# Patient Record
Sex: MALE | Race: WHITE | URBAN - METROPOLITAN AREA
[De-identification: names, ages, dates, MRNs, and addresses within clinical notes are randomized per-mention and may not be internally consistent; named-entity substitution may affect disease eponyms.]

---

## 2017-01-05 DIAGNOSIS — E78.5 HYPERLIPIDEMIA LDL GOAL <130: ICD-10-CM

## 2017-01-05 DIAGNOSIS — I10 HYPERTENSION WITH GOAL BLOOD PRESSURE LESS THAN 140/90: Primary | ICD-10-CM

## 2017-01-05 NOTE — TELEPHONE ENCOUNTER
Reason for Call:  Medication or medication refill:    Do you use a [facility]?  Name of the pharmacy and phone number for the current request:  [facility]    Name of the medication requested: simvastatin (ZOCOR) 40 MG, lisinopril (PRINIVIL,ZESTRIL) 10 MG     Other request: patient needs refill to be sent to mail order, pended.  Please call patient when done.    Can we leave a detailed message on this number? YES    Phone number patient can be reached at: Cell number on file:    Telephone Information:   Mobile [phone]       Best Time: any    Call taken on 1/5/2017 at 11:13 AM by Nina Gunn    simvastatin (ZOCOR) 40 MG     Last Written Prescription Date: 3-25-16  Last Fill Quantity: 90, # refills: 2  Last Office Visit with [facility], [facility] or [facility] prescribing provider: 3-24-16       CHOL      181   3/24/2016  HDL       46   3/24/2016  LDL      106   3/24/2016  TRIG      145   3/24/2016  CHOLHDLRATIO      3.6   3/19/2015    lisinopril (PRINIVIL,ZESTRIL) 10 MG       Last Written Prescription Date: 3-25-16  Last Fill Quantity: 90, # refills: 2  Last Office Visit with [facility], [facility] or [facility] prescribing provider: 3-24-16       POTASSIUM   Date Value Ref Range Status   03/24/2016 4.3 3.4 - 5.3 mmol/L Final     CREATININE   Date Value Ref Range Status   03/24/2016 0.94 0.66 - 1.25 mg/dL Final     BP Readings from Last 3 Encounters:   03/24/16 132/81   03/19/15 126/88   06/06/14 125/75

## 2017-01-06 PROBLEM — I10 HYPERTENSION WITH GOAL BLOOD PRESSURE LESS THAN 140/90: Status: ACTIVE | Noted: 2017-01-06

## 2017-01-06 RX ORDER — LISINOPRIL 10 MG/1
10 TABLET ORAL DAILY
Qty: 90 TABLET | Refills: 0 | Status: SHIPPED | OUTPATIENT
Start: 2017-01-06 | End: 2017-03-27

## 2017-01-06 RX ORDER — SIMVASTATIN 40 MG
40 TABLET ORAL AT BEDTIME
Qty: 90 TABLET | Refills: 0 | Status: SHIPPED | OUTPATIENT
Start: 2017-01-06 | End: 2017-03-27

## 2017-01-06 NOTE — TELEPHONE ENCOUNTER
Last seen 3/24/16, advised one year re-check.    BP Readings from Last 3 Encounters:   03/24/16 132/81   03/19/15 126/88   06/06/14 125/75     CREATININE   Date Value Ref Range Status   03/24/2016 0.94 0.66 - 1.25 mg/dL Final   ]  POTASSIUM   Date Value Ref Range Status   03/24/2016 4.3 3.4 - 5.3 mmol/L Final   ]  Recent Labs   Lab Test  03/24/16   0745  03/19/15   0736  04/03/14   0740   CHOL  181  176  163   HDL  46  49  42   LDL  106*  104  100   TRIG  145  116  111   CHOLHDLRATIO   --   3.6  3.9     Stated LDL is <130.    Prescription lisinopril can be approved by RN for 90 days per [facility] Refill Protocol.  Unsure if can be sent under alternate provider or needs to be routed (PCP is retired).  Clinic PCS checking on this.  Will hold refill for response.  Routed to covering provider for new signature.    Simvastatin: Will need to route refill request to alternate provider (see above) for review/approval because:  Drug interaction warning    Tova Edgar, ASHER  [facility]

## 2017-03-27 ENCOUNTER — OFFICE VISIT (OUTPATIENT)
Dept: FAMILY MEDICINE | Facility: CLINIC | Age: 62
End: 2017-03-27
Payer: COMMERCIAL

## 2017-03-27 VITALS
TEMPERATURE: 97.7 F | WEIGHT: 221 LBS | DIASTOLIC BLOOD PRESSURE: 82 MMHG | SYSTOLIC BLOOD PRESSURE: 138 MMHG | HEART RATE: 82 BPM | HEIGHT: 70 IN | BODY MASS INDEX: 31.64 KG/M2

## 2017-03-27 DIAGNOSIS — Z00.00 ROUTINE GENERAL MEDICAL EXAMINATION AT A HEALTH CARE FACILITY: Primary | ICD-10-CM

## 2017-03-27 DIAGNOSIS — I10 HYPERTENSION WITH GOAL BLOOD PRESSURE LESS THAN 140/90: ICD-10-CM

## 2017-03-27 DIAGNOSIS — B35.4 TINEA CORPORIS: ICD-10-CM

## 2017-03-27 DIAGNOSIS — Z80.42 FAMILY HISTORY OF PROSTATE CANCER IN FATHER: ICD-10-CM

## 2017-03-27 DIAGNOSIS — E78.5 HYPERLIPIDEMIA LDL GOAL <130: ICD-10-CM

## 2017-03-27 LAB
ALT SERPL W P-5'-P-CCNC: 38 U/L (ref 0–70)
ANION GAP SERPL CALCULATED.3IONS-SCNC: 8 MMOL/L (ref 3–14)
AST SERPL W P-5'-P-CCNC: 19 U/L (ref 0–45)
BUN SERPL-MCNC: 16 MG/DL (ref 7–30)
CALCIUM SERPL-MCNC: 9.1 MG/DL (ref 8.5–10.1)
CHLORIDE SERPL-SCNC: 105 MMOL/L (ref 94–109)
CHOLEST SERPL-MCNC: 223 MG/DL
CK SERPL-CCNC: 181 U/L (ref 30–300)
CO2 SERPL-SCNC: 31 MMOL/L (ref 20–32)
CREAT SERPL-MCNC: 1.02 MG/DL (ref 0.66–1.25)
GFR SERPL CREATININE-BSD FRML MDRD: 75 ML/MIN/1.7M2
GLUCOSE SERPL-MCNC: 105 MG/DL (ref 70–99)
HDLC SERPL-MCNC: 51 MG/DL
LDLC SERPL CALC-MCNC: 139 MG/DL
NONHDLC SERPL-MCNC: 172 MG/DL
POTASSIUM SERPL-SCNC: 4.4 MMOL/L (ref 3.4–5.3)
PSA SERPL-ACNC: 0.88 UG/L (ref 0–4)
SODIUM SERPL-SCNC: 144 MMOL/L (ref 133–144)
TRIGL SERPL-MCNC: 164 MG/DL

## 2017-03-27 PROCEDURE — 82550 ASSAY OF CK (CPK): CPT | Performed by: FAMILY MEDICINE

## 2017-03-27 PROCEDURE — G0103 PSA SCREENING: HCPCS | Performed by: FAMILY MEDICINE

## 2017-03-27 PROCEDURE — 80061 LIPID PANEL: CPT | Performed by: FAMILY MEDICINE

## 2017-03-27 PROCEDURE — 99396 PREV VISIT EST AGE 40-64: CPT | Performed by: FAMILY MEDICINE

## 2017-03-27 PROCEDURE — 84460 ALANINE AMINO (ALT) (SGPT): CPT | Performed by: FAMILY MEDICINE

## 2017-03-27 PROCEDURE — 84450 TRANSFERASE (AST) (SGOT): CPT | Performed by: FAMILY MEDICINE

## 2017-03-27 PROCEDURE — 36415 COLL VENOUS BLD VENIPUNCTURE: CPT | Performed by: FAMILY MEDICINE

## 2017-03-27 PROCEDURE — 80048 BASIC METABOLIC PNL TOTAL CA: CPT | Performed by: FAMILY MEDICINE

## 2017-03-27 RX ORDER — LISINOPRIL 10 MG/1
10 TABLET ORAL DAILY
Qty: 90 TABLET | Refills: 3 | Status: SHIPPED | OUTPATIENT
Start: 2017-03-27 | End: 2018-04-02

## 2017-03-27 RX ORDER — FLUCONAZOLE 200 MG/1
200 TABLET ORAL DAILY
Qty: 4 TABLET | Refills: 6 | Status: SHIPPED | OUTPATIENT
Start: 2017-03-27 | End: 2019-04-04

## 2017-03-27 RX ORDER — SIMVASTATIN 40 MG
40 TABLET ORAL AT BEDTIME
Qty: 90 TABLET | Refills: 3 | Status: SHIPPED | OUTPATIENT
Start: 2017-03-27 | End: 2018-04-02

## 2017-03-27 NOTE — PROGRESS NOTES
SUBJECTIVE:     CC: Mitchell Randolph is an 61 year old male who presents for preventative health visit.     Physical   Annual:     Getting at least 3 servings of Calcium per day::  Yes    Bi-annual eye exam::  Yes    Dental care twice a year::  Yes    Sleep apnea or symptoms of sleep apnea::  Excessive snoring    Diet::  Regular (no restrictions) and Low fat/cholesterol    Frequency of exercise::  1 day/week    Duration of exercise::  15-30 minutes    Taking medications regularly::  Yes    Medication side effects::  Not applicable    Additional concerns today::  No            Today's PHQ-2 Score:   PHQ-2 ( 1999 Pfizer) 3/26/2017   Little interest or pleasure in doing things Not at all   Feeling down, depressed or hopeless Not at all   PHQ-2 Score 0       Abuse: Current or Past(Physical, Sexual or Emotional)- No  Do you feel safe in your environment - Yes    Social History   Substance Use Topics     Smoking status: Never Smoker     Smokeless tobacco: Never Used     Alcohol use Yes      Comment: 2 beers per week     The patient does not drink >3 drinks per day nor >7 drinks per week.    Last PSA:   PSA   Date Value Ref Range Status   03/24/2016 0.87 0 - 4 ug/L Final       Recent Labs   Lab Test  03/24/16   0745  03/19/15   0736  04/03/14   0740   CHOL  181  176  163   HDL  46  49  42   LDL  106*  104  100   TRIG  145  116  111   CHOLHDLRATIO   --   3.6  3.9   NHDL  135*   --    --        Reviewed orders with patient. Reviewed health maintenance and updated orders accordingly - Yes    Reviewed and updated as needed this visit by clinical staff    Father recently diagnosed with aggressive prostate cancer          Reviewed and updated as needed this visit by Provider        Past Medical History:   Diagnosis Date     High cholesterol       History reviewed. No pertinent surgical history.    ROS:  C: NEGATIVE for fever, chills, change in weight  I: NEGATIVE for worrisome rashes, moles or lesions  E: NEGATIVE for vision changes  "or irritation; tinnitus   ENT: NEGATIVE for ear, mouth and throat problems  R: NEGATIVE for significant cough or SOB  CV: NEGATIVE for chest pain, palpitations or peripheral edema  GI: NEGATIVE for nausea, abdominal pain, heartburn, or change in bowel habits   male: negative for dysuria, hematuria, decreased urinary stream, erectile dysfunction, urethral discharge  M: NEGATIVE for significant arthralgias or myalgia  N: NEGATIVE for weakness, dizziness or paresthesias  P: NEGATIVE for changes in mood or affect    OBJECTIVE:     /82  Pulse 82  Temp 97.7  F (36.5  C) (Oral)  Ht 5' 9.69\" (1.77 m)  Wt 221 lb (100.2 kg)  BMI 32 kg/m2  EXAM:  GENERAL: healthy, alert and no distress; overweight   EYES: Eyes grossly normal to inspection, PERRL and conjunctivae and sclerae normal  HENT: ear canals and TM's normal, nose and mouth without ulcers or lesions  NECK: no adenopathy, no asymmetry, masses, or scars and thyroid normal to palpation  RESP: lungs clear to auscultation - no rales, rhonchi or wheezes  CV: regular rate and rhythm, normal S1 S2, no S3 or S4, no murmur, click or rub, no peripheral edema and peripheral pulses strong  ABDOMEN: soft, nontender, no hepatosplenomegaly, no masses and bowel sounds normal  MS: no gross musculoskeletal defects noted, no edema  SKIN: no suspicious lesions or rashes  NEURO: Normal strength and tone, mentation intact and speech normal  PSYCH: mentation appears normal, affect normal/bright    ASSESSMENT/PLAN:         ICD-10-CM    1. Routine general medical examination at a health care facility Z00.00    2. Hypertension with goal blood pressure less than 140/90 I10 lisinopril (PRINIVIL/ZESTRIL) 10 MG tablet     Basic metabolic panel   3. Hyperlipidemia LDL goal <130 E78.5 simvastatin (ZOCOR) 40 MG tablet     Lipid panel reflex to direct LDL     AST     ALT     CK total   4. Tinea corporis B35.4 fluconazole (DIFLUCAN) 200 MG tablet   5. Family history of prostate cancer in " "father Z80.42 PSA, screen       COUNSELING:   Reviewed preventive health counseling, as reflected in patient instructions       Regular exercise       Healthy diet/nutrition         reports that he has never smoked. He has never used smokeless tobacco.    Estimated body mass index is 31.73 kg/(m^2) as calculated from the following:    Height as of 3/24/16: 5' 9.5\" (1.765 m).    Weight as of 3/24/16: 218 lb (98.9 kg).   Weight management plan: Discussed healthy diet and exercise guidelines and patient will follow up in 12 months in clinic to re-evaluate.    Counseling Resources:  ATP IV Guidelines  Pooled Cohorts Equation Calculator  FRAX Risk Assessment  ICSI Preventive Guidelines  Dietary Guidelines for Americans, 2010  USDA's MyPlate  ASA Prophylaxis  Lung CA Screening    Todd Oh MD  [facility]  Answers for HPI/ROS submitted by the patient on 3/26/2017   Q1: Little interest or pleasure in doing things: 0=Not at all  Q2: Feeling down, depressed or hopeless: 0=Not at all  PHQ-2 Score: 0    "

## 2017-03-27 NOTE — NURSING NOTE
"Chief Complaint   Patient presents with     Physical       Initial BP (!) 144/93 (BP Location: Left arm, Cuff Size: Adult Regular)  Pulse 82  Temp 97.7  F (36.5  C) (Oral)  Ht 5' 9.69\" (1.77 m)  Wt 221 lb (100.2 kg)  BMI 32 kg/m2 Estimated body mass index is 32 kg/(m^2) as calculated from the following:    Height as of this encounter: 5' 9.69\" (1.77 m).    Weight as of this encounter: 221 lb (100.2 kg).  Medication Reconciliation: complete   Lidya Reese MA      "

## 2017-03-27 NOTE — MR AVS SNAPSHOT
After Visit Summary   3/27/2017    Mitchell Randolph    MRN: [id number]           Patient Information     Date Of Birth          1955        Visit Information        Provider Department      3/27/2017 7:40 AM Todd Oh MD [facility]        Today's Diagnoses     Family history of prostate cancer in father    -  1    Hypertension with goal blood pressure less than 140/90        Hyperlipidemia LDL goal <130        Tinea corporis          Care Instructions      Preventive Health Recommendations  Male Ages 50 - 64    Yearly exam:             See your health care provider every year in order to  o   Review health changes.   o   Discuss preventive care.    o   Review your medicines if your doctor has prescribed any.     Have a cholesterol test every 5 years, or more frequently if you are at risk for high cholesterol/heart disease.     Have a diabetes test (fasting glucose) every three years. If you are at risk for diabetes, you should have this test more often.     Have a colonoscopy at age 50, or have a yearly FIT test (stool test). These exams will check for colon cancer.      Talk with your health care provider about whether or not a prostate cancer screening test (PSA) is right for you.    You should be tested each year for STDs (sexually transmitted diseases), if you re at risk.     Shots: Get a flu shot each year. Get a tetanus shot every 10 years.     Nutrition:    Eat at least 5 servings of fruits and vegetables daily.     Eat whole-grain bread, whole-wheat pasta and brown rice instead of white grains and rice.     Talk to your provider about Calcium and Vitamin D.     Lifestyle    Exercise for at least 150 minutes a week (30 minutes a day, 5 days a week). This will help you control your weight and prevent disease.     Limit alcohol to one drink per day.     No smoking.     Wear sunscreen to prevent skin cancer.     See your dentist every six months for an exam and  "cleaning.     See your eye doctor every 1 to 2 years.          Follow-ups after your visit        Follow-up notes from your care team     Return in about 1 year (around 3/27/2018) for Physical Exam.      Who to contact     If you have questions or need follow up information about today's clinic visit or your schedule please contact [facility] directly at [phone].  Normal or non-critical lab and imaging results will be communicated to you by [employer], letter or phone within 4 business days after the clinic has received the results. If you do not hear from us within 7 days, please contact the clinic through [employer] or phone. If you have a critical or abnormal lab result, we will notify you by phone as soon as possible.  Submit refill requests through [employer] or call your pharmacy and they will forward the refill request to us. Please allow 3 business days for your refill to be completed.          Additional Information About Your Visit        [employer] Information     [employer] gives you secure access to your electronic health record. If you see a primary care provider, you can also send messages to your care team and make appointments. If you have questions, please call your primary care clinic.  If you do not have a primary care provider, please call [phone] and they will assist you.        Care EveryWhere ID     This is your Care EveryWhere ID. This could be used by other organizations to access your [place] medical records  [id number]        Your Vitals Were     Pulse Temperature Height BMI (Body Mass Index)          82 97.7  F (36.5  C) (Oral) 5' 9.69\" (1.77 m) 32 kg/m2         Blood Pressure from Last 3 Encounters:   03/27/17 138/82   03/24/16 132/81   03/19/15 126/88    Weight from Last 3 Encounters:   03/27/17 221 lb (100.2 kg)   03/24/16 218 lb (98.9 kg)   03/19/15 214 lb (97.1 kg)              We Performed the Following     ALT     AST     Basic metabolic panel     CK total     " Lipid panel reflex to direct LDL     PSA, screen          Where to get your medicines      These medications were sent to [facility] - [place], [place] - [address]  [address] [address]     Phone:  [phone]     lisinopril 10 MG tablet    simvastatin 40 MG tablet         These medications were sent to [id number] - [place], [place] - [address] [facility] [address]  [address], [facility] [address]     Phone:  [phone]     fluconazole 200 MG tablet          Primary Care Provider Office Phone # Fax #    Max Welch [phone]       [facility] [address] [facility] [address]        Thank you!     Thank you for choosing [facility]  for your care. Our goal is always to provide you with excellent care. Hearing back from our patients is one way we can continue to improve our services. Please take a few minutes to complete the written survey that you may receive in the mail after your visit with us. Thank you!             Your Updated Medication List - Protect others around you: Learn how to safely use, store and throw away your medicines at www.disposemymeds.org.          This list is accurate as of: 3/27/17  8:29 AM.  Always use your most recent med list.                   Brand Name Dispense Instructions for use    fluconazole 200 MG tablet    DIFLUCAN    4 tablet    Take 1 tablet (200 mg) by mouth daily 2 tablets as needed, repeat 2 tablets in 2 weeks       lisinopril 10 MG tablet    PRINIVIL/ZESTRIL    90 tablet    Take 1 tablet (10 mg) by mouth daily Due for recheck in March 2017       simvastatin 40 MG tablet    ZOCOR    90 tablet    Take 1 tablet (40 mg) by mouth At Bedtime Due for recheck in March 2017

## 2017-03-27 NOTE — PROGRESS NOTES
"  SUBJECTIVE:     CC: Mitchell Randolph is an 61 year old male who presents for preventative health visit.     Healthy Habits:    Do you get at least three servings of calcium containing foods daily (dairy, green leafy vegetables, etc.)? {YES/NO, DAIRY INTAKE:434614::\"yes\"}    Amount of exercise or daily activities, outside of work: {AMOUNT EXERCISE:264310}    Problems taking medications regularly {Yes /No default:130865::\"No\"}    Medication side effects: {Yes /No default.:965523::\"No\"}    Have you had an eye exam in the past two years? {YESNOBLANK:439526}    Do you see a dentist twice per year? {YESNOBLANK:835407}    Do you have sleep apnea, excessive snoring or daytime drowsiness?{YESNOBLANK:241935}    {Outside tests to abstract? :486580}    {additional problems to add:765821}    Today's PHQ-2 Score:   PHQ-2 ( 1999 Pfizer) 3/26/2017 3/24/2016   Q1: Little interest or pleasure in doing things - 0   Q2: Feeling down, depressed or hopeless - 0   PHQ-2 Score - 0   Little interest or pleasure in doing things Not at all -   Feeling down, depressed or hopeless Not at all -   PHQ-2 Score 0 -     {PHQ-2 LOOK IN ASSESSMENTS :837678}  Abuse: Current or Past(Physical, Sexual or Emotional)- {YES/NO/NA:445133}  Do you feel safe in your environment - {YES/NO/NA:355704}    Social History   Substance Use Topics     Smoking status: Never Smoker     Smokeless tobacco: Never Used     Alcohol use Yes      Comment: 2 beers per week     {ETOH AUDIT:448898}    Last PSA:   PSA   Date Value Ref Range Status   03/24/2016 0.87 0 - 4 ug/L Final       Recent Labs   Lab Test  03/24/16   0745  03/19/15   0736  04/03/14   0740   CHOL  181  176  163   HDL  46  49  42   LDL  106*  104  100   TRIG  145  116  111   CHOLHDLRATIO   --   3.6  3.9   NHDL  135*   --    --        Reviewed orders with patient. Reviewed health maintenance and updated orders accordingly - {Yes/No:883754::\"Yes\"}    Reviewed and updated as needed this visit by clinical staff     " "    Reviewed and updated as needed this visit by Provider        {HISTORY OPTIONS:048686}    ROS:  {MALE ROS-adult preventive care package:165303::\"C: NEGATIVE for fever, chills, change in weight\",\"I: NEGATIVE for worrisome rashes, moles or lesions\",\"E: NEGATIVE for vision changes or irritation\",\"ENT: NEGATIVE for ear, mouth and throat problems\",\"R: NEGATIVE for significant cough or SOB\",\"CV: NEGATIVE for chest pain, palpitations or peripheral edema\",\"GI: NEGATIVE for nausea, abdominal pain, heartburn, or change in bowel habits\",\" male: negative for dysuria, hematuria, decreased urinary stream, erectile dysfunction, urethral discharge\",\"M: NEGATIVE for significant arthralgias or myalgia\",\"N: NEGATIVE for weakness, dizziness or paresthesias\",\"P: NEGATIVE for changes in mood or affect\"}    {CHRONICPROBDATA:235197}  OBJECTIVE:     There were no vitals taken for this visit.  EXAM:  {Exam Choices:218102}    ASSESSMENT/PLAN:     {Diag Picklist:417267}    COUNSELING:  {MALE COUNSELING MESSAGES:121380::\"Reviewed preventive health counseling, as reflected in patient instructions\"}    {Blood Pressure - Adult Preventive:801526}     reports that he has never smoked. He has never used smokeless tobacco.  {Tobacco Cessation needed for ACO -- Delete if patient is a non-smoker:690828}  Estimated body mass index is 31.73 kg/(m^2) as calculated from the following:    Height as of 3/24/16: 5' 9.5\" (1.765 m).    Weight as of 3/24/16: 218 lb (98.9 kg).   {Weight Management Plan needed for ACO:229657}    Counseling Resources:  ATP IV Guidelines  Pooled Cohorts Equation Calculator  FRAX Risk Assessment  ICSI Preventive Guidelines  Dietary Guidelines for Americans, 2010  USDA's MyPlate  ASA Prophylaxis  Lung CA Screening    Todd Oh MD  [facility]  "

## 2017-03-31 NOTE — PROGRESS NOTES
Your psa is normal   Your basic metabolic panel which includes electrolytes,kidney function is normal and  -Glucose (diabetic screening test) is mildly elevated.   Your cholesterols are moderately elevated and have gone up from a year ago.   Your liver tests and muscle enzyme tests are normal     I would guess from your numbers you had stopped taking your choleserols meds   If so, restart them.  If not, please call office to discuss.           Follow up in 6 month(s) for repeat lipids

## 2017-07-13 ENCOUNTER — MYC MEDICAL ADVICE (OUTPATIENT)
Dept: FAMILY MEDICINE | Facility: CLINIC | Age: 62
End: 2017-07-13

## 2018-04-02 ENCOUNTER — OFFICE VISIT (OUTPATIENT)
Dept: FAMILY MEDICINE | Facility: CLINIC | Age: 63
End: 2018-04-02
Payer: COMMERCIAL

## 2018-04-02 VITALS
OXYGEN SATURATION: 98 % | SYSTOLIC BLOOD PRESSURE: 138 MMHG | HEIGHT: 70 IN | DIASTOLIC BLOOD PRESSURE: 80 MMHG | BODY MASS INDEX: 32.35 KG/M2 | TEMPERATURE: 98.4 F | WEIGHT: 226 LBS | HEART RATE: 64 BPM

## 2018-04-02 DIAGNOSIS — I10 HYPERTENSION WITH GOAL BLOOD PRESSURE LESS THAN 140/90: ICD-10-CM

## 2018-04-02 DIAGNOSIS — E66.09 CLASS 1 OBESITY DUE TO EXCESS CALORIES WITHOUT SERIOUS COMORBIDITY IN ADULT, UNSPECIFIED BMI: ICD-10-CM

## 2018-04-02 DIAGNOSIS — E66.811 CLASS 1 OBESITY DUE TO EXCESS CALORIES WITHOUT SERIOUS COMORBIDITY IN ADULT, UNSPECIFIED BMI: ICD-10-CM

## 2018-04-02 DIAGNOSIS — Z00.01 ENCOUNTER FOR GENERAL ADULT MEDICAL EXAMINATION WITH ABNORMAL FINDINGS: Primary | ICD-10-CM

## 2018-04-02 DIAGNOSIS — Z80.42 FAMILY HISTORY OF PROSTATE CANCER: ICD-10-CM

## 2018-04-02 DIAGNOSIS — E78.5 HYPERLIPIDEMIA LDL GOAL <130: ICD-10-CM

## 2018-04-02 DIAGNOSIS — Z12.11 SPECIAL SCREENING FOR MALIGNANT NEOPLASMS, COLON: ICD-10-CM

## 2018-04-02 LAB
ALT SERPL W P-5'-P-CCNC: 37 U/L (ref 0–70)
ANION GAP SERPL CALCULATED.3IONS-SCNC: 4 MMOL/L (ref 3–14)
AST SERPL W P-5'-P-CCNC: 20 U/L (ref 0–45)
BUN SERPL-MCNC: 20 MG/DL (ref 7–30)
CALCIUM SERPL-MCNC: 8.8 MG/DL (ref 8.5–10.1)
CHLORIDE SERPL-SCNC: 106 MMOL/L (ref 94–109)
CHOLEST SERPL-MCNC: 179 MG/DL
CK SERPL-CCNC: 231 U/L (ref 30–300)
CO2 SERPL-SCNC: 31 MMOL/L (ref 20–32)
CREAT SERPL-MCNC: 1.01 MG/DL (ref 0.66–1.25)
GFR SERPL CREATININE-BSD FRML MDRD: 75 ML/MIN/1.7M2
GLUCOSE SERPL-MCNC: 104 MG/DL (ref 70–99)
HDLC SERPL-MCNC: 49 MG/DL
LDLC SERPL CALC-MCNC: 104 MG/DL
NONHDLC SERPL-MCNC: 130 MG/DL
POTASSIUM SERPL-SCNC: 4.3 MMOL/L (ref 3.4–5.3)
PSA SERPL-ACNC: 1 UG/L (ref 0–4)
SODIUM SERPL-SCNC: 141 MMOL/L (ref 133–144)
TRIGL SERPL-MCNC: 128 MG/DL

## 2018-04-02 PROCEDURE — 84153 ASSAY OF PSA TOTAL: CPT | Performed by: FAMILY MEDICINE

## 2018-04-02 PROCEDURE — 84450 TRANSFERASE (AST) (SGOT): CPT | Performed by: FAMILY MEDICINE

## 2018-04-02 PROCEDURE — 99396 PREV VISIT EST AGE 40-64: CPT | Performed by: FAMILY MEDICINE

## 2018-04-02 PROCEDURE — 36415 COLL VENOUS BLD VENIPUNCTURE: CPT | Performed by: FAMILY MEDICINE

## 2018-04-02 PROCEDURE — 80048 BASIC METABOLIC PNL TOTAL CA: CPT | Performed by: FAMILY MEDICINE

## 2018-04-02 PROCEDURE — 80061 LIPID PANEL: CPT | Performed by: FAMILY MEDICINE

## 2018-04-02 PROCEDURE — 82550 ASSAY OF CK (CPK): CPT | Performed by: FAMILY MEDICINE

## 2018-04-02 PROCEDURE — 84460 ALANINE AMINO (ALT) (SGPT): CPT | Performed by: FAMILY MEDICINE

## 2018-04-02 RX ORDER — LISINOPRIL 20 MG/1
20 TABLET ORAL DAILY
Qty: 90 TABLET | Refills: 3 | Status: SHIPPED | OUTPATIENT
Start: 2018-04-02 | End: 2019-01-15

## 2018-04-02 RX ORDER — SIMVASTATIN 40 MG
40 TABLET ORAL AT BEDTIME
Qty: 90 TABLET | Refills: 3 | Status: SHIPPED | OUTPATIENT
Start: 2018-04-02 | End: 2019-01-15

## 2018-04-02 NOTE — PROGRESS NOTES
SUBJECTIVE:   CC: Mitchell Randolph is an 62 year old male who presents for preventative health visit.     Physical   Annual:     Getting at least 3 servings of Calcium per day::  Yes    Bi-annual eye exam::  Yes    Dental care twice a year::  Yes    Sleep apnea or symptoms of sleep apnea::  Daytime drowsiness    Diet::  Low fat/cholesterol    Frequency of exercise::  1 day/week    Duration of exercise::  15-30 minutes    Taking medications regularly::  Yes    Medication side effects::  None    Additional concerns today::  No              Today's PHQ-2 Score:   PHQ-2 ( 1999 Pfizer) 4/2/2018   Q1: Little interest or pleasure in doing things 0   Q2: Feeling down, depressed or hopeless 0   PHQ-2 Score 0   Q1: Little interest or pleasure in doing things Not at all   Q2: Feeling down, depressed or hopeless Not at all   PHQ-2 Score 0       Abuse: Current or Past(Physical, Sexual or Emotional)- No  Do you feel safe in your environment - Yes    Social History   Substance Use Topics     Smoking status: Never Smoker     Smokeless tobacco: Never Used     Alcohol use Yes      Comment: 2 beers per week     Alcohol Use 4/2/2018   If you drink alcohol do you typically have greater than 3 drinks per day OR greater than 7 drinks per week? No       Last PSA:   PSA   Date Value Ref Range Status   03/27/2017 0.88 0 - 4 ug/L Final     Comment:     Assay Method:  Chemiluminescence using Siemens Vista analyzer       Reviewed orders with patient. Reviewed health maintenance and updated orders accordingly - Yes      Reviewed and updated as needed this visit by clinical staff  Tobacco  Allergies  Meds  Med Hx  Surg Hx  Fam Hx  Soc Hx        Reviewed and updated as needed this visit by Provider        Past Medical History:   Diagnosis Date     High cholesterol       History reviewed. No pertinent surgical history.    Review of Systems  C: NEGATIVE for fever, chills, change in weight  I: NEGATIVE for worrisome rashes, moles or lesions  E:  "NEGATIVE for vision changes or irritation  ENT: NEGATIVE for ear, mouth and throat problems  R: NEGATIVE for significant cough or SOB  CV: NEGATIVE for chest pain, palpitations or peripheral edema; snores   No apnea   GI: NEGATIVE for nausea, abdominal pain, heartburn, or change in bowel habits   male: negative for dysuria, hematuria, decreased urinary stream, erectile dysfunction, urethral discharge  M: NEGATIVE for significant arthralgias or myalgia  N: NEGATIVE for weakness, dizziness or paresthesias  P: NEGATIVE for changes in mood or affect    OBJECTIVE:   /83 (BP Location: Left arm, Patient Position: Chair, Cuff Size: Adult Large)  Pulse 64  Temp 98.4  F (36.9  C) (Oral)  Ht 5' 9.69\" (1.77 m)  Wt 226 lb (102.5 kg)  SpO2 98%  BMI 32.72 kg/m2     Wt Readings from Last 4 Encounters:   04/02/18 226 lb (102.5 kg)   03/27/17 221 lb (100.2 kg)   03/24/16 218 lb (98.9 kg)   03/19/15 214 lb (97.1 kg)          Physical Exam  GENERAL: healthy, alert and no distress  EYES: Eyes grossly normal to inspection, PERRL and conjunctivae and sclerae normal  HENT: ear canals and TM's normal, nose and mouth without ulcers or lesions  NECK: no adenopathy, no asymmetry, masses, or scars and thyroid normal to palpation  RESP: lungs clear to auscultation - no rales, rhonchi or wheezes  CV: regular rate and rhythm, normal S1 S2, no S3 or S4, no murmur, click or rub, no peripheral edema and peripheral pulses strong  ABDOMEN: soft, nontender, no hepatosplenomegaly, no masses and bowel sounds normal  MS: no gross musculoskeletal defects noted, no edema  SKIN: no suspicious lesions or rashes  NEURO: Normal strength and tone, mentation intact and speech normal  PSYCH: mentation appears normal, affect normal/bright    ASSESSMENT/PLAN:   No diagnosis found.    COUNSELING:   Reviewed preventive health counseling, as reflected in patient instructions       Regular exercise       Healthy diet/nutrition         reports that he has " "never smoked. He has never used smokeless tobacco.    Estimated body mass index is 32.72 kg/(m^2) as calculated from the following:    Height as of this encounter: 5' 9.69\" (1.77 m).    Weight as of this encounter: 226 lb (102.5 kg).   Weight management plan: Discussed healthy diet and exercise guidelines and patient will follow up in 12 months in clinic to re-evaluate.    Counseling Resources:  ATP IV Guidelines  Pooled Cohorts Equation Calculator  FRAX Risk Assessment  ICSI Preventive Guidelines  Dietary Guidelines for Americans, 2010  USDA's MyPlate  ASA Prophylaxis  Lung CA Screening    Todd Oh MD  [facility]  Answers for HPI/ROS submitted by the patient on 4/2/2018   PHQ-2 Score: 0    "

## 2018-04-02 NOTE — MR AVS SNAPSHOT
After Visit Summary   4/2/2018    Mitchell Randolph    MRN: [id number]           Patient Information     Date Of Birth          1955        Visit Information        Provider Department      4/2/2018 7:40 AM Todd Oh MD [facility]        Today's Diagnoses     Encounter for general adult medical examination with abnormal findings    -  1    Class 1 obesity due to excess calories without serious comorbidity in adult, unspecified BMI        Hypertension with goal blood pressure less than 140/90        Hyperlipidemia LDL goal <130        Family history of prostate cancer        Special screening for malignant neoplasms, colon          Care Instructions      Preventive Health Recommendations  Male Ages 50 - 64    Yearly exam:             See your health care provider every year in order to  o   Review health changes.   o   Discuss preventive care.    o   Review your medicines if your doctor has prescribed any.     Have a cholesterol test every 5 years, or more frequently if you are at risk for high cholesterol/heart disease.     Have a diabetes test (fasting glucose) every three years. If you are at risk for diabetes, you should have this test more often.     Have a colonoscopy at age 50, or have a yearly FIT test (stool test). These exams will check for colon cancer.      Talk with your health care provider about whether or not a prostate cancer screening test (PSA) is right for you.    You should be tested each year for STDs (sexually transmitted diseases), if you re at risk.     Shots: Get a flu shot each year. Get a tetanus shot every 10 years.     Nutrition:    Eat at least 5 servings of fruits and vegetables daily.     Eat whole-grain bread, whole-wheat pasta and brown rice instead of white grains and rice.     Talk to your provider about Calcium and Vitamin D.     Lifestyle    Exercise for at least 150 minutes a week (30 minutes a day, 5 days a week). This will help  you control your weight and prevent disease.     Limit alcohol to one drink per day.     No smoking.     Wear sunscreen to prevent skin cancer.     See your dentist every six months for an exam and cleaning.     See your eye doctor every 1 to 2 years.            Follow-ups after your visit        Additional Services     GASTROENTEROLOGY ADULT REF CONSULT ONLY       Preferred Location: [facility] [phone]      Please be aware that coverage of these services is subject to the terms and limitations of your health insurance plan.  Call member services at your health plan with any benefit or coverage questions.  Any procedures must be performed at a [facility] facility OR coordinated by your clinic's referral office.    Please bring the following with you to your appointment:    (1) Any X-Rays, CTs or MRIs which have been performed.  Contact the facility where they were done to arrange for  prior to your scheduled appointment.    (2) List of current medications   (3) This referral request   (4) Any documents/labs given to you for this referral                  Who to contact     If you have questions or need follow up information about today's clinic visit or your schedule please contact [facility] directly at [phone].  Normal or non-critical lab and imaging results will be communicated to you by MyChart, letter or phone within 4 business days after the clinic has received the results. If you do not hear from us within 7 days, please contact the clinic through [email or web address] or phone. If you have a critical or abnormal lab result, we will notify you by phone as soon as possible.  Submit refill requests through [employer] or call your pharmacy and they will forward the refill request to us. Please allow 3 business days for your refill to be completed.          Additional Information About Your Visit        [email or web address] Information     [employer] gives you secure access to your electronic health record. If  "you see a primary care provider, you can also send messages to your care team and make appointments. If you have questions, please call your primary care clinic.  If you do not have a primary care provider, please call [phone] and they will assist you.        Care EveryWhere ID     This is your Care EveryWhere ID. This could be used by other organizations to access your [facility] medical records  [id number]        Your Vitals Were     Pulse Temperature Height Pulse Oximetry BMI (Body Mass Index)       64 98.4  F (36.9  C) (Oral) 5' 9.69\" (1.77 m) 98% 32.72 kg/m2        Blood Pressure from Last 3 Encounters:   04/02/18 138/80   03/27/17 138/82   03/24/16 132/81    Weight from Last 3 Encounters:   04/02/18 226 lb (102.5 kg)   03/27/17 221 lb (100.2 kg)   03/24/16 218 lb (98.9 kg)              We Performed the Following     ALT     AST     Basic metabolic panel     CK total     GASTROENTEROLOGY ADULT REF CONSULT ONLY     Lipid panel reflex to direct LDL Fasting     PSA, screen          Today's Medication Changes          These changes are accurate as of 4/2/18  8:29 AM.  If you have any questions, ask your nurse or doctor.               These medicines have changed or have updated prescriptions.        Dose/Directions    lisinopril 20 MG tablet   Commonly known as:  PRINIVIL/ZESTRIL   This may have changed:    - medication strength  - how much to take  - additional instructions   Used for:  Hypertension with goal blood pressure less than 140/90   Changed by:  Todd Oh MD        Dose:  20 mg   Take 1 tablet (20 mg) by mouth daily   Quantity:  90 tablet   Refills:  3            Where to get your medicines      These medications were sent to [facility] - [facility], [place] - [facility]  [address] [facility] [address]     Phone:  [phone]     lisinopril 20 MG tablet    simvastatin 40 MG tablet                Primary Care Provider Office Phone # Fax #    Max ASTORGA " MD Rebekah [phone]       [address] [facility] [address]        Equal Access to Services     MICAELA LOVE : Brett Brothers, danie medley, margotabel lopezjujujarred mckeonshonnajarred, mayank shanellin hayaanoelle mckeonlarisa mcghee laSuzanneaj osuna. So [facility] [phone].    ATENCIÓN: Si habla español, tiene a orosco disposición servicios gratuitos de asistencia lingüística. Llame al [phone].    We comply with applicable federal civil rights laws and [place] laws. We do not discriminate on the basis of race, color, national origin, age, disability, sex, sexual orientation, or gender identity.            Thank you!     Thank you for choosing [facility]  for your care. Our goal is always to provide you with excellent care. Hearing back from our patients is one way we can continue to improve our services. Please take a few minutes to complete the written survey that you may receive in the mail after your visit with us. Thank you!             Your Updated Medication List - Protect others around you: Learn how to safely use, store and throw away your medicines at www.disposemymeds.org.          This list is accurate as of 4/2/18  8:29 AM.  Always use your most recent med list.                   Brand Name Dispense Instructions for use Diagnosis    fluconazole 200 MG tablet    DIFLUCAN    4 tablet    Take 1 tablet (200 mg) by mouth daily 2 tablets as needed, repeat 2 tablets in 2 weeks    Tinea corporis       lisinopril 20 MG tablet    PRINIVIL/ZESTRIL    90 tablet    Take 1 tablet (20 mg) by mouth daily    Hypertension with goal blood pressure less than 140/90       simvastatin 40 MG tablet    ZOCOR    90 tablet    Take 1 tablet (40 mg) by mouth At Bedtime Due for recheck in March 2017    Hyperlipidemia LDL goal <130

## 2018-04-06 NOTE — PROGRESS NOTES
Your cholesterols are much improved from last year.  Muscle enzyme and liver function tests are normal  PSA which looks of prostate cancer is normal  Basic metabolic profile for electrolytes kidneys are normal mild elevation of your glucose.    Recheck 1 year.

## 2018-04-08 ENCOUNTER — OFFICE VISIT (OUTPATIENT)
Dept: URGENT CARE | Facility: URGENT CARE | Age: 63
End: 2018-04-08
Payer: COMMERCIAL

## 2018-04-08 VITALS
DIASTOLIC BLOOD PRESSURE: 88 MMHG | SYSTOLIC BLOOD PRESSURE: 140 MMHG | BODY MASS INDEX: 32.52 KG/M2 | OXYGEN SATURATION: 95 % | HEART RATE: 87 BPM | WEIGHT: 224.6 LBS | TEMPERATURE: 98.3 F

## 2018-04-08 DIAGNOSIS — J30.2 ACUTE SEASONAL ALLERGIC RHINITIS DUE TO OTHER ALLERGEN: Primary | ICD-10-CM

## 2018-04-08 PROCEDURE — 99213 OFFICE O/P EST LOW 20 MIN: CPT | Performed by: FAMILY MEDICINE

## 2018-04-08 RX ORDER — CETIRIZINE HYDROCHLORIDE 10 MG/1
10 TABLET ORAL EVERY EVENING
Qty: 30 TABLET | Refills: 1 | Status: SHIPPED | OUTPATIENT
Start: 2018-04-08 | End: 2019-03-11

## 2018-04-08 RX ORDER — FLUTICASONE PROPIONATE 50 MCG
1-2 SPRAY, SUSPENSION (ML) NASAL DAILY
Qty: 16 G | Refills: 0 | Status: SHIPPED | OUTPATIENT
Start: 2018-04-08 | End: 2019-03-11

## 2018-04-08 NOTE — PATIENT INSTRUCTIONS
Causes of Nasal Allergies  Nasal allergies are most commonly caused by one or more of 4 kinds of allergens: pollen (which causes seasonal allergies), house-dust mites, mold, and animals. Other substances, called irritants, can bother the nose and make allergy symptoms worse.    Pollen  Plants reproduce by moving tiny grains of pollen from plant to plant. Some pollen is carried by bees, and some is blown by the wind. It s the wind-blown pollen that causes nasal allergies. The amount of pollen in the air varies from season to season.  House-dust mites  House-dust mites are tiny bugs too small to see. They can live in mattresses, blankets, stuffed toys, carpets, and curtains. The droppings of these mites are a common indoor cause of nasal allergies.  Mold  Mold loves dark, damp areas. It tends to grow in bathrooms, basements, refrigerators, and in the soil of houseplants. Mold reproduces by sending tiny grains called spores into the air. If these spores are breathed in, they can cause a nasal allergic reaction.  Animals  Pets, such as cats, dogs, birds, horses, and rabbits, are common causes of nasal allergies. Flakes of skin (dander), saliva left on fur when an animal cleans itself, urine in litter boxes and cages, and feathers can all cause nasal allergies.  Irritants make allergies worse  Although irritants don t cause nasal allergies, they can make allergy symptoms worse. Cigarette smoke, perfume, aerosol sprays, smoke from wood stoves or fireplaces, car exhaust, and strong odors are examples of irritants.   Date Last Reviewed: 9/1/2016 2000-2017 [employer]. [address] [place] [id number]. All rights reserved. This information is not intended as a substitute for professional medical care. Always follow your healthcare professional's instructions.        Understanding Nasal Allergies  Nasal allergies (also called allergic rhinitis) are a common health problem. They may be seasonal.  This means they cause symptoms only at certain times of the year. Or they may be perennial. This means they cause symptoms all year long. Other health problems, such as asthma, often occur along with allergies as well.    What is an allergic reaction?  An allergy is a reaction to a substance called an allergen. Common allergens include:    Wind-borne pollen    Mold    Dust mites    Furry and feathered animals    Cockroaches  Normally, allergens are harmless. But when a person has allergies, the body thinks they are harmful. The body then attacks allergens with antibodies. Antibodies are attached to special cells called mast cells. Allergens stick to the antibodies. This makes the mast cells release histamine and other chemicals. This is an allergic reaction. The chemicals irritate nearby nasal tissue. This causes nasal allergy symptoms.  Common nasal allergy symptoms  Allergies can cause nasal tissue to swell. This makes the air passages smaller. The nose may feel stuffed up. The nose may also make extra mucus, which can plug the nasal passages or drip out of the nose. Mucus can drip down the back of the throat (postnasal drip) as well. Sinus tissue can swell. This may cause pain and headache. Common allergy symptoms include:    Runny nose with clear, watery discharge    Stuffy nose (nasal congestion)    Drainage down your throat (postnasal drip)    Sneezing    Red, watery eyes    Itchy nose, eyes, ears, and throat    Plugged-up ears (ear congestion)    Sore throat    Coughing    Sinus pain and swelling    Headache  It may not be allergies  Other health problems can cause symptoms like those of nasal allergies. These include:    Nonallergic rhinitis and viruses such as colds    Irritants and pollutants, such as strong odors or smoke    Certain medicines    Changes in the weather   Treatment  Your healthcare provider will evaluate you to find the cause of your symptoms then recommend treatment. If your symptoms are  due to nasal allergies, your healthcare provider may prescribe nasal steroid sprays or oral antihistamines to help reduce symptoms. Avoidance of the allergen will also be suggested. You may also be referred to an allergist.   Date Last Reviewed: 10/1/2016    5923-3099 [employer]. [address] [place] [address]. All rights reserved. This information is not intended as a substitute for professional medical care. Always follow your healthcare professional's instructions.

## 2018-04-08 NOTE — PROGRESS NOTES
SUBJECTIVE:  Chief Complaint   Patient presents with     URI     Patient complains of cold symptoms     Mitchell Randolph is a 62 year old male here with concerns about nasal congestion and pressure, stuffy sensation in the head.  He states onset of symptoms were 5 day(s) ago.  He has  Had ear plugging, has felt warm,  No sore throat, no sinus pain.  Course of illness is same. Severity moderate  Current and Associated symptoms: nasal congestion and headache, some chest tightness  Predisposing factors include seasonal allergies.   Recent treatment has included: None       Past Medical History:   Diagnosis Date     High cholesterol      Patient Active Problem List   Diagnosis     Slow urinary stream     HYPERLIPIDEMIA LDL GOAL <130     Tinea corporis     Advanced directives, counseling/discussion     Hypertension with goal blood pressure less than 140/90     Family history of prostate cancer in father         Current Outpatient Prescriptions on File Prior to Visit:  lisinopril (PRINIVIL/ZESTRIL) 20 MG tablet Take 1 tablet (20 mg) by mouth daily   simvastatin (ZOCOR) 40 MG tablet Take 1 tablet (40 mg) by mouth At Bedtime Due for recheck in March 2017   fluconazole (DIFLUCAN) 200 MG tablet Take 1 tablet (200 mg) by mouth daily 2 tablets as needed, repeat 2 tablets in 2 weeks (Patient not taking: Reported on 4/8/2018)     No current facility-administered medications on file prior to visit.     ALLERGIES:  Review of patient's allergies indicates no known allergies.    Social History   Substance Use Topics     Smoking status: Never Smoker     Smokeless tobacco: Never Used     Alcohol use Yes      Comment: 2 beers per week       Family History   Problem Relation Age of Onset     CANCER Maternal Grandfather      bone         ROS:  INTEGUMENTARY/SKIN: NEGATIVE for worrisome rashes,   EYES: NEGATIVE for vision changes or irritation  ENT/MOUTH: NEGATIVE for  mouth and throat problems  RESP:NEGATIVE for significant cough or SOB  GI:  NEGATIVE for nausea, abdominal pain,   or change in bowel habits    OBJECTIVE:  /88  Pulse 87  Temp 98.3  F (36.8  C) (Oral)  Wt 224 lb 9.6 oz (101.9 kg)  SpO2 95%  BMI 32.52 kg/m2  Exam:GENERAL APPEARANCE: healthy, alert and no distress  EYES: EOMI,  PERRL, conjunctiva clear  HENT: ear canals and TM's normal.  Nose and mouth without ulcers, erythema or lesions  NECK: supple, nontender, no lymphadenopathy  RESP: lungs clear to auscultation - no rales, rhonchi or wheezes  CV: regular rates and rhythm, normal S1 S2, no murmur noted  NEURO: Normal strength and tone, sensory exam grossly normal,  normal speech and mentation  SKIN: no suspicious lesions or rashes    ASSESSMENT:  Acute seasonal allergic rhinitis due to other allergen     - fluticasone (FLONASE) 50 MCG/ACT spray; Spray 1-2 sprays into both nostrils daily  - cetirizine (ZYRTEC) 10 MG tablet; Take 1 tablet (10 mg) by mouth every evening    Allergy medications and steroid nasal spray help reduce swelling within the nasal tissue and may help open drainage/   ventilation passages to the sinuses.         Follow up with primary clinic if not improving

## 2018-04-08 NOTE — MR AVS SNAPSHOT
After Visit Summary   4/8/2018    Mitchell Randolph    MRN: [id number]           Patient Information     Date Of Birth          1955        Visit Information        Provider Department      4/8/2018 9:20 AM Nazia Youngblood MD [facility]        Today's Diagnoses     Acute seasonal allergic rhinitis due to other allergen    -  1      Care Instructions      Causes of Nasal Allergies  Nasal allergies are most commonly caused by one or more of 4 kinds of allergens: pollen (which causes seasonal allergies), house-dust mites, mold, and animals. Other substances, called irritants, can bother the nose and make allergy symptoms worse.    Pollen  Plants reproduce by moving tiny grains of pollen from plant to plant. Some pollen is carried by bees, and some is blown by the wind. It s the wind-blown pollen that causes nasal allergies. The amount of pollen in the air varies from season to season.  House-dust mites  House-dust mites are tiny bugs too small to see. They can live in mattresses, blankets, stuffed toys, carpets, and curtains. The droppings of these mites are a common indoor cause of nasal allergies.  Mold  Mold loves dark, damp areas. It tends to grow in bathrooms, basements, refrigerators, and in the soil of houseplants. Mold reproduces by sending tiny grains called spores into the air. If these spores are breathed in, they can cause a nasal allergic reaction.  Animals  Pets, such as cats, dogs, birds, horses, and rabbits, are common causes of nasal allergies. Flakes of skin (dander), saliva left on fur when an animal cleans itself, urine in litter boxes and cages, and feathers can all cause nasal allergies.  Irritants make allergies worse  Although irritants don t cause nasal allergies, they can make allergy symptoms worse. Cigarette smoke, perfume, aerosol sprays, smoke from wood stoves or fireplaces, car exhaust, and strong odors are examples of irritants.   Date Last Reviewed:  9/1/2016 2000-2017 [employer]. [address] [place] [address], [place] [address]. All rights reserved. This information is not intended as a substitute for professional medical care. Always follow your healthcare professional's instructions.        Understanding Nasal Allergies  Nasal allergies (also called allergic rhinitis) are a common health problem. They may be seasonal. This means they cause symptoms only at certain times of the year. Or they may be perennial. This means they cause symptoms all year long. Other health problems, such as asthma, often occur along with allergies as well.    What is an allergic reaction?  An allergy is a reaction to a substance called an allergen. Common allergens include:    Wind-borne pollen    Mold    Dust mites    Furry and feathered animals    Cockroaches  Normally, allergens are harmless. But when a person has allergies, the body thinks they are harmful. The body then attacks allergens with antibodies. Antibodies are attached to special cells called mast cells. Allergens stick to the antibodies. This makes the mast cells release histamine and other chemicals. This is an allergic reaction. The chemicals irritate nearby nasal tissue. This causes nasal allergy symptoms.  Common nasal allergy symptoms  Allergies can cause nasal tissue to swell. This makes the air passages smaller. The nose may feel stuffed up. The nose may also make extra mucus, which can plug the nasal passages or drip out of the nose. Mucus can drip down the back of the throat (postnasal drip) as well. Sinus tissue can swell. This may cause pain and headache. Common allergy symptoms include:    Runny nose with clear, watery discharge    Stuffy nose (nasal congestion)    Drainage down your throat (postnasal drip)    Sneezing    Red, watery eyes    Itchy nose, eyes, ears, and throat    Plugged-up ears (ear congestion)    Sore throat    Coughing    Sinus pain and swelling    Headache  It may not be  allergies  Other health problems can cause symptoms like those of nasal allergies. These include:    Nonallergic rhinitis and viruses such as colds    Irritants and pollutants, such as strong odors or smoke    Certain medicines    Changes in the weather   Treatment  Your healthcare provider will evaluate you to find the cause of your symptoms then recommend treatment. If your symptoms are due to nasal allergies, your healthcare provider may prescribe nasal steroid sprays or oral antihistamines to help reduce symptoms. Avoidance of the allergen will also be suggested. You may also be referred to an allergist.   Date Last Reviewed: 10/1/2016    9402-3761 [employer]. [address] [place] [address]. All rights reserved. This information is not intended as a substitute for professional medical care. Always follow your healthcare professional's instructions.                Follow-ups after your visit        Who to contact     If you have questions or need follow up information about today's clinic visit or your schedule please contact [facility] directly at [phone].  Normal or non-critical lab and imaging results will be communicated to you by [employer], letter or phone within 4 business days after the clinic has received the results. If you do not hear from us within 7 days, please contact the clinic through [employer] or phone. If you have a critical or abnormal lab result, we will notify you by phone as soon as possible.  Submit refill requests through [facility] or call your pharmacy and they will forward the refill request to us. Please allow 3 business days for your refill to be completed.          Additional Information About Your Visit        [facility] Information     [facility] gives you secure access to your electronic health record. If you see a primary care provider, you can also send messages to your care team and make appointments. If you have questions, please call your  primary care clinic.  If you do not have a primary care provider, please call [phone] and they will assist you.        Care EveryWhere ID     This is your Care EveryWhere ID. This could be used by other organizations to access your [place] medical records  [id number]        Your Vitals Were     Pulse Temperature Pulse Oximetry BMI (Body Mass Index)          87 98.3  F (36.8  C) (Oral) 95% 32.52 kg/m2         Blood Pressure from Last 3 Encounters:   04/08/18 140/88   04/02/18 138/80   03/27/17 138/82    Weight from Last 3 Encounters:   04/08/18 224 lb 9.6 oz (101.9 kg)   04/02/18 226 lb (102.5 kg)   03/27/17 221 lb (100.2 kg)              Today, you had the following     No orders found for display         Today's Medication Changes          These changes are accurate as of 4/8/18 10:45 AM.  If you have any questions, ask your nurse or doctor.               Start taking these medicines.        Dose/Directions    cetirizine 10 MG tablet   Commonly known as:  zyrTEC   Used for:  Acute seasonal allergic rhinitis due to other allergen   Started by:  Nazia Youngblood MD        Dose:  10 mg   Take 1 tablet (10 mg) by mouth every evening   Quantity:  30 tablet   Refills:  1       fluticasone 50 MCG/ACT spray   Commonly known as:  FLONASE   Used for:  Acute seasonal allergic rhinitis due to other allergen   Started by:  Nazia Youngblood MD        Dose:  1-2 spray   Spray 1-2 sprays into both nostrils daily   Quantity:  16 g   Refills:  0            Where to get your medicines      These medications were sent to [id number] - [facility] [address] AT [place] [address]  [address] [facility] [address]     Phone:  [phone]     cetirizine 10 MG tablet    fluticasone 50 MCG/ACT spray                Primary Care Provider Office Phone # Fax #    Max Welch [phone]       [address]  [facility] [place] [address]        Equal Access to Services      [facility]: Hadii aad ku danna Sozhangali, waaxda luqadaha, qaybta kaalmada ademary, mayank abran josenoelle bennett ashelymouna hudson . So [facility] [phone].    ATENCIÓN: Si habla espclifford, tiene a orosco disposición servicios gratuitos de asistencia lingüística. Llame al [phone].    We comply with applicable federal civil rights laws and [place] laws. We do not discriminate on the basis of race, color, national origin, age, disability, sex, sexual orientation, or gender identity.            Thank you!     Thank you for choosing [facility]  for your care. Our goal is always to provide you with excellent care. Hearing back from our patients is one way we can continue to improve our services. Please take a few minutes to complete the written survey that you may receive in the mail after your visit with us. Thank you!             Your Updated Medication List - Protect others around you: Learn how to safely use, store and throw away your medicines at www.disposemymeds.org.          This list is accurate as of 4/8/18 10:45 AM.  Always use your most recent med list.                   Brand Name Dispense Instructions for use Diagnosis    cetirizine 10 MG tablet    zyrTEC    30 tablet    Take 1 tablet (10 mg) by mouth every evening    Acute seasonal allergic rhinitis due to other allergen       fluconazole 200 MG tablet    DIFLUCAN    4 tablet    Take 1 tablet (200 mg) by mouth daily 2 tablets as needed, repeat 2 tablets in 2 weeks    Tinea corporis       fluticasone 50 MCG/ACT spray    FLONASE    16 g    Spray 1-2 sprays into both nostrils daily    Acute seasonal allergic rhinitis due to other allergen       lisinopril 20 MG tablet    PRINIVIL/ZESTRIL    90 tablet    Take 1 tablet (20 mg) by mouth daily    Hypertension with goal blood pressure less than 140/90       simvastatin 40 MG tablet    ZOCOR    90 tablet    Take 1 tablet (40 mg) by mouth At Bedtime Due for recheck in March 2017     Hyperlipidemia LDL goal <130

## 2018-04-08 NOTE — NURSING NOTE
"Chief Complaint   Patient presents with     URI     Patient complains of cold symptoms       Initial BP (!) 163/99 (BP Location: Left arm, Patient Position: Chair, Cuff Size: Adult Regular)  Pulse 87  Temp 98.3  F (36.8  C) (Oral)  Wt 224 lb 9.6 oz (101.9 kg)  SpO2 95%  BMI 32.52 kg/m2 Estimated body mass index is 32.52 kg/(m^2) as calculated from the following:    Height as of 4/2/18: 5' 9.69\" (1.77 m).    Weight as of this encounter: 224 lb 9.6 oz (101.9 kg).  Medication Reconciliation: complete       Rosio Lance    "

## 2018-05-04 ENCOUNTER — TRANSFERRED RECORDS (OUTPATIENT)
Dept: HEALTH INFORMATION MANAGEMENT | Facility: CLINIC | Age: 63
End: 2018-05-04

## 2018-05-07 ENCOUNTER — OFFICE VISIT (OUTPATIENT)
Dept: FAMILY MEDICINE | Facility: CLINIC | Age: 63
End: 2018-05-07
Payer: COMMERCIAL

## 2018-05-07 VITALS
BODY MASS INDEX: 32.43 KG/M2 | HEART RATE: 62 BPM | TEMPERATURE: 97.2 F | WEIGHT: 224 LBS | SYSTOLIC BLOOD PRESSURE: 127 MMHG | OXYGEN SATURATION: 95 % | DIASTOLIC BLOOD PRESSURE: 86 MMHG

## 2018-05-07 DIAGNOSIS — I44.7 LBBB (LEFT BUNDLE BRANCH BLOCK): ICD-10-CM

## 2018-05-07 DIAGNOSIS — R94.31 ABNORMAL ELECTROCARDIOGRAM: Primary | ICD-10-CM

## 2018-05-07 PROCEDURE — 99214 OFFICE O/P EST MOD 30 MIN: CPT | Performed by: FAMILY MEDICINE

## 2018-05-07 PROCEDURE — 93000 ELECTROCARDIOGRAM COMPLETE: CPT | Performed by: FAMILY MEDICINE

## 2018-05-07 NOTE — PATIENT INSTRUCTIONS
Understanding Left Bundle Branch Block  Left bundle branch block is a problem in the heart s electrical system. One of the 2 main wires in the heart is not functioning correctly. This results in the heart's main pumping chambers to squeeze out of sync. It doesn t usually occur in young or healthy people. It s more common in older adults and people with serious heart disease. It can worsen the heart s ability to pump in people with heart failure.  What is left bundle branch block?  The heart uses electrical signals to keep pumping normally. With left bundle branch block, some signals are slowed when they pass through the heart. There is a problem with the left branch of the system that sends the electrical signal to the left ventricle. The signal can t travel down this path the way it should. The signal still gets to the left ventricle, but it is slow compared with the right. Because of this, the left ventricle contracts a little later than it should. This can cause the heart to pump less efficiently. This condition may be intermittent but most of the time it is permanent.  What causes left bundle branch block?  Left bundle branch block occurs in some people with no know risk factors or heart problems. But, most of the time it is associated with some type of heart abnormality. They include:    Any disease of the heart s electrical system that may be seen with advancing age    Severe disease of the arteries that send blood to the heart muscle (coronary artery disease)    Long-term high blood pressure (hypertension) with thickened heart muscle    Heart valve disease, such as aortic stenosis    Enlarged, weakened heart muscle (cardiomyopathy)    Infection of the heart muscle (myocarditis) from any cause    Heart attack (myocardial infarction or MI)  Symptoms of left bundle branch block  Left bundle branch block doesn t often cause symptoms on its own. But in some cases you may have symptoms such  as:    Dizziness    Fainting    Trouble breathing with physical activity  If you have heart failure, left bundle branch block can make symptoms worse.  Diagnosing left bundle branch block  Your healthcare provider will ask about your health history and symptoms. He or she will give you a physical exam. You may have tests such as:    Electrocardiography (ECG). Sticky pads are put on your chest. Wires are attached and connected to a machine. The machine checks your heart rhythm. The results of the ECG may show abnormal changes that mean left bundle branch block. You may have left bundle branch block diagnosed while having electrocardiography (ECG) for other reasons. Left bundle branch block may be the first sign of an undiagnosed heart problem.    Echocardiography (Echo). Sound waves are used to check the blood flow and movement of the heart.    Exercise stress testing. An ECG is done while you exercise. This test checks your heart under stress.    Cardiac CT or MRI. These tests help visualize the muscle layers and structures of the heart.    Blood tests. These are done to check fat (lipid) and sugar (glucose) levels in the blood.  You will likely be referred to a heart doctor (cardiologist).  Date Last Reviewed: 5/1/2016 2000-2017 [employer]. [address] [place] [address]. All rights reserved. This information is not intended as a substitute for professional medical care. Always follow your healthcare professional's instructions.        Treatment for Left Bundle Branch Block  Left bundle branch block is a problem in the heart s electrical system. It causes the left lower chamber (ventricle) of the heart to pump more slowly than normal. It doesn t usually occur in young or healthy people. It s more common in older adults and people with serious heart disease. It can worsen the heart s ability to pump in people with heart failure.  Types of treatment  If you have no symptoms or other heart  problems, you may not need treatment. If you have symptoms or other heart problems, you may need treatment such as:    Pacemaker. A device is put into your chest to help your heart pump normally.    Cardiac resynchronization therapy (CRT). A special pacemaker device is put into your chest. It helps the lower chambers of the heart (ventricles) pump normally. CRT may be used if you have heart failure and left bundle branch block.  Possible complications of left bundle branch block  People with no symptoms and no other heart problems are unlikely to have complications. In other cases, complications may include:    Heart failure    Heart that stops beating (cardiac arrest)    Death  Living with left bundle branch block  You can help manage your condition. Make sure to:    Tell all of your healthcare providers you have this heart condition.    Keep track of your symptoms carefully.    See your healthcare provider regularly, even if you don t have symptoms.    Control your blood fat (lipid), blood sugar, and blood pressure levels.    Eat heart-healthy foods.    Limit the amount of salt in your diet.    Be physically active.    Quit smoking, if you smoke.  When to call 911  Call 911 if you have any of these:    Chest pain    Fainting    Severe trouble breathing  When to call your healthcare provider  Call your healthcare provider if you have either of these:    Symptoms that gets worse    New symptoms   Date Last Reviewed: 8/10/2015    3841-5125 [employer]. [address] [place] [address], [place] [address]. All rights reserved. This information is not intended as a substitute for professional medical care. Always follow your healthcare professional's instructions.

## 2018-05-07 NOTE — MR AVS SNAPSHOT
After Visit Summary   5/7/2018    Mitchell Randolph    MRN: [id number]           Patient Information     Date Of Birth          1955        Visit Information        Provider Department      5/7/2018 5:00 PM Todd Oh MD [facility]        Today's Diagnoses     Abnormal electrocardiogram    -  1    LBBB (left bundle branch block)          Care Instructions      Understanding Left Bundle Branch Block  Left bundle branch block is a problem in the heart s electrical system. One of the 2 main wires in the heart is not functioning correctly. This results in the heart's main pumping chambers to squeeze out of sync. It doesn t usually occur in young or healthy people. It s more common in older adults and people with serious heart disease. It can worsen the heart s ability to pump in people with heart failure.  What is left bundle branch block?  The heart uses electrical signals to keep pumping normally. With left bundle branch block, some signals are slowed when they pass through the heart. There is a problem with the left branch of the system that sends the electrical signal to the left ventricle. The signal can t travel down this path the way it should. The signal still gets to the left ventricle, but it is slow compared with the right. Because of this, the left ventricle contracts a little later than it should. This can cause the heart to pump less efficiently. This condition may be intermittent but most of the time it is permanent.  What causes left bundle branch block?  Left bundle branch block occurs in some people with no know risk factors or heart problems. But, most of the time it is associated with some type of heart abnormality. They include:    Any disease of the heart s electrical system that may be seen with advancing age    Severe disease of the arteries that send blood to the heart muscle (coronary artery disease)    Long-term high blood pressure (hypertension)  with thickened heart muscle    Heart valve disease, such as aortic stenosis    Enlarged, weakened heart muscle (cardiomyopathy)    Infection of the heart muscle (myocarditis) from any cause    Heart attack (myocardial infarction or MI)  Symptoms of left bundle branch block  Left bundle branch block doesn t often cause symptoms on its own. But in some cases you may have symptoms such as:    Dizziness    Fainting    Trouble breathing with physical activity  If you have heart failure, left bundle branch block can make symptoms worse.  Diagnosing left bundle branch block  Your healthcare provider will ask about your health history and symptoms. He or she will give you a physical exam. You may have tests such as:    Electrocardiography (ECG). Sticky pads are put on your chest. Wires are attached and connected to a machine. The machine checks your heart rhythm. The results of the ECG may show abnormal changes that mean left bundle branch block. You may have left bundle branch block diagnosed while having electrocardiography (ECG) for other reasons. Left bundle branch block may be the first sign of an undiagnosed heart problem.    Echocardiography (Echo). Sound waves are used to check the blood flow and movement of the heart.    Exercise stress testing. An ECG is done while you exercise. This test checks your heart under stress.    Cardiac CT or MRI. These tests help visualize the muscle layers and structures of the heart.    Blood tests. These are done to check fat (lipid) and sugar (glucose) levels in the blood.  You will likely be referred to a heart doctor (cardiologist).  Date Last Reviewed: 5/1/2016 2000-2017 [employer]. [address] [place] [address]. All rights reserved. This information is not intended as a substitute for professional medical care. Always follow your healthcare professional's instructions.        Treatment for Left Bundle Branch Block  Left bundle branch block is a  problem in the heart s electrical system. It causes the left lower chamber (ventricle) of the heart to pump more slowly than normal. It doesn t usually occur in young or healthy people. It s more common in older adults and people with serious heart disease. It can worsen the heart s ability to pump in people with heart failure.  Types of treatment  If you have no symptoms or other heart problems, you may not need treatment. If you have symptoms or other heart problems, you may need treatment such as:    Pacemaker. A device is put into your chest to help your heart pump normally.    Cardiac resynchronization therapy (CRT). A special pacemaker device is put into your chest. It helps the lower chambers of the heart (ventricles) pump normally. CRT may be used if you have heart failure and left bundle branch block.  Possible complications of left bundle branch block  People with no symptoms and no other heart problems are unlikely to have complications. In other cases, complications may include:    Heart failure    Heart that stops beating (cardiac arrest)    Death  Living with left bundle branch block  You can help manage your condition. Make sure to:    Tell all of your healthcare providers you have this heart condition.    Keep track of your symptoms carefully.    See your healthcare provider regularly, even if you don t have symptoms.    Control your blood fat (lipid), blood sugar, and blood pressure levels.    Eat heart-healthy foods.    Limit the amount of salt in your diet.    Be physically active.    Quit smoking, if you smoke.  When to call 911  Call 911 if you have any of these:    Chest pain    Fainting    Severe trouble breathing  When to call your healthcare provider  Call your healthcare provider if you have either of these:    Symptoms that gets worse    New symptoms   Date Last Reviewed: 8/10/2015    [id number] [employer]. [address] [place] [address], [place] [address]. All rights reserved. This  information is not intended as a substitute for professional medical care. Always follow your healthcare professional's instructions.                Follow-ups after your visit        Additional Services     CARDIOLOGY EVAL ADULT REFERRAL       Preferred location:  [facility] [phone]   [email or web address]    Please be aware that coverage of these services is subject to the terms and limitations of your health insurance plan.  Call member services at your health plan with any benefit or coverage questions.      Please bring the following to your appointment:  Any x-rays, CTs or MRIs which have been performed. Contact the facility where they were done to arrange for  prior to your scheduled appointment.    List of current medications  This referral request   Any documents/labs given to you for this referral                  Who to contact     If you have questions or need follow up information about today's clinic visit or your schedule please contact [facility] directly at [phone].  Normal or non-critical lab and imaging results will be communicated to you by [employer], letter or phone within 4 business days after the clinic has received the results. If you do not hear from us within 7 days, please contact the clinic through [employer] or phone. If you have a critical or abnormal lab result, we will notify you by phone as soon as possible.  Submit refill requests through [employer] or call your pharmacy and they will forward the refill request to us. Please allow 3 business days for your refill to be completed.          Additional Information About Your Visit        [employer] [facility] Information     [employer] gives you secure access to your electronic health record. If you see a primary care provider, you can also send messages to your care team and make appointments. If you have questions, please call your primary care clinic.  If  you do not have a primary care provider, please call [phone] and they will assist you.        Care EveryWhere ID     This is your Care EveryWhere ID. This could be used by other organizations to access your [place] medical records  [id number]        Your Vitals Were     Pulse Temperature Pulse Oximetry BMI (Body Mass Index)          62 97.2  F (36.2  C) (Oral) 95% 32.43 kg/m2         Blood Pressure from Last 3 Encounters:   05/07/18 127/86   04/08/18 140/88   04/02/18 138/80    Weight from Last 3 Encounters:   05/07/18 224 lb (101.6 kg)   04/08/18 224 lb 9.6 oz (101.9 kg)   04/02/18 226 lb (102.5 kg)              We Performed the Following     CARDIOLOGY EVAL ADULT REFERRAL     EKG 12-lead complete w/read - Clinics        Primary Care Provider Office Phone # Fax #    Max Welch [phone]       [address] [facility] [address]        Equal Access to Services     [facility]: Hadii aad ku hadasho Soomaali, waaxda luqadaha, qaybta kaalmada adeegyada, waxay idiin hayajitn donald [facility] . So [facility] [phone].    ATENCIÓN: Si habla español, tiene a orosco disposición servicios gratuitos de asistencia lingüística. Llame al [phone].    We comply with applicable federal civil rights laws and [place] laws. We do not discriminate on the basis of race, color, national origin, age, disability, sex, sexual orientation, or gender identity.            Thank you!     Thank you for choosing [facility]  for your care. Our goal is always to provide you with excellent care. Hearing back from our patients is one way we can continue to improve our services. Please take a few minutes to complete the written survey that you may receive in the mail after your visit with us. Thank you!             Your Updated Medication List - Protect others around you: Learn how to safely use, store and throw away your medicines at www.disposemymeds.org.          This list is  accurate as of 5/7/18  5:23 PM.  Always use your most recent med list.                   Brand Name Dispense Instructions for use Diagnosis    cetirizine 10 MG tablet    zyrTEC    30 tablet    Take 1 tablet (10 mg) by mouth every evening    Acute seasonal allergic rhinitis due to other allergen       fluconazole 200 MG tablet    DIFLUCAN    4 tablet    Take 1 tablet (200 mg) by mouth daily 2 tablets as needed, repeat 2 tablets in 2 weeks    Tinea corporis       fluticasone 50 MCG/ACT spray    FLONASE    16 g    Spray 1-2 sprays into both nostrils daily    Acute seasonal allergic rhinitis due to other allergen       lisinopril 20 MG tablet    PRINIVIL/ZESTRIL    90 tablet    Take 1 tablet (20 mg) by mouth daily    Hypertension with goal blood pressure less than 140/90       simvastatin 40 MG tablet    ZOCOR    90 tablet    Take 1 tablet (40 mg) by mouth At Bedtime Due for recheck in March 2017    Hyperlipidemia LDL goal <130

## 2018-05-07 NOTE — PROGRESS NOTES
SUBJECTIVE:   Mitchell Randolph is a 62 year old male who presents to clinic today for the following health issues:    Go over his EKG from his Colonoscopy    Come in today because rhythm strip showed  Bundle branch block when getting a colonoscopy.   He is not having any symptoms to correlate with problems     Denies chest pain   Denies sob   No family history of heart disease     Cholesterols look fairly normal ; controlled with simvastatin 40   Patient has never been a smoker   HTN controlled by lisinopril 20 mg tabs     Patient states we can speak to his wife and we will get a release from him today for this     Past Medical History:   Diagnosis Date     High cholesterol        History reviewed. No pertinent surgical history.    Family History   Problem Relation Age of Onset     CANCER Maternal Grandfather      bone       Social History   Substance Use Topics     Smoking status: Never Smoker     Smokeless tobacco: Never Used     Alcohol use Yes      Comment: 2 beers per week     .Ros: no chest pain, chest tightness   No sob   No leg edema   No abdominal pain     Denies fever or chills   Weight stable     Current Outpatient Prescriptions   Medication Sig Dispense Refill     cetirizine (ZYRTEC) 10 MG tablet Take 1 tablet (10 mg) by mouth every evening (Patient not taking: Reported on 5/7/2018) 30 tablet 1     fluconazole (DIFLUCAN) 200 MG tablet Take 1 tablet (200 mg) by mouth daily 2 tablets as needed, repeat 2 tablets in 2 weeks (Patient not taking: Reported on 4/8/2018) 4 tablet 6     fluticasone (FLONASE) 50 MCG/ACT spray Spray 1-2 sprays into both nostrils daily (Patient not taking: Reported on 5/7/2018) 16 g 0     lisinopril (PRINIVIL/ZESTRIL) 20 MG tablet Take 1 tablet (20 mg) by mouth daily 90 tablet 3     simvastatin (ZOCOR) 40 MG tablet Take 1 tablet (40 mg) by mouth At Bedtime Due for recheck in March 2017 90 tablet 3      No Known Allergies           O; /86 (BP Location: Left arm, Patient Position:  Sitting, Cuff Size: Adult Large)  Pulse 62  Temp 97.2  F (36.2  C) (Oral)  Wt 224 lb (101.6 kg)  SpO2 95%  BMI 32.43 kg/m2      BP Readings from Last 3 Encounters:   05/07/18 127/86   04/08/18 140/88   04/02/18 138/80       Wt Readings from Last 4 Encounters:   05/07/18 224 lb (101.6 kg)   04/08/18 224 lb 9.6 oz (101.9 kg)   04/02/18 226 lb (102.5 kg)   03/27/17 221 lb (100.2 kg)     Chest wall normal to inspection and palpation. Good excursion bilaterally. Lungs clear to auscultation. Good air movement bilaterally without rales, wheezes, or rhonchi.   Regular rate and  rhythm. S1 and S2 normal, no murmurs, clicks, gallops or rubs. No edema or JVD.      ICD-10-CM    1. Abnormal electrocardiogram R94.31 EKG 12-lead complete w/read - Clinics     CARDIOLOGY EVAL ADULT REFERRAL   2. LBBB (left bundle branch block) I44.7 CARDIOLOGY EVAL ADULT REFERRAL       [facility]    Since patient is asymptomatic they suggested we do an echocardiogram.  I have elected to have hm followed up with cardiology and they can do the echo and assess him.

## 2018-05-09 ENCOUNTER — MYC MEDICAL ADVICE (OUTPATIENT)
Dept: FAMILY MEDICINE | Facility: CLINIC | Age: 63
End: 2018-05-09

## 2018-05-09 NOTE — TELEPHONE ENCOUNTER
Sent patient my chart message confirming appointment 06/11/2018 2:40 pm with Dr William. JONATHON Lew

## 2018-06-25 ENCOUNTER — OFFICE VISIT (OUTPATIENT)
Dept: CARDIOLOGY | Facility: CLINIC | Age: 63
End: 2018-06-25
Payer: COMMERCIAL

## 2018-06-25 VITALS
SYSTOLIC BLOOD PRESSURE: 144 MMHG | OXYGEN SATURATION: 95 % | WEIGHT: 222 LBS | DIASTOLIC BLOOD PRESSURE: 84 MMHG | BODY MASS INDEX: 32.14 KG/M2 | HEART RATE: 75 BPM

## 2018-06-25 DIAGNOSIS — I44.7 LBBB (LEFT BUNDLE BRANCH BLOCK): Primary | ICD-10-CM

## 2018-06-25 PROCEDURE — 99203 OFFICE O/P NEW LOW 30 MIN: CPT | Mod: GC | Performed by: INTERNAL MEDICINE

## 2018-06-25 NOTE — NURSING NOTE
Cardiac Testing: Patient given instructions regarding  echocardiogram . Discussed purpose, preparation, procedure and when to expect results reported back to the patient. Patient demonstrated understanding of this information and agreed to call with further questions or concerns.  Results will determine need for follow up  Med Reconcile: Reviewed and verified all current medications with the patient. The updated medication list was printed and given to the patient.  Patient stated he understood all health information given and agreed to call with further questions or concerns.  Nafisa Cortes RN

## 2018-06-25 NOTE — MR AVS SNAPSHOT
After Visit Summary   6/25/2018    Mitchell Randolph    MRN: [id number]           Patient Information     Date Of Birth          1955        Visit Information        Provider Department      6/25/2018 8:00 AM Lucero William MD [facility] AT [facility]        Today's Diagnoses     LBBB (left bundle branch block)    -  1      Care Instructions    Thank you for coming to the [facility]; please note the following instructions:    1. Dr. Lucero William has ordered an echocardiogram to be performed in [place].  You are scheduled for Friday, June 29th at 3:00 pm  Echocardiogram Instructions:  -Wear comfortable clothing  -Refrain from wearing perfumes or scented lotions    2.  Results will be sent via [employer] or phone.  Results will determine the next step.      If you have any questions regarding your visit please contact your care team:     Cardiology  Telephone Number   Nafisa WISEMAN, ASHER BECKFORD, RN   Thalia BENJAMIN, FÉLIX MORALES MA   [phone]    *After hours: [phone]   For scheduling appts:     [phone] or    [phone] (select option 1)    *After hours: [phone]     For the Device Clinic (Pacemakers and ICD's)  RN's :  Danuta López   During business hours: [phone]    *After business hours:  [phone] (select option 4)      Normal test result notifications will be released via [employer] or mailed within 7 business days.  All other test results, will be communicated via telephone once reviewed by your cardiologist.    If you need a medication refill please contact your pharmacy.  Please allow 3 business days for your refill to be completed.    As always, thank you for trusting us with your health care needs!            Follow-ups after your visit        Your next 10 appointments already scheduled     Jun 29, 2018  3:00 PM CDT   Ech Complete with [id number]   [facility])    [address] [place] [address] [address] [facility] [id number] [phone]           1. Please bring or wear a comfortable two-piece outfit. 2. You may eat, drink and take your normal medicines. 3. For any questions that cannot be answered, please contact the ordering physician              Future tests that were ordered for you today     Open Future Orders        Priority Expected Expires Ordered    Echocardiogram Complete Routine  6/25/2019 6/25/2018            Who to contact     If you have questions or need follow up information about today's clinic visit or your schedule please contact [facility] PHYSIC[facility] HEART AT [facility] directly at [phone].  Normal or non-critical lab and imaging results will be communicated to you by Intercomhart, letter or phone within 4 business days after the clinic has received the results. If you do not hear from us within 7 days, please contact the clinic through [employer] or phone. If you have a critical or abnormal lab result, we will notify you by phone as soon as possible.  Submit refill requests through [email or web address] or call your pharmacy and they will forward the refill request to us. Please allow 3 business days for your refill to be completed.          Additional Information About Your Visit        [employer] Information     [email or web address] gives you secure access to your electronic health record. If you see a primary care provider, you can also send messages to your care team and make appointments. If you have questions, please call your primary care clinic.  If you do not have a primary care provider, please call [phone] and they will assist you.        Care EveryWhere ID     This is your Care EveryWhere ID. This could be used by other organizations to access your [place] medical records  [id number]        Your Vitals Were     Pulse Pulse Oximetry BMI (Body Mass Index)             75 95% 32.14 kg/m2          Blood Pressure from Last 3 Encounters:    06/25/18 144/84   05/07/18 127/86   04/08/18 140/88    Weight from Last 3 Encounters:   06/25/18 100.7 kg (222 lb)   05/07/18 101.6 kg (224 lb)   04/08/18 101.9 kg (224 lb 9.6 oz)               Primary Care Provider Office Phone # Fax #    Max Welch [phone]       [address] [facility] [address]        Equal Access to Services     [facility]: Hadii aad ku hadasho Soomaali, waaxda luqadaha, qaybta kaalmada adeegyada, waxay idiin hayaan adeeg khumair [place] . So [facility] [phone].    ATENCIÓN: Si habla español, tiene a orosco disposición servicios gratuitos de asistencia lingüística. [facility] [phone].    We comply with applicable federal civil rights laws and [place] laws. We do not discriminate on the basis of race, color, national origin, age, disability, sex, sexual orientation, or gender identity.            Thank you!     Thank you for choosing [facility] PHYSICIANS HEART AT [facility]  for your care. Our goal is always to provide you with excellent care. Hearing back from our patients is one way we can continue to improve our services. Please take a few minutes to complete the written survey that you may receive in the mail after your visit with us. Thank you!             Your Updated Medication List - Protect others around you: Learn how to safely use, store and throw away your medicines at www.disposemymeds.org.          This list is accurate as of 6/25/18  8:32 AM.  Always use your most recent med list.                   Brand Name Dispense Instructions for use Diagnosis    cetirizine 10 MG tablet    zyrTEC    30 tablet    Take 1 tablet (10 mg) by mouth every evening    Acute seasonal allergic rhinitis due to other allergen       fluconazole 200 MG tablet    DIFLUCAN    4 tablet    Take 1 tablet (200 mg) by mouth daily 2 tablets as needed, repeat 2 tablets in 2 weeks    Tinea corporis       fluticasone 50 MCG/ACT spray    FLONASE    16 g     Spray 1-2 sprays into both nostrils daily    Acute seasonal allergic rhinitis due to other allergen       lisinopril 20 MG tablet    PRINIVIL/ZESTRIL    90 tablet    Take 1 tablet (20 mg) by mouth daily    Hypertension with goal blood pressure less than 140/90       simvastatin 40 MG tablet    ZOCOR    90 tablet    Take 1 tablet (40 mg) by mouth At Bedtime Due for recheck in March 2017    Hyperlipidemia LDL goal <130

## 2018-06-25 NOTE — LETTER
6/25/2018      RE: Mitchell Randolph  [address] [facility] [address]       Dear Colleague,    Thank you for the opportunity to participate in the care of your patient, Mitchell Randolph, at the [facility] HEART AT [facility] at [facility]. Please see a copy of my visit note below.    Reason for Consult: New LBBB    HPI:   Mr. Randolph is a 61yo M with HTN and HLD who is presenting to cardiology clinic after being found to have a LBBB obtained during pre colonoscopy routine ecg. Mitchell reports otherwise feeling well. He denies any chest pain, palpitations, CROWDER, leg swelling, dizzy spells, orthopnea or PND. He reports not exercising as much as he should but was able to walk around [facility] recently (3miles) without difficulty. 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 He denies any prior heart attacks or cardiac hx. Minimal etoh intake, and no smoking.    PAST MEDICAL HISTORY:  Past Medical History:   Diagnosis Date     High cholesterol        CURRENT MEDICATIONS:  Current Outpatient Prescriptions   Medication Sig Dispense Refill     lisinopril (PRINIVIL/ZESTRIL) 20 MG tablet Take 1 tablet (20 mg) by mouth daily 90 tablet 3     simvastatin (ZOCOR) 40 MG tablet Take 1 tablet (40 mg) by mouth At Bedtime Due for recheck in March 2017 90 tablet 3     cetirizine (ZYRTEC) 10 MG tablet Take 1 tablet (10 mg) by mouth every evening (Patient not taking: Reported on 5/7/2018) 30 tablet 1     fluconazole (DIFLUCAN) 200 MG tablet Take 1 tablet (200 mg) by mouth daily 2 tablets as needed, repeat 2 tablets in 2 weeks (Patient not taking: Reported on 4/8/2018) 4 tablet 6     fluticasone (FLONASE) 50 MCG/ACT spray Spray 1-2 sprays into both nostrils daily (Patient not taking: Reported on 5/7/2018) 16 g 0       PAST SURGICAL HISTORY:  History reviewed. No pertinent surgical  history.    ALLERGIES:   No Known Allergies    FAMILY HISTORY:  - Premature coronary artery disease  - Atrial fibrillation  - Sudden cardiac death     SOCIAL HISTORY:  Social History   Substance Use Topics     Smoking status: Never Smoker     Smokeless tobacco: Never Used     Alcohol use Yes      Comment: 2 beers per week       ROS:   Constitutional: No fever, chills, or sweats. Weight stable.   ENT: No visual disturbance, ear ache, epistaxis, sore throat.   Cardiovascular: As per HPI.   Respiratory: No cough, hemoptysis.    GI: No nausea, vomiting, hematemesis, melena, or hematochezia.   : No hematuria.   Integument: Negative.   Psychiatric: Negative.   Hematologic:  Easy bruising, no easy bleeding.  Neuro: Negative.   Endocrinology: No significant heat or cold intolerance   Musculoskeletal: No myalgia.    Exam:  /84 (BP Location: Right arm, Patient Position: Chair, Cuff Size: Adult Large)  Pulse 75  Wt 100.7 kg (222 lb)  SpO2 95%  BMI 32.14 kg/m2  GENERAL APPEARANCE: healthy, alert and no distress  HEENT: no icterus, no xanthelasmas, normal pupil size and reaction, normal palate, mucosa moist, no central cyanosis  NECK: no bruits, JVP not elevated  RESPIRATORY: lungs clear to auscultation - no rales, rhonchi or wheezes, no use of accessory muscles, no retractions, respirations are unlabored, normal respiratory rate  CARDIOVASCULAR: regular rhythm, normal S1 with physiologic split S2, no S3 or S4 and no murmur, click or rub, precordium quiet with normal PMI.  ABDOMEN: soft, non tender, without hepatosplenomegaly,   EXTREMITIES: peripheral pulses normal, no edema, no bruits  NEURO: alert and oriented  normal speech, gait and affect  VASC: Radial are normal in volumes and symmetric bilaterally.   SKIN: no ecchymoses, no rashes    ECG: Sinus rhythm with LBBB    Labs:  CBC RESULTS:   Lab Results   Component Value Date    WBC 6.6 03/24/2016    RBC 5.50 03/24/2016    HGB 16.4 03/24/2016    HCT 48.9 03/24/2016     MCV 89 03/24/2016    MCH 29.8 03/24/2016    MCHC 33.5 03/24/2016    RDW 13.4 03/24/2016     03/24/2016       BMP RESULTS:  Lab Results   Component Value Date     04/02/2018    POTASSIUM 4.3 04/02/2018    CHLORIDE 106 04/02/2018    CO2 31 04/02/2018    ANIONGAP 4 04/02/2018     (H) 04/02/2018    BUN 20 04/02/2018    CR 1.01 04/02/2018    GFRESTIMATED 75 04/02/2018    GFRESTBLACK >90 04/02/2018    JULIO 8.8 04/02/2018        INR RESULTS:  No results found for: INR      Assessment and Plan:   Mr. Randolph is a 63yo M with HTN and HLD who was found to have LBBB on routine ECG prior to colonoscopy. Mr. Randolph is currently asymptomatic without physical limitations and normal cardiac exam. At this time we recommend proceeding with TTE to ensure no structural heart disease.     -- TTE, if normal no further follow up needed  -- Continue risk factor modification as per primary care     Patient seen and discussed with Dr. William.     Farhad Raphael MD   Cardiology Fellow    I have seen, interviewed, and examined patient. I have reviewed the laboratory tests, imaging, and other investigations. I have reviewed the management plan with the patient. I discussed with the team and agree with the findings and plan in this resident/fellow/nurse practitioner's note. In addition, changes in the physical examination, assessment and plan have been incorporated into the note by myself, as to make it a single cohesive document.       Lucero William MD, MS  Cardiology/Cardiac EP Attending Staff        CC  Patient Care Team:  Max Welch MD as PCP - General

## 2018-06-25 NOTE — LETTER
6/25/2018    RE: Mitchell Randolph  [address] [facility] [address]     Reason for Consult: New LBBB    HPI:   Mr. Randolph is a 61yo M with HTN and HLD who is presenting to cardiology clinic after being found to have a LBBB obtained during pre colonoscopy routine ecg. Mitchell reports otherwise feeling well. He denies any chest pain, palpitations, CROWDER, leg swelling, dizzy spells, orthopnea or PND. He reports not exercising as much as he should but was able to walk around [facility] recently (3miles) without difficulty. He currently works at [employer] and acknowledges he needs to be more active, but denies any physical limitations to activity. He denies any prior heart attacks or cardiac hx. Minimal etoh intake, and no smoking.    PAST MEDICAL HISTORY:  Past Medical History:   Diagnosis Date     High cholesterol        CURRENT MEDICATIONS:  Current Outpatient Prescriptions   Medication Sig Dispense Refill     lisinopril (PRINIVIL/ZESTRIL) 20 MG tablet Take 1 tablet (20 mg) by mouth daily 90 tablet 3     simvastatin (ZOCOR) 40 MG tablet Take 1 tablet (40 mg) by mouth At Bedtime Due for recheck in March 2017 90 tablet 3     cetirizine (ZYRTEC) 10 MG tablet Take 1 tablet (10 mg) by mouth every evening (Patient not taking: Reported on 5/7/2018) 30 tablet 1     fluconazole (DIFLUCAN) 200 MG tablet Take 1 tablet (200 mg) by mouth daily 2 tablets as needed, repeat 2 tablets in 2 weeks (Patient not taking: Reported on 4/8/2018) 4 tablet 6     fluticasone (FLONASE) 50 MCG/ACT spray Spray 1-2 sprays into both nostrils daily (Patient not taking: Reported on 5/7/2018) 16 g 0       PAST SURGICAL HISTORY:  History reviewed. No pertinent surgical history.    ALLERGIES:   No Known Allergies    FAMILY HISTORY:  - Premature coronary artery disease  - Atrial fibrillation  - Sudden cardiac death     SOCIAL HISTORY:  Social History   Substance Use Topics     Smoking status: Never Smoker     Smokeless tobacco: Never Used     Alcohol  use Yes      Comment: 2 beers per week       ROS:   Constitutional: No fever, chills, or sweats. Weight stable.   ENT: No visual disturbance, ear ache, epistaxis, sore throat.   Cardiovascular: As per HPI.   Respiratory: No cough, hemoptysis.    GI: No nausea, vomiting, hematemesis, melena, or hematochezia.   : No hematuria.   Integument: Negative.   Psychiatric: Negative.   Hematologic:  Easy bruising, no easy bleeding.  Neuro: Negative.   Endocrinology: No significant heat or cold intolerance   Musculoskeletal: No myalgia.    Exam:  /84 (BP Location: Right arm, Patient Position: Chair, Cuff Size: Adult Large)  Pulse 75  Wt 100.7 kg (222 lb)  SpO2 95%  BMI 32.14 kg/m2  GENERAL APPEARANCE: healthy, alert and no distress  HEENT: no icterus, no xanthelasmas, normal pupil size and reaction, normal palate, mucosa moist, no central cyanosis  NECK: no bruits, JVP not elevated  RESPIRATORY: lungs clear to auscultation - no rales, rhonchi or wheezes, no use of accessory muscles, no retractions, respirations are unlabored, normal respiratory rate  CARDIOVASCULAR: regular rhythm, normal S1 with physiologic split S2, no S3 or S4 and no murmur, click or rub, precordium quiet with normal PMI.  ABDOMEN: soft, non tender, without hepatosplenomegaly,   EXTREMITIES: peripheral pulses normal, no edema, no bruits  NEURO: alert and oriented  normal speech, gait and affect  VASC: Radial are normal in volumes and symmetric bilaterally.   SKIN: no ecchymoses, no rashes    ECG: Sinus rhythm with LBBB    Labs:  CBC RESULTS:   Lab Results   Component Value Date    WBC 6.6 03/24/2016    RBC 5.50 03/24/2016    HGB 16.4 03/24/2016    HCT 48.9 03/24/2016    MCV 89 03/24/2016    MCH 29.8 03/24/2016    MCHC 33.5 03/24/2016    RDW 13.4 03/24/2016     03/24/2016       BMP RESULTS:  Lab Results   Component Value Date     04/02/2018    POTASSIUM 4.3 04/02/2018    CHLORIDE 106 04/02/2018    CO2 31 04/02/2018    ANIONGAP 4  04/02/2018     (H) 04/02/2018    BUN 20 04/02/2018    CR 1.01 04/02/2018    GFRESTIMATED 75 04/02/2018    GFRESTBLACK >90 04/02/2018    JULIO 8.8 04/02/2018        INR RESULTS:  No results found for: INR      Assessment and Plan:   Mr. Randolph is a 61yo M with HTN and HLD who was found to have LBBB on routine ECG prior to colonoscopy. Mr. Randolph is currently asymptomatic without physical limitations and normal cardiac exam. At this time we recommend proceeding with TTE to ensure no structural heart disease.     -- TTE, if normal no further follow up needed  -- Continue risk factor modification as per primary care     Patient seen and discussed with Dr. William.     Farhad Raphael MD   Cardiology Fellow    I have seen, interviewed, and examined patient. I have reviewed the laboratory tests, imaging, and other investigations. I have reviewed the management plan with the patient. I discussed with the team and agree with the findings and plan in this resident/fellow/nurse practitioner's note. In addition, changes in the physical examination, assessment and plan have been incorporated into the note by myself, as to make it a single cohesive document.       Lucero William MD, MS  Cardiology/Cardiac EP Attending Staff    CC  Patient Care Team:  Max Welch MD as PCP - General

## 2018-06-25 NOTE — PROGRESS NOTES
Reason for Consult: New LBBB    HPI:   Mr. Randolph is a 63yo M with HTN and HLD who is presenting to cardiology clinic after being found to have a LBBB obtained during pre colonoscopy routine ecg. Mitchell reports otherwise feeling well. He denies any chest pain, palpitations, CROWDER, leg swelling, dizzy spells, orthopnea or PND. He reports not exercising as much as he should but was able to walk around [facility] recently (3miles) without difficulty. 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 He denies any prior heart attacks or cardiac hx. Minimal etoh intake, and no smoking.    PAST MEDICAL HISTORY:  Past Medical History:   Diagnosis Date     High cholesterol        CURRENT MEDICATIONS:  Current Outpatient Prescriptions   Medication Sig Dispense Refill     lisinopril (PRINIVIL/ZESTRIL) 20 MG tablet Take 1 tablet (20 mg) by mouth daily 90 tablet 3     simvastatin (ZOCOR) 40 MG tablet Take 1 tablet (40 mg) by mouth At Bedtime Due for recheck in March 2017 90 tablet 3     cetirizine (ZYRTEC) 10 MG tablet Take 1 tablet (10 mg) by mouth every evening (Patient not taking: Reported on 5/7/2018) 30 tablet 1     fluconazole (DIFLUCAN) 200 MG tablet Take 1 tablet (200 mg) by mouth daily 2 tablets as needed, repeat 2 tablets in 2 weeks (Patient not taking: Reported on 4/8/2018) 4 tablet 6     fluticasone (FLONASE) 50 MCG/ACT spray Spray 1-2 sprays into both nostrils daily (Patient not taking: Reported on 5/7/2018) 16 g 0       PAST SURGICAL HISTORY:  History reviewed. No pertinent surgical history.    ALLERGIES:   No Known Allergies    FAMILY HISTORY:  - Premature coronary artery disease  - Atrial fibrillation  - Sudden cardiac death     SOCIAL HISTORY:  Social History   Substance Use Topics     Smoking status: Never Smoker     Smokeless tobacco: Never Used     Alcohol use Yes      Comment: 2 beers per week       ROS:   Constitutional: No fever,  chills, or sweats. Weight stable.   ENT: No visual disturbance, ear ache, epistaxis, sore throat.   Cardiovascular: As per HPI.   Respiratory: No cough, hemoptysis.    GI: No nausea, vomiting, hematemesis, melena, or hematochezia.   : No hematuria.   Integument: Negative.   Psychiatric: Negative.   Hematologic:  Easy bruising, no easy bleeding.  Neuro: Negative.   Endocrinology: No significant heat or cold intolerance   Musculoskeletal: No myalgia.    Exam:  /84 (BP Location: Right arm, Patient Position: Chair, Cuff Size: Adult Large)  Pulse 75  Wt 100.7 kg (222 lb)  SpO2 95%  BMI 32.14 kg/m2  GENERAL APPEARANCE: healthy, alert and no distress  HEENT: no icterus, no xanthelasmas, normal pupil size and reaction, normal palate, mucosa moist, no central cyanosis  NECK: no bruits, JVP not elevated  RESPIRATORY: lungs clear to auscultation - no rales, rhonchi or wheezes, no use of accessory muscles, no retractions, respirations are unlabored, normal respiratory rate  CARDIOVASCULAR: regular rhythm, normal S1 with physiologic split S2, no S3 or S4 and no murmur, click or rub, precordium quiet with normal PMI.  ABDOMEN: soft, non tender, without hepatosplenomegaly,   EXTREMITIES: peripheral pulses normal, no edema, no bruits  NEURO: alert and oriented  normal speech, gait and affect  VASC: Radial are normal in volumes and symmetric bilaterally.   SKIN: no ecchymoses, no rashes    ECG: Sinus rhythm with LBBB    Labs:  CBC RESULTS:   Lab Results   Component Value Date    WBC 6.6 03/24/2016    RBC 5.50 03/24/2016    HGB 16.4 03/24/2016    HCT 48.9 03/24/2016    MCV 89 03/24/2016    MCH 29.8 03/24/2016    MCHC 33.5 03/24/2016    RDW 13.4 03/24/2016     03/24/2016       BMP RESULTS:  Lab Results   Component Value Date     04/02/2018    POTASSIUM 4.3 04/02/2018    CHLORIDE 106 04/02/2018    CO2 31 04/02/2018    ANIONGAP 4 04/02/2018     (H) 04/02/2018    BUN 20 04/02/2018    CR 1.01 04/02/2018     GFRESTIMATED 75 04/02/2018    GFRESTBLACK >90 04/02/2018    JULIO 8.8 04/02/2018        INR RESULTS:  No results found for: INR      Assessment and Plan:   Mr. Randolph is a 61yo M with HTN and HLD who was found to have LBBB on routine ECG prior to colonoscopy. Mr. Randolph is currently asymptomatic without physical limitations and normal cardiac exam. At this time we recommend proceeding with TTE to ensure no structural heart disease.     -- TTE, if normal no further follow up needed  -- Continue risk factor modification as per primary care     Patient seen and discussed with Dr. William.     Farhad Raphael MD   Cardiology Fellow    I have seen, interviewed, and examined patient. I have reviewed the laboratory tests, imaging, and other investigations. I have reviewed the management plan with the patient. I discussed with the team and agree with the findings and plan in this resident/fellow/nurse practitioner's note. In addition, changes in the physical examination, assessment and plan have been incorporated into the note by myself, as to make it a single cohesive document.       Lucero William MD, MS  Cardiology/Cardiac EP Attending Staff        CC  Patient Care Team:  Max Welch MD as PCP - General

## 2018-06-25 NOTE — PATIENT INSTRUCTIONS
Thank you for coming to the [facility] @ [facility]; please note the following instructions:    1. Dr. Lucero William has ordered an echocardiogram to be performed in [place].  You are scheduled for Friday, June 29th at 3:00 pm  Echocardiogram Instructions:  -Wear comfortable clothing  -Refrain from wearing perfumes or scented lotions    2.  Results will be sent via [employer] or phone.  Results will determine the next step.      If you have any questions regarding your visit please contact your care team:     Cardiology  Telephone Number   Nafisa WISEMAN, RN  Ne BECKFORD, RN   Thalia BENJAMIN, FÉLIX MORALES MA   [phone]    *After hours: [phone]   For scheduling appts:     [phone] or    [phone] (select option 1)    *After hours: [phone]     For the Device Clinic (Pacemakers and ICD's)  RN's :  Danuta López   During business hours: [phone]    *After business hours:  [phone] (select option 4)      Normal test result notifications will be released via [employer] or mailed within 7 business days.  All other test results, will be communicated via telephone once reviewed by your cardiologist.    If you need a medication refill please contact your pharmacy.  Please allow 3 business days for your refill to be completed.    As always, thank you for trusting us with your health care needs!

## 2018-06-29 ENCOUNTER — RADIANT APPOINTMENT (OUTPATIENT)
Dept: CARDIOLOGY | Facility: CLINIC | Age: 63
End: 2018-06-29
Attending: INTERNAL MEDICINE
Payer: COMMERCIAL

## 2018-06-29 DIAGNOSIS — I44.7 LBBB (LEFT BUNDLE BRANCH BLOCK): ICD-10-CM

## 2018-06-29 PROCEDURE — 40000264 ZZHC STATISTIC IV PUSH SINGLE INITIAL SUBSTANCE: Performed by: INTERNAL MEDICINE

## 2018-06-29 PROCEDURE — 93306 TTE W/DOPPLER COMPLETE: CPT | Performed by: INTERNAL MEDICINE

## 2018-06-29 RX ADMIN — Medication 3 ML: at 15:15

## 2018-06-29 NOTE — NURSING NOTE
Patient presents today for resting echo ordered by MD.     Echo Tech provided patient education about resting echo. IV started in RAC.   IV start documented in  [facility].  Echo technician completed resting echo. Patient monitored according to Optison protocol. Data transferred to [facility] for final interpretation through [employer].     Optison medication:  Amount used 3ml Optison mixed with 6ml Saline - [id number].  6ml Wasted.   After completion of resting echo, IV removed.    Patient education provided about cardiology interpretation and primary provider will be notified of results.    Syl Brunson RDCS

## 2018-07-02 ENCOUNTER — TELEPHONE (OUTPATIENT)
Dept: CARDIOLOGY | Facility: CLINIC | Age: 63
End: 2018-07-02

## 2018-07-02 DIAGNOSIS — I44.7 LEFT BUNDLE BRANCH BLOCK: Primary | ICD-10-CM

## 2018-07-02 NOTE — TELEPHONE ENCOUNTER
Spoke with patient regarding ECHO result. Per Dr William: Follow-up in 1 year with repeat ECHO to evaluate EF. Patient verbalized understanding and asked to call with any questions or concerns.          Ami Mccullough RN

## 2018-08-23 ENCOUNTER — TRANSFERRED RECORDS (OUTPATIENT)
Dept: HEALTH INFORMATION MANAGEMENT | Facility: CLINIC | Age: 63
End: 2018-08-23

## 2018-12-19 ENCOUNTER — MYC MEDICAL ADVICE (OUTPATIENT)
Dept: FAMILY MEDICINE | Facility: CLINIC | Age: 63
End: 2018-12-19

## 2018-12-19 NOTE — TELEPHONE ENCOUNTER
Please see my chart message.  Patient is also wondering about what kind of cold medicine he can take with elevated BP.  Loren Murry RN CPC Triage.

## 2018-12-19 NOTE — TELEPHONE ENCOUNTER
Shaquille Alvarado MD   Cp Team 2 8 minutes ago (12:54 PM)      Continue monitor blood pressure, if remain elevated, should follow up with PCP   thanks (Routing comment)

## 2018-12-19 NOTE — TELEPHONE ENCOUNTER
Shaquille Alvarado MD   Cp Team 2 3 minutes ago (2:03 PM)      May take any cough medicine over the counter with out pseudoephedrine   thanks (Routing comment)

## 2019-01-15 ENCOUNTER — TELEPHONE (OUTPATIENT)
Dept: FAMILY MEDICINE | Facility: CLINIC | Age: 64
End: 2019-01-15

## 2019-01-15 DIAGNOSIS — I10 HYPERTENSION WITH GOAL BLOOD PRESSURE LESS THAN 140/90: ICD-10-CM

## 2019-01-15 DIAGNOSIS — E78.5 HYPERLIPIDEMIA LDL GOAL <130: ICD-10-CM

## 2019-01-15 RX ORDER — SIMVASTATIN 40 MG
40 TABLET ORAL AT BEDTIME
Qty: 30 TABLET | Refills: 0 | Status: SHIPPED | OUTPATIENT
Start: 2019-01-15 | End: 2019-04-04

## 2019-01-15 RX ORDER — LISINOPRIL 20 MG/1
20 TABLET ORAL DAILY
Qty: 30 TABLET | Refills: 0 | Status: SHIPPED | OUTPATIENT
Start: 2019-01-15 | End: 2019-03-11

## 2019-01-15 RX ORDER — SIMVASTATIN 40 MG
40 TABLET ORAL AT BEDTIME
Qty: 90 TABLET | Refills: 3 | Status: SHIPPED | OUTPATIENT
Start: 2019-01-15 | End: 2019-03-11

## 2019-01-15 RX ORDER — LISINOPRIL 20 MG/1
20 TABLET ORAL DAILY
Qty: 90 TABLET | Refills: 3 | Status: SHIPPED | OUTPATIENT
Start: 2019-01-15 | End: 2019-03-11

## 2019-01-15 NOTE — TELEPHONE ENCOUNTER
Reason for Call:  Medication or medication refill:    Do you use a [facility]?  Name of the pharmacy and phone number for the current request:  [facility]    Name of the medication requested: lisinopril    Other request: Simvastatin - Patient's pharmacy changed to [facility], [address], [place] [address]; phone [phone].  Patient states that he only has 4 days left of these meds and may needs quick scripts to tie him over until he can get the delivery from [facility].  Patient didn't realize that the pharmacy changed with his new insurance.  Patient states that Dr. NAPOLES normally prescribes him a year's worth with the mail order.  Please call the patient back to discuss this and to advise.  If he can get a quick script, he would use [facility] on [address].    Can we leave a detailed message on this number? YES    Phone number patient can be reached at: Cell number on file:    Telephone Information:   Mobile [phone]       Best Time: anytime    Call taken on 1/15/2019 at 5:40 PM by Jaqui Mathis

## 2019-01-16 NOTE — TELEPHONE ENCOUNTER
Prescription approved per [facility] Refill Protocol.  Jessie Mendenhall RN    Patient also informed.  Short term refill sent to local pharmacy and new refills sent to Mail order pharmacy [facility].  Jessie Mendenhall RN

## 2019-03-09 ENCOUNTER — TRANSFERRED RECORDS (OUTPATIENT)
Dept: HEALTH INFORMATION MANAGEMENT | Facility: CLINIC | Age: 64
End: 2019-03-09

## 2019-03-09 ENCOUNTER — NURSE TRIAGE (OUTPATIENT)
Dept: NURSING | Facility: CLINIC | Age: 64
End: 2019-03-09

## 2019-03-09 NOTE — TELEPHONE ENCOUNTER
They will go to [facility].  Diamond Grady RN-[facility] Nurse Advisors      Reason for Disposition    [1] Numbness (new loss of sensation) of toe(s) AND [2] present now    Additional Information    Followed an ankle injury    Negative: Serious injury with multiple fractures    Negative: [1] Major bleeding (e.g., actively dripping or spurting) AND [2] can't be stopped    Negative: Amputation    Negative: Looks like a dislocated joint (very crooked or deformed)    Negative: Sounds like a life-threatening emergency to the triager    Negative: Wound looks infected    Negative: Caused by an animal bite    Negative: Caused by a human bite    Negative: Puncture wound of foot    Negative: Toe injury is main concern    Negative: Bullet wound, stabbed by knife, or other serious penetrating wound    Negative: Skin is split open or gaping  (or length > 1/2 inch or 12 mm)    Negative: [1] Bleeding AND [2] won't stop after 10 minutes of direct pressure (using correct technique)    Negative: [1] Dirt in the wound AND [2] not removed with 15 minutes of scrubbing    Negative: Can't stand (bear weight) or walk    Negative: Sounds like a serious injury to the triager    Protocols used: FOOT AND ANKLE INJURY-ADULT-AH, ANKLE PAIN-ADULT-AH

## 2019-03-11 ENCOUNTER — OFFICE VISIT (OUTPATIENT)
Dept: FAMILY MEDICINE | Facility: CLINIC | Age: 64
End: 2019-03-11
Payer: COMMERCIAL

## 2019-03-11 ENCOUNTER — TRANSFERRED RECORDS (OUTPATIENT)
Dept: HEALTH INFORMATION MANAGEMENT | Facility: CLINIC | Age: 64
End: 2019-03-11

## 2019-03-11 VITALS
HEART RATE: 78 BPM | OXYGEN SATURATION: 97 % | HEIGHT: 70 IN | WEIGHT: 224 LBS | TEMPERATURE: 97.8 F | BODY MASS INDEX: 32.07 KG/M2 | SYSTOLIC BLOOD PRESSURE: 142 MMHG | DIASTOLIC BLOOD PRESSURE: 86 MMHG

## 2019-03-11 DIAGNOSIS — I10 HYPERTENSION WITH GOAL BLOOD PRESSURE LESS THAN 140/90: ICD-10-CM

## 2019-03-11 DIAGNOSIS — Z01.818 PREOP GENERAL PHYSICAL EXAM: Primary | ICD-10-CM

## 2019-03-11 PROCEDURE — 93000 ELECTROCARDIOGRAM COMPLETE: CPT | Performed by: FAMILY MEDICINE

## 2019-03-11 PROCEDURE — 99214 OFFICE O/P EST MOD 30 MIN: CPT | Performed by: FAMILY MEDICINE

## 2019-03-11 RX ORDER — LISINOPRIL 20 MG/1
40 TABLET ORAL DAILY
Qty: 30 TABLET | Refills: 0 | COMMUNITY
Start: 2019-03-11 | End: 2019-04-04

## 2019-03-11 ASSESSMENT — MIFFLIN-ST. JEOR: SCORE: 1827.31

## 2019-03-11 NOTE — PROGRESS NOTES
[address] [address] [phone]  Dept: [phone]    PRE-OP EVALUATION:  Today's date: 3/11/2019    Mitchell Randolph (: 1955) presents for pre-operative evaluation assessment as requested by Dr Avelar.  He requires evaluation and anesthesia risk assessment prior to undergoing surgery/procedure for treatment of ORIF Right Ankle .    Fax number for surgical facility: [phone]  Primary Physician: Todd Oh  Type of Anesthesia Anticipated: to be determined    Patient has a Health Care Directive or Living Will:  NO    Preop Questions 3/11/2019   Who is doing your surgery? Cholo Avelar   What are you having done? ORIF Right Ankle   Date of Surgery/Procedure: 3/12/19   Facility or Hospital where procedure/surgery [facility] be performed[facility]   1.  Do you have a history of Heart attack, stroke, stent, coronary bypass surgery, or other heart surgery? No   2.  Do you ever have any pain or discomfort in your chest? No   3.  Do you have a history of  Heart Failure? No   4.   Are you troubled by shortness of breath when:  walking on a level surface, or up a slight hill, or at night? No   5.  Do you currently have a cold, bronchitis or other respiratory infection? No   6.  Do you have a cough, shortness of breath, or wheezing? No   7.  Do you sometimes get pains in the calves of your legs when you walk? No   8. Do you or anyone in your family have previous history of blood clots? No   9.  Do you or does anyone in your family have a serious bleeding problem such as prolonged bleeding following surgeries or cuts? No   10. Have you ever had problems with anemia or been told to take iron pills? No   11. Have you had any abnormal blood loss such as black, tarry or bloody stools? No   12. Have you ever had a blood transfusion? No   13. Have you or any of your relatives ever had problems with anesthesia? No   14.  Do you have sleep apnea, excessive snoring or daytime drowsiness? UNKNOWN - wife says he snores    15. Do you have any prosthetic heart valves? No   16. Do you have prosthetic joints? No         HPI:     HPI related to upcoming procedure: fell 2 days ago.   He has a new fracture of distal 1/3 of tibia     Patient has documented LBBB with normal echo other than the LBBB. Ejection fraction is 50-55 %    MEDICAL HISTORY:     Patient Active Problem List    Diagnosis Date Noted     Family history of prostate cancer in father 03/27/2017     Priority: Medium     Hypertension with goal blood pressure less than 140/90 01/06/2017     Priority: Medium     Advanced directives, counseling/discussion 04/02/2013     Priority: Medium     Advance Care Planning:   ACP Review and Resources Provided:  Reviewed chart for advance care plan.  Mitchell BAKER Wiayse has no plan or code status on file. Discussed available resources and provided with information. Confirmed code status reflects current choices pending further ACP discussions.  Confirmed/documented designated decision maker(s). See permanent comments section of demographics in clinical tab.   Added by Chelsey Zamudio on 4/2/2013             Tinea corporis 03/01/2011     Priority: Medium     HYPERLIPIDEMIA LDL GOAL <130 10/31/2010     Priority: Medium     Slow urinary stream 02/24/2010     Priority: Medium      Past Medical History:   Diagnosis Date     High cholesterol      No past surgical history on file.  Current Outpatient Medications   Medication Sig Dispense Refill     cetirizine (ZYRTEC) 10 MG tablet Take 1 tablet (10 mg) by mouth every evening (Patient not taking: Reported on 5/7/2018) 30 tablet 1     fluconazole (DIFLUCAN) 200 MG tablet Take 1 tablet (200 mg) by mouth daily 2 tablets as needed, repeat 2 tablets in 2 weeks (Patient not taking: Reported on 4/8/2018) 4 tablet 6     fluticasone (FLONASE) 50 MCG/ACT spray Spray 1-2 sprays into both nostrils daily (Patient not taking:  "Reported on 5/7/2018) 16 g 0     lisinopril (PRINIVIL/ZESTRIL) 20 MG tablet Take 1 tablet (20 mg) by mouth daily 90 tablet 3     lisinopril (PRINIVIL/ZESTRIL) 20 MG tablet Take 1 tablet (20 mg) by mouth daily 30 tablet 0     simvastatin (ZOCOR) 40 MG tablet Take 1 tablet (40 mg) by mouth At Bedtime Due for recheck in March 2017 30 tablet 0     simvastatin (ZOCOR) 40 MG tablet Take 1 tablet (40 mg) by mouth At Bedtime 90 tablet 3     OTC products: None, except as noted above    No Known Allergies   Latex Allergy: NO    Social History     Tobacco Use     Smoking status: Never Smoker     Smokeless tobacco: Never Used   Substance Use Topics     Alcohol use: Yes     Comment: 2 beers per week     History   Drug Use No       REVIEW OF SYSTEMS:   CONSTITUTIONAL: NEGATIVE for fever, chills, change in weight  INTEGUMENTARY/SKIN: NEGATIVE for worrisome rashes, moles or lesions  EYES: NEGATIVE for vision changes or irritation  ENT/MOUTH: NEGATIVE for ear, mouth and throat problems  RESP: NEGATIVE for significant cough or SOB  BREAST: NEGATIVE for masses, tenderness or discharge  CV: NEGATIVE for chest pain, palpitations or peripheral edema  GI: NEGATIVE for nausea, abdominal pain, heartburn, or change in bowel habits  : NEGATIVE for frequency, dysuria, or hematuria  MUSCULOSKELETAL: NEGATIVE for significant arthralgias or myalgia  NEURO: NEGATIVE for weakness, dizziness or paresthesias  ENDOCRINE: NEGATIVE for temperature intolerance, skin/hair changes  HEME: NEGATIVE for bleeding problems  PSYCHIATRIC: NEGATIVE for changes in mood or affect    EXAM:   /86 (BP Location: Left arm, Patient Position: Sitting, Cuff Size: Adult Large)   Pulse 78   Temp 97.8  F (36.6  C) (Oral)   Ht 1.778 m (5' 10\")   Wt 101.6 kg (224 lb)   SpO2 97%   BMI 32.14 kg/m      GENERAL APPEARANCE: healthy, alert and no distress     EYES: EOMI,  PERRL     HENT: ear canals and TM's normal and nose and mouth without ulcers or lesions     NECK: no " adenopathy, no asymmetry, masses, or scars and thyroid normal to palpation     RESP: lungs clear to auscultation - no rales, rhonchi or wheezes     CV: regular rates and rhythm, normal S1 S2, no S3 or S4 and no murmur, click or rub     ABDOMEN:  soft, nontender, no HSM or masses and bowel sounds normal     MS: extremities normal- no gross deformities noted, no evidence of inflammation in joints, FROM in all extremities.     SKIN: no suspicious lesions or rashes     NEURO: Normal strength and tone, sensory exam grossly normal, mentation intact and speech normal     PSYCH: mentation appears normal. and affect normal/bright     LYMPHATICS: No cervical adenopathy    DIAGNOSTICS:   EKG: appears normal, NSR, normal axis, normal intervals, no acute ST/T changes c/w ischemia, no LVH by voltage criteria, Left Bundle Branch Block, unchanged from previous tracings    Recent Labs   Lab Test 04/02/18  0833 03/27/17  0830 03/24/16  0745 03/19/15  0736 04/03/14  0740  04/02/13  0902   HGB  --   --  16.4 16.4 16.1   < >  --    PLT  --   --  203 184 182   < >  --     144 143 139 142   < >  --    POTASSIUM 4.3 4.4 4.3 4.3 4.5   < >  --    CR 1.01 1.02 0.94 0.98 1.07   < >  --    A1C  --   --   --   --  5.6  --  5.7    < > = values in this interval not displayed.        IMPRESSION:   Reason for surgery/procedure: fracture fibula right   Diagnosis/reason for consult: fracture fibula     The proposed surgical procedure is considered INTERMEDIATE risk.    REVISED CARDIAC RISK INDEX  The patient has the following serious cardiovascular risks for perioperative complications such as (MI, PE, VFib and 3  AV Block):  No serious cardiac risks  INTERPRETATION: 0 risks: Class I (very low risk - 0.4% complication rate)    The patient has the following additional risks for perioperative complications:  No identified additional risks      ICD-10-CM    1. Preop general physical exam Z01.818    2. Hypertension with goal blood pressure less than  140/90 I10 lisinopril (PRINIVIL/ZESTRIL) 20 MG tablet       RECOMMENDATIONS:       I spoke with Dr Stone a cardiologist at the [facility].  Patient has normal cardiac function, normal ejection fraction and other than LBBB ho cardiac abnormality on either echo or EKG.  He feels he is ok for surgery.     --Patient is to take all scheduled medications on the day of surgery EXCEPT for modifications listed below.    APPROVAL GIVEN to proceed with proposed procedure, without further diagnostic evaluation       Signed Electronically by: Todd Oh MD    Copy of this evaluation report is provided to requesting physician.    [facility] Preop Guidelines    Revised Cardiac Risk Index

## 2019-03-14 ENCOUNTER — MYC MEDICAL ADVICE (OUTPATIENT)
Dept: FAMILY MEDICINE | Facility: CLINIC | Age: 64
End: 2019-03-14

## 2019-03-14 NOTE — TELEPHONE ENCOUNTER
My chart message sent to patient with the information below per provider.  Loren Murry RN [facility] Triage.

## 2019-03-21 ENCOUNTER — TRANSFERRED RECORDS (OUTPATIENT)
Dept: HEALTH INFORMATION MANAGEMENT | Facility: CLINIC | Age: 64
End: 2019-03-21

## 2019-04-01 ASSESSMENT — ENCOUNTER SYMPTOMS
DYSURIA: 0
HEMATOCHEZIA: 0
CONSTIPATION: 0
SHORTNESS OF BREATH: 0
HEADACHES: 0
COUGH: 0
FEVER: 0
FREQUENCY: 0
NAUSEA: 0
ABDOMINAL PAIN: 0
EYE PAIN: 0
MYALGIAS: 0
DIARRHEA: 0
HEMATURIA: 0
HEARTBURN: 0
SORE THROAT: 0
DIZZINESS: 0
ARTHRALGIAS: 0
CHILLS: 0
JOINT SWELLING: 0
PALPITATIONS: 0
WEAKNESS: 0
PARESTHESIAS: 0
NERVOUS/ANXIOUS: 0

## 2019-04-04 ENCOUNTER — OFFICE VISIT (OUTPATIENT)
Dept: FAMILY MEDICINE | Facility: CLINIC | Age: 64
End: 2019-04-04
Payer: COMMERCIAL

## 2019-04-04 ENCOUNTER — TELEPHONE (OUTPATIENT)
Dept: FAMILY MEDICINE | Facility: CLINIC | Age: 64
End: 2019-04-04

## 2019-04-04 VITALS
HEART RATE: 80 BPM | DIASTOLIC BLOOD PRESSURE: 88 MMHG | SYSTOLIC BLOOD PRESSURE: 129 MMHG | HEIGHT: 70 IN | TEMPERATURE: 97.8 F | BODY MASS INDEX: 32.07 KG/M2 | WEIGHT: 224 LBS | OXYGEN SATURATION: 96 %

## 2019-04-04 DIAGNOSIS — I10 HYPERTENSION WITH GOAL BLOOD PRESSURE LESS THAN 140/90: ICD-10-CM

## 2019-04-04 DIAGNOSIS — E78.5 HYPERLIPIDEMIA LDL GOAL <100: ICD-10-CM

## 2019-04-04 DIAGNOSIS — B35.4 TINEA CORPORIS: ICD-10-CM

## 2019-04-04 DIAGNOSIS — E78.5 HYPERLIPIDEMIA LDL GOAL <130: ICD-10-CM

## 2019-04-04 DIAGNOSIS — I10 HYPERTENSION GOAL BP (BLOOD PRESSURE) < 140/90: ICD-10-CM

## 2019-04-04 DIAGNOSIS — Z80.42 FAMILY HISTORY OF PROSTATE CANCER IN FATHER: ICD-10-CM

## 2019-04-04 DIAGNOSIS — Z00.00 PREVENTATIVE HEALTH CARE: Primary | ICD-10-CM

## 2019-04-04 DIAGNOSIS — R39.198 SLOW URINARY STREAM: ICD-10-CM

## 2019-04-04 DIAGNOSIS — E66.811 CLASS 1 OBESITY DUE TO EXCESS CALORIES WITHOUT SERIOUS COMORBIDITY WITH BODY MASS INDEX (BMI) OF 33.0 TO 33.9 IN ADULT: ICD-10-CM

## 2019-04-04 DIAGNOSIS — E66.09 CLASS 1 OBESITY DUE TO EXCESS CALORIES WITHOUT SERIOUS COMORBIDITY WITH BODY MASS INDEX (BMI) OF 33.0 TO 33.9 IN ADULT: ICD-10-CM

## 2019-04-04 LAB
ANION GAP SERPL CALCULATED.3IONS-SCNC: 5 MMOL/L (ref 3–14)
AST SERPL W P-5'-P-CCNC: 15 U/L (ref 0–45)
BUN SERPL-MCNC: 22 MG/DL (ref 7–30)
CALCIUM SERPL-MCNC: 9.1 MG/DL (ref 8.5–10.1)
CHLORIDE SERPL-SCNC: 108 MMOL/L (ref 94–109)
CHOLEST SERPL-MCNC: 160 MG/DL
CK SERPL-CCNC: 147 U/L (ref 30–300)
CO2 SERPL-SCNC: 29 MMOL/L (ref 20–32)
CREAT SERPL-MCNC: 0.95 MG/DL (ref 0.66–1.25)
GFR SERPL CREATININE-BSD FRML MDRD: 86 ML/MIN/{1.73_M2}
GLUCOSE SERPL-MCNC: 99 MG/DL (ref 70–99)
HDLC SERPL-MCNC: 43 MG/DL
LDLC SERPL CALC-MCNC: 92 MG/DL
NONHDLC SERPL-MCNC: 117 MG/DL
POTASSIUM SERPL-SCNC: 4.1 MMOL/L (ref 3.4–5.3)
PSA SERPL-ACNC: 1.04 UG/L (ref 0–4)
SODIUM SERPL-SCNC: 142 MMOL/L (ref 133–144)
TRIGL SERPL-MCNC: 124 MG/DL

## 2019-04-04 PROCEDURE — 99396 PREV VISIT EST AGE 40-64: CPT | Performed by: FAMILY MEDICINE

## 2019-04-04 PROCEDURE — 80061 LIPID PANEL: CPT | Performed by: FAMILY MEDICINE

## 2019-04-04 PROCEDURE — 84153 ASSAY OF PSA TOTAL: CPT | Performed by: FAMILY MEDICINE

## 2019-04-04 PROCEDURE — 84450 TRANSFERASE (AST) (SGOT): CPT | Performed by: FAMILY MEDICINE

## 2019-04-04 PROCEDURE — 80048 BASIC METABOLIC PNL TOTAL CA: CPT | Performed by: FAMILY MEDICINE

## 2019-04-04 PROCEDURE — 36415 COLL VENOUS BLD VENIPUNCTURE: CPT | Performed by: FAMILY MEDICINE

## 2019-04-04 PROCEDURE — 82550 ASSAY OF CK (CPK): CPT | Performed by: FAMILY MEDICINE

## 2019-04-04 RX ORDER — FLUCONAZOLE 200 MG/1
TABLET ORAL
Qty: 4 TABLET | Refills: 6 | Status: SHIPPED | OUTPATIENT
Start: 2019-04-04 | End: 2019-09-12

## 2019-04-04 RX ORDER — FLUCONAZOLE 200 MG/1
200 TABLET ORAL DAILY
Qty: 4 TABLET | Refills: 6 | Status: SHIPPED | OUTPATIENT
Start: 2019-04-04 | End: 2019-04-04

## 2019-04-04 RX ORDER — SIMVASTATIN 40 MG
40 TABLET ORAL AT BEDTIME
Qty: 90 TABLET | Refills: 3 | Status: SHIPPED | OUTPATIENT
Start: 2019-04-04 | End: 2020-03-18

## 2019-04-04 RX ORDER — LISINOPRIL 20 MG/1
40 TABLET ORAL DAILY
Qty: 90 TABLET | Refills: 3 | Status: SHIPPED | OUTPATIENT
Start: 2019-04-04 | End: 2019-09-12

## 2019-04-04 ASSESSMENT — ENCOUNTER SYMPTOMS
MYALGIAS: 0
NERVOUS/ANXIOUS: 0
PARESTHESIAS: 0
CHILLS: 0
PALPITATIONS: 0
ABDOMINAL PAIN: 0
COUGH: 0
SORE THROAT: 0
FREQUENCY: 0
HEMATOCHEZIA: 0
CONSTIPATION: 0
DIZZINESS: 0
DYSURIA: 0
HEARTBURN: 0
FEVER: 0
ARTHRALGIAS: 0
JOINT SWELLING: 0
HEMATURIA: 0
HEADACHES: 0
DIARRHEA: 0
SHORTNESS OF BREATH: 0
WEAKNESS: 0
EYE PAIN: 0
NAUSEA: 0

## 2019-04-04 ASSESSMENT — MIFFLIN-ST. JEOR: SCORE: 1827.31

## 2019-04-04 NOTE — TELEPHONE ENCOUNTER
Reason for Call:  Other: prescription clarification    Detailed comments: Please clarify directions for   fluconazole (DIFLUCAN) 200 MG tablet 4 tablet 6 4/4/2019  No   Sig - Route: Take 1 tablet (200 mg) by mouth daily 2 tablets as needed, repeat 2 tablets in 2 weeks - Oral   Sent to pharmacy as: fluconazole (DIFLUCAN) 200 MG tablet   Class: E-Prescribe   Order: [id number]   E-Prescribing Status: Receipt confirmed by pharmacy (4/4/2019  9:22 AM CDT)         Phone Number Patient can be reached at: Other phone number:  [phone]    Best Time: any    Can we leave a detailed message on this number? YES    Call taken on 4/4/2019 at 9:43 AM by Ghazala Gupta

## 2019-04-04 NOTE — PROGRESS NOTES
SUBJECTIVE:   CC: Mitchell Randolph is an 63 year old male who presents for preventative health visit.     Physical   Annual:     Getting at least 3 servings of Calcium per day:  Yes    Bi-annual eye exam:  Yes    Dental care twice a year:  Yes    Sleep apnea or symptoms of sleep apnea:  None    Diet:  Regular (no restrictions) and Low fat/cholesterol    Frequency of exercise:  1 day/week    Duration of exercise:  Less than 15 minutes    Taking medications regularly:  Yes    Medication side effects:  None    Additional concerns today:  No    PHQ-2 Total Score: 0      Patient has a fractured fibula and has just been plated     Patient has tinea corporis   He uses diflucan for this prn     Today's PHQ-2 Score:   PHQ-2 ( 1999 [employer]) 4/1/2019   Q1: Little interest or pleasure in doing things 0   Q2: Feeling down, depressed or hopeless 0   PHQ-2 Score 0   Q1: Little interest or pleasure in doing things Not at all   Q2: Feeling down, depressed or hopeless Not at all   PHQ-2 Score 0       Abuse: Current or Past(Physical, Sexual or Emotional)- No  Do you feel safe in your environment? Yes    Social History     Tobacco Use     Smoking status: Never Smoker     Smokeless tobacco: Never Used   Substance Use Topics     Alcohol use: Yes     Comment: 2 beers per week     Alcohol Use 4/1/2019   If you drink alcohol do you typically have greater than 3 drinks per day OR greater than 7 drinks per week? No       Last PSA:   PSA   Date Value Ref Range Status   04/02/2018 1.00 0 - 4 ug/L Final     Comment:     Assay Method:  Chemiluminescence using Siemens Vista analyzer       Reviewed orders with patient. Reviewed health maintenance and updated orders accordingly - Yes  BP Readings from Last 3 Encounters:   04/04/19 129/88   03/11/19 142/86   06/25/18 144/84    Wt Readings from Last 3 Encounters:   04/04/19 101.6 kg (224 lb)   03/11/19 101.6 kg (224 lb)   06/25/18 100.7 kg (222 lb)                  Patient Active Problem List   Diagnosis      Slow urinary stream     HYPERLIPIDEMIA LDL GOAL <130     Tinea corporis     Advanced directives, counseling/discussion     Hypertension with goal blood pressure less than 140/90     Family history of prostate cancer in father     Past Surgical History:   Procedure Laterality Date     ORTHOPEDIC SURGERY  Mar 2019       Social History     Tobacco Use     Smoking status: Never Smoker     Smokeless tobacco: Never Used   Substance Use Topics     Alcohol use: Yes     Comment: 2 beers per week     Family History   Problem Relation Age of Onset     Cancer Maternal Grandfather         bone         Current Outpatient Medications   Medication Sig Dispense Refill     fluconazole (DIFLUCAN) 200 MG tablet Take 1 tablet (200 mg) by mouth daily 2 tablets as needed, repeat 2 tablets in 2 weeks 4 tablet 6     lisinopril (PRINIVIL/ZESTRIL) 20 MG tablet Take 2 tablets (40 mg) by mouth daily 30 tablet 0     simvastatin (ZOCOR) 40 MG tablet Take 1 tablet (40 mg) by mouth At Bedtime Due for recheck in March 2017 30 tablet 0     No Known Allergies    Reviewed and updated as needed this visit by clinical staff  Tobacco  Allergies  Meds  Med Hx  Surg Hx  Fam Hx  Soc Hx        Reviewed and updated as needed this visit by Provider        Past Medical History:   Diagnosis Date     High cholesterol      Hypertension       Past Surgical History:   Procedure Laterality Date     ORTHOPEDIC SURGERY  Mar 2019       Review of Systems   Constitutional: Negative for chills and fever.   HENT: Negative for congestion, ear pain, hearing loss and sore throat.    Eyes: Negative for pain and visual disturbance.   Respiratory: Negative for cough and shortness of breath.    Cardiovascular: Negative for chest pain, palpitations and peripheral edema.   Gastrointestinal: Negative for abdominal pain, constipation, diarrhea, heartburn, hematochezia and nausea.   Genitourinary: Negative for discharge, dysuria, frequency, genital sores, hematuria, impotence  "and urgency.   Musculoskeletal: Negative for arthralgias, joint swelling and myalgias.   Skin: Negative for rash.   Neurological: Negative for dizziness, weakness, headaches and paresthesias.   Psychiatric/Behavioral: Negative for mood changes. The patient is not nervous/anxious.      OBJECTIVE:   /88 (BP Location: Left arm, Patient Position: Chair, Cuff Size: Adult Regular)   Pulse 80   Temp 97.8  F (36.6  C) (Oral)   Ht 1.778 m (5' 10\")   Wt 101.6 kg (224 lb)   SpO2 96%   BMI 32.14 kg/m      Physical Exam  GENERAL: healthy, alert and no distress; Overweight   EYES: Eyes grossly normal to inspection, PERRL and conjunctivae and sclerae normal  HENT: ear canals and TM's normal, nose and mouth without ulcers or lesions  NECK: no adenopathy, no asymmetry, masses, or scars and thyroid normal to palpation  RESP: lungs clear to auscultation - no rales, rhonchi or wheezes  CV: regular rate and rhythm, normal S1 S2, no S3 or S4, no murmur, click or rub, no peripheral edema and peripheral pulses strong  ABDOMEN: soft, nontender, no hepatosplenomegaly, no masses and bowel sounds normal  MS: no gross musculoskeletal defects noted, no edema  SKIN: no suspicious lesions or rashes  NEURO: Normal strength and tone, mentation intact and speech normal  PSYCH: mentation appears normal, affect normal/bright      ASSESSMENT/PLAN:       ICD-10-CM    1. Preventative health care Z00.00    2. Hypertension with goal blood pressure less than 140/90 I10    3. Family history of prostate cancer in father Z80.42 PSA, screen   4. Slow urinary stream R39.198 PSA, screen   5. Hyperlipidemia LDL goal <100 E78.5 Lipid panel reflex to direct LDL Fasting     AST     CK total   6. Hypertension goal BP (blood pressure) < 140/90 I10 Basic metabolic panel   7. Class 1 obesity due to excess calories without serious comorbidity with body mass index (BMI) of 33.0 to 33.9 in adult E66.09     Z68.33      discussed weight and methods to lose " "  Discussed myplate     Patient declines screening exam for hepatitis C and HIV.  He does not think he is at high risk.  We did talk about hepatitis C being age distribution screening exam but patient still not want to have this done  Patient declined shingles vaccine saying he is currently on crutches and does not want to have any pain related to getting the shingles vaccine.  He states he had a history of shingles himself in the past.      COUNSELING:   Reviewed preventive health counseling, as reflected in patient instructions       Regular exercise       Healthy diet/nutrition       Prostate cancer screening    BP Readings from Last 1 Encounters:   04/04/19 129/88     Estimated body mass index is 32.14 kg/m  as calculated from the following:    Height as of this encounter: 1.778 m (5' 10\").    Weight as of this encounter: 101.6 kg (224 lb).       Patient has a bowl of cereal   He has beer   He thinks he has gained weight due to lack of exercise        reports that  has never smoked. he has never used smokeless tobacco.      Counseling Resources:  ATP IV Guidelines  Pooled Cohorts Equation Calculator  FRAX Risk Assessment  ICSI Preventive Guidelines  Dietary Guidelines for Americans, 2010  [employer]'s MyPlate  ASA Prophylaxis  Lung CA Screening    Todd Oh MD  [facility]  "

## 2019-04-04 NOTE — TELEPHONE ENCOUNTER
Routed to provider. Please see message below.   One tablet daily for two days? One tablet BID?   Thank you.     Lidya Castillo RN

## 2019-04-17 ENCOUNTER — TRANSFERRED RECORDS (OUTPATIENT)
Dept: HEALTH INFORMATION MANAGEMENT | Facility: CLINIC | Age: 64
End: 2019-04-17

## 2019-05-15 ENCOUNTER — TRANSFERRED RECORDS (OUTPATIENT)
Dept: HEALTH INFORMATION MANAGEMENT | Facility: CLINIC | Age: 64
End: 2019-05-15

## 2019-06-12 ENCOUNTER — TRANSFERRED RECORDS (OUTPATIENT)
Dept: HEALTH INFORMATION MANAGEMENT | Facility: CLINIC | Age: 64
End: 2019-06-12

## 2019-06-20 ENCOUNTER — OFFICE VISIT (OUTPATIENT)
Dept: FAMILY MEDICINE | Facility: CLINIC | Age: 64
End: 2019-06-20
Payer: COMMERCIAL

## 2019-06-20 VITALS
WEIGHT: 221.38 LBS | BODY MASS INDEX: 31.76 KG/M2 | TEMPERATURE: 99.1 F | SYSTOLIC BLOOD PRESSURE: 146 MMHG | DIASTOLIC BLOOD PRESSURE: 85 MMHG | HEART RATE: 75 BPM

## 2019-06-20 DIAGNOSIS — Z01.818 PREOP GENERAL PHYSICAL EXAM: Primary | ICD-10-CM

## 2019-06-20 DIAGNOSIS — M25.571 RIGHT ANKLE PAIN, UNSPECIFIED CHRONICITY: ICD-10-CM

## 2019-06-20 LAB
BASOPHILS # BLD AUTO: 0 10E9/L (ref 0–0.2)
BASOPHILS NFR BLD AUTO: 0.3 %
CREAT SERPL-MCNC: 1.02 MG/DL (ref 0.66–1.25)
DIFFERENTIAL METHOD BLD: NORMAL
EOSINOPHIL # BLD AUTO: 0.1 10E9/L (ref 0–0.7)
EOSINOPHIL NFR BLD AUTO: 1.5 %
ERYTHROCYTE [DISTWIDTH] IN BLOOD BY AUTOMATED COUNT: 13.8 % (ref 10–15)
GFR SERPL CREATININE-BSD FRML MDRD: 79 ML/MIN/{1.73_M2}
HCT VFR BLD AUTO: 47.4 % (ref 40–53)
HGB BLD-MCNC: 15.7 G/DL (ref 13.3–17.7)
LYMPHOCYTES # BLD AUTO: 1.2 10E9/L (ref 0.8–5.3)
LYMPHOCYTES NFR BLD AUTO: 18.8 %
MCH RBC QN AUTO: 29.5 PG (ref 26.5–33)
MCHC RBC AUTO-ENTMCNC: 33.1 G/DL (ref 31.5–36.5)
MCV RBC AUTO: 89 FL (ref 78–100)
MONOCYTES # BLD AUTO: 0.5 10E9/L (ref 0–1.3)
MONOCYTES NFR BLD AUTO: 8.7 %
NEUTROPHILS # BLD AUTO: 4.4 10E9/L (ref 1.6–8.3)
NEUTROPHILS NFR BLD AUTO: 70.7 %
PLATELET # BLD AUTO: 210 10E9/L (ref 150–450)
POTASSIUM SERPL-SCNC: 4.3 MMOL/L (ref 3.4–5.3)
RBC # BLD AUTO: 5.32 10E12/L (ref 4.4–5.9)
WBC # BLD AUTO: 6.2 10E9/L (ref 4–11)

## 2019-06-20 PROCEDURE — 82565 ASSAY OF CREATININE: CPT | Performed by: FAMILY MEDICINE

## 2019-06-20 PROCEDURE — 36415 COLL VENOUS BLD VENIPUNCTURE: CPT | Performed by: FAMILY MEDICINE

## 2019-06-20 PROCEDURE — 99214 OFFICE O/P EST MOD 30 MIN: CPT | Performed by: FAMILY MEDICINE

## 2019-06-20 PROCEDURE — 85025 COMPLETE CBC W/AUTO DIFF WBC: CPT | Performed by: FAMILY MEDICINE

## 2019-06-20 PROCEDURE — 84132 ASSAY OF SERUM POTASSIUM: CPT | Performed by: FAMILY MEDICINE

## 2019-06-20 ASSESSMENT — PAIN SCALES - GENERAL: PAINLEVEL: NO PAIN (0)

## 2019-06-20 NOTE — PROGRESS NOTES
[address] [address] [phone]  Dept: [phone]    PRE-OP EVALUATION:  Today's date: 2019    Mitchell Randolph (: 1955) presents for pre-operative evaluation assessment as requested by Dr. Avelar.  He requires evaluation and anesthesia risk assessment prior to undergoing surgery/procedure for treatment of screw removal from ankle .    Fax number for surgical facility: [phone]  Primary Physician: Todd Oh  Type of Anesthesia Anticipated: General    Patient has a Health Care Directive or Living Will:  YES     Preop Questions 2019   Who is doing your surgery? Dr. EMILY Avelar   What are you having done? remove screw from ankle   Date of Surgery/Procedure:    Facility or Hospital where procedure/surgery will be performed: [facility]   1.  Do you have a history of Heart attack, stroke, stent, coronary bypass surgery, or other heart surgery? No   2.  Do you ever have any pain or discomfort in your chest? No   3.  Do you have a history of  Heart Failure? No   4.   Are you troubled by shortness of breath when:  walking on a level surface, or up a slight hill, or at night? No   5.  Do you currently have a cold, bronchitis or other respiratory infection? No   6.  Do you have a cough, shortness of breath, or wheezing? No   7.  Do you sometimes get pains in the calves of your legs when you walk? No   8. Do you or anyone in your family have previous history of blood clots? No   9.  Do you or does anyone in your family have a serious bleeding problem such as prolonged bleeding following surgeries or cuts? No   10. Have you ever had problems with anemia or been told to take iron pills? No   11. Have you had any abnormal blood loss such as black, tarry or bloody stools? No   12. Have you ever had a blood transfusion? No   13. Have you or any of your relatives ever had problems with anesthesia? No   14. Do you have  sleep apnea, excessive snoring or daytime drowsiness? No   15. Do you have any prosthetic heart valves? No   16. Do you have prosthetic joints? No         HPI:     HPI related to upcoming procedure: 63 year old male to have hardware removal right ankle. Had orif after fracture March this year.            MEDICAL HISTORY:     Patient Active Problem List    Diagnosis Date Noted     Family history of prostate cancer in father 03/27/2017     Priority: Medium     Hypertension with goal blood pressure less than 140/90 01/06/2017     Priority: Medium     Advanced directives, counseling/discussion 04/02/2013     Priority: Medium     Advance Care Planning:   ACP Review and Resources Provided:  Reviewed chart for advance care plan.  Mitchell Randolph has no plan or code status on file. Discussed available resources and provided with information. Confirmed code status reflects current choices pending further ACP discussions.  Confirmed/documented designated decision maker(s). See permanent comments section of demographics in clinical tab.   Added by Chelsey Zamudio on 4/2/2013             Tinea corporis 03/01/2011     Priority: Medium     HYPERLIPIDEMIA LDL GOAL <130 10/31/2010     Priority: Medium     Slow urinary stream 02/24/2010     Priority: Medium      Past Medical History:   Diagnosis Date     High cholesterol      Hypertension      Past Surgical History:   Procedure Laterality Date     ORTHOPEDIC SURGERY  Mar 2019   tonsillectomy also      Current Outpatient Medications   Medication Sig Dispense Refill     fluconazole (DIFLUCAN) 200 MG tablet 2 tablets as needed, repeat 2 tablets in 2 weeks if this is started 4 tablet 6     lisinopril (PRINIVIL/ZESTRIL) 20 MG tablet Take 2 tablets (40 mg) by mouth daily 90 tablet 3     simvastatin (ZOCOR) 40 MG tablet Take 1 tablet (40 mg) by mouth At Bedtime Due for recheck in March 2017 90 tablet 3   patient not currently on the fluconazole      OTC products: None, except as noted  above    No Known Allergies   Latex Allergy: NO    Social History     Tobacco Use     Smoking status: Never Smoker     Smokeless tobacco: Never Used   Substance Use Topics     Alcohol use: Yes     Comment: 2 beers per week     History   Drug Use No       REVIEW OF SYSTEMS:   Constitutional, neuro, ENT, endocrine, pulmonary, cardiac, gastrointestinal, genitourinary, musculoskeletal, integument and psychiatric systems are negative, except as otherwise noted.    No recent illnesses        EXAM:   /82 (BP Location: Right arm, Patient Position: Chair, Cuff Size: Adult Regular)   Pulse 75   Temp 99.1  F (37.3  C) (Oral)   Wt 100.4 kg (221 lb 6 oz)   BMI 31.76 kg/m      GENERAL APPEARANCE: healthy, alert and no distress     EYES: EOMI,  PERRL     HENT: ear canals and TM's normal and nose and mouth without ulcers or lesions     NECK: no adenopathy, no asymmetry, masses, or scars and thyroid normal to palpation     RESP: lungs clear to auscultation - no rales, rhonchi or wheezes     CV: regular rates and rhythm, normal S1 S2, no S3 or S4 and no murmur, click or rub     ABDOMEN:  soft, nontender, no HSM or masses and bowel sounds normal     MS: extremities normal- no gross deformities noted, no evidence of inflammation in joints, FROM in all extremities.     SKIN: no suspicious lesions or rashes     NEURO: Normal strength and tone, sensory exam grossly normal, mentation intact and speech normal     PSYCH: mentation appears normal. and affect normal/bright     LYMPHATICS: No cervical adenopathy    DIAGNOSTICS:   See ekg from earlier this year ( known history of LBBB ) and also echo report from a year ago    Recent Labs   Lab Test 04/04/19  0929 04/02/18  0833  03/24/16  0745 03/19/15  0736 04/03/14  0740  04/02/13  0902   HGB  --   --   --  16.4 16.4 16.1   < >  --    PLT  --   --   --  203 184 182   < >  --     141   < > 143 139 142   < >  --    POTASSIUM 4.1 4.3   < > 4.3 4.3 4.5   < >  --    CR 0.95 1.01   <  > 0.94 0.98 1.07   < >  --    A1C  --   --   --   --   --  5.6  --  5.7    < > = values in this interval not displayed.      Potassium, creatinine, and cbc drawn today       IMPRESSION:   Reason for surgery/procedure: patient to have some hardware removed right ankle; had orif in March of this year  Diagnosis/reason for consult: preop clearance    The proposed surgical procedure is considered LOW risk.    REVISED CARDIAC RISK INDEX  The patient has the following serious cardiovascular risks for perioperative complications such as (MI, PE, VFib and 3  AV Block):  No serious cardiac risks  INTERPRETATION: 0 risks: Class I (very low risk - 0.4% complication rate)    The patient has the following additional risks for perioperative complications:  No identified additional risks      ICD-10-CM    1. Preop general physical exam Z01.818        RECOMMENDATIONS:          --Patient is to take all scheduled medications on the day of surgery EXCEPT for modifications listed below.    Take blood pressure med before procedure    Not on any blood thinners    Known history of the left bundle branch block, but no cardiac type symptoms.     No other heart history    No bleeding or clotting problems    No history of  Anesthesia problems    Blood pressure mildly high here    APPROVAL GIVEN to proceed with proposed procedure, without further diagnostic evaluation, providing labs are okay       Signed Electronically by: Kevon Gaona MD    Copy of this evaluation report is provided to requesting physician.    [facility] Preop Guidelines    Revised Cardiac Risk Index

## 2019-06-20 NOTE — PATIENT INSTRUCTIONS
Before Your Surgery      Call your surgeon if there is any change in your health. This includes signs of a cold or flu (such as a sore throat, runny nose, cough, rash or fever).    Do not smoke, drink alcohol or take over the counter medicine (unless your surgeon or primary care doctor tells you to) for the 24 hours before and after surgery.    If you take prescribed drugs: Follow your doctor s orders about which medicines to take and which to stop until after surgery.    Eating and drinking prior to surgery: follow the instructions from your surgeon    Take a shower or bath the night before surgery. Use the soap your surgeon gave you to gently clean your skin. If you do not have soap from your surgeon, use your regular soap. Do not shave or scrub the surgery site.  Wear clean pajamas and have clean sheets on your bed.           We will send you lab results    Take blood pressure med before surgery     No ibuprofen/ naproxen / aspirin from now until after procedure

## 2019-06-23 NOTE — RESULT ENCOUNTER NOTE
Your pre-op labs are fine.    Kevon Gaona MD    [facility] - please fax to [facility] preop  Thanks  Kevon Gaona MD

## 2019-07-01 ENCOUNTER — ANCILLARY PROCEDURE (OUTPATIENT)
Dept: CARDIOLOGY | Facility: CLINIC | Age: 64
End: 2019-07-01
Attending: INTERNAL MEDICINE
Payer: COMMERCIAL

## 2019-07-01 DIAGNOSIS — I44.7 LEFT BUNDLE BRANCH BLOCK: ICD-10-CM

## 2019-07-01 PROCEDURE — 93306 TTE W/DOPPLER COMPLETE: CPT | Performed by: INTERNAL MEDICINE

## 2019-07-01 PROCEDURE — 40000264 ZZHC STATISTIC IV PUSH SINGLE INITIAL SUBSTANCE: Performed by: INTERNAL MEDICINE

## 2019-07-01 RX ADMIN — Medication 7 ML: at 12:00

## 2019-07-02 ENCOUNTER — TELEPHONE (OUTPATIENT)
Dept: CARDIOLOGY | Facility: CLINIC | Age: 64
End: 2019-07-02

## 2019-07-02 NOTE — TELEPHONE ENCOUNTER
[facility] Call Center    Phone Message    May a detailed message be left on voicemail: yes    Reason for Call: Other: patient was called to schedule a Follow up with Dr. Lucero William but first available isn't till 9/9 the last time he was seen was 06/2018. Is it okay for him to wait he just had an EKG done on 7/1/2019     Action Taken: Message routed to:  [facility] ([facility]

## 2019-07-03 ENCOUNTER — TELEPHONE (OUTPATIENT)
Dept: CARDIOLOGY | Facility: CLINIC | Age: 64
End: 2019-07-03

## 2019-07-03 NOTE — TELEPHONE ENCOUNTER
Left a message for patient to call back. Informed him we will work him in 7/22 with Dr. William. Carla Peña GAYLE

## 2019-07-03 NOTE — TELEPHONE ENCOUNTER
Health Call Center    Phone Message    May a detailed message be left on voicemail: yes    Reason for Call: Other: Pt has a surgery appointment to remove a screw in his leg under anesthesia on 07/09, and was wanting to speak with Dr. William about if he is ok to go through with that appointment. Please give him a call back to discuss.     Action Taken: Message routed to:  [facility] ([facility] Cardiology

## 2019-07-05 NOTE — TELEPHONE ENCOUNTER
Spoke with pt. Stated he is having conscious sedation for pin in his leg. After his last echocardiogram, he wanted to know if it was okay to proceed with this procedure.    Review of echo, report states when compared to prev study, no new changes.  Pt stated LBBB is not new.  Informed pt that based on this report, no obvious reason to postpone procedure.   Pt is schd to see Dr William on 7/22.    msg routed to Dr William for review

## 2019-07-22 ENCOUNTER — OFFICE VISIT (OUTPATIENT)
Dept: CARDIOLOGY | Facility: CLINIC | Age: 64
End: 2019-07-22
Payer: COMMERCIAL

## 2019-07-22 VITALS
HEART RATE: 63 BPM | SYSTOLIC BLOOD PRESSURE: 124 MMHG | WEIGHT: 218 LBS | OXYGEN SATURATION: 96 % | BODY MASS INDEX: 31.28 KG/M2 | DIASTOLIC BLOOD PRESSURE: 80 MMHG

## 2019-07-22 DIAGNOSIS — I44.7 LEFT BUNDLE BRANCH BLOCK: Primary | ICD-10-CM

## 2019-07-22 PROCEDURE — 99214 OFFICE O/P EST MOD 30 MIN: CPT | Mod: GC | Performed by: INTERNAL MEDICINE

## 2019-07-22 NOTE — LETTER
7/22/2019      RE: Mitchell Randolph  [address] [address]       Dear Colleague,    Thank you for the opportunity to participate in the care of your patient, Mitchell Randolph, at the [facility]. Please see a copy of my visit note below.    HPI:   Mr. Randolph is a 62yo M PMH HTN and HLD who is presenting for follow up to cardiology clinic after being found to have a LBBB obtained on pre-op ecg in 2018.     Since he saw us a year ago, Mitchell reports otherwise feeling well. He denies any chest pain, palpitations, CROWDER, leg swelling, dizzy spells, orthopnea or PND. He reports exercising less as he recently had ankle surgery, but he is able to walk a flight of stairs without symptoms. He denies any prior heart attacks or cardiac hx. Minimal etoh intake, and no smoking. His blood pressure is well controlled on lisinopril 40mg daily which was increased in the past year from 20 mg daily.    TTE on 7/1/2019 showed borderline (EF 50-55%) reduced left ventricular function is present. LVEF 52% based on biplane 2D tracing. Abnormal septal motion. Right ventricular function, chamber size, wall motion, and thickness are normal. Estimated PA systolic pressure is 26mmHg above the right atrial pressure. No significant valve dysfunction. The inferior vena cava was normal in size with preserved respiratory variability. IVC diameter <2.1 cm collapsing >50% with sniff suggests a normal RA pressure of 3 mmHg. No pericardial effusion is present.    PAST MEDICAL HISTORY:  Past Medical History:   Diagnosis Date     High cholesterol      Hypertension        CURRENT MEDICATIONS:  Current Outpatient Medications   Medication Sig Dispense Refill     lisinopril (PRINIVIL/ZESTRIL) 20 MG tablet Take 2 tablets (40 mg) by mouth daily 90 tablet 3     simvastatin (ZOCOR) 40 MG tablet Take 1 tablet (40 mg) by mouth At Bedtime Due for recheck in  March 2017 90 tablet 3     fluconazole (DIFLUCAN) 200 MG tablet 2 tablets as needed, repeat 2 tablets in 2 weeks if this is started (Patient not taking: Reported on 7/22/2019) 4 tablet 6       PAST SURGICAL HISTORY:  Past Surgical History:   Procedure Laterality Date     ORTHOPEDIC SURGERY  Mar 2019     TONSILLECTOMY  age 6 or 7       ALLERGIES:   No Known Allergies    FAMILY HISTORY:  - Premature coronary artery disease  - Atrial fibrillation  - Sudden cardiac death     SOCIAL HISTORY:  Social History     Tobacco Use     Smoking status: Never Smoker     Smokeless tobacco: Never Used   Substance Use Topics     Alcohol use: Yes     Comment: 2 beers per week     Drug use: No       ROS:   Constitutional: No fever, chills, or sweats. Weight stable.   ENT: No visual disturbance, ear ache, epistaxis, sore throat.   Cardiovascular: As per HPI.   Respiratory: No cough, hemoptysis.    GI: No nausea, vomiting, hematemesis, melena, or hematochezia.   : No hematuria.   Integument: Negative.   Psychiatric: Negative.   Hematologic:  Easy bruising, no easy bleeding.  Neuro: Negative.   Endocrinology: No significant heat or cold intolerance   Musculoskeletal: No myalgia.    Exam:  /80 (BP Location: Left arm, Patient Position: Sitting, Cuff Size: Adult Large)   Pulse 63   Wt 98.9 kg (218 lb)   SpO2 96%   BMI 31.28 kg/m     GENERAL APPEARANCE: healthy, alert and no distress  HEENT: no icterus, no xanthelasmas, normal pupil size and reaction, normal palate, mucosa moist, no central cyanosis  NECK: no adenopathy, no asymmetry, masses, or scars, thyroid normal to palpation and no bruits, JVP not elevated  RESPIRATORY: lungs clear to auscultation - no rales, rhonchi or wheezes, no use of accessory muscles, no retractions, respirations are unlabored, normal respiratory rate  CARDIOVASCULAR: regular rhythm, normal S1 with physiologic split S2, no S3 or S4 and no murmur, click or rub, precordium quiet with normal PMI.  ABDOMEN:  soft, non tender, without hepatosplenomegaly, no masses palpable, bowel sounds normal, aorta not enlarged by palpation, no abdominal bruits  EXTREMITIES: peripheral pulses normal, no edema, no bruits  NEURO: alert and oriented to person/place/time, normal speech, gait and affect  VASC: Radial, femoral, dorsalis pedis and posterior tibialis pulses are normal in volumes and symmetric bilaterally. No bruits are heard.  SKIN: no ecchymoses, no rashes    Labs:  CBC RESULTS:   Lab Results   Component Value Date    WBC 6.2 06/20/2019    RBC 5.32 06/20/2019    HGB 15.7 06/20/2019    HCT 47.4 06/20/2019    MCV 89 06/20/2019    MCH 29.5 06/20/2019    MCHC 33.1 06/20/2019    RDW 13.8 06/20/2019     06/20/2019       BMP RESULTS:  Lab Results   Component Value Date     04/04/2019    POTASSIUM 4.3 06/20/2019    CHLORIDE 108 04/04/2019    CO2 29 04/04/2019    ANIONGAP 5 04/04/2019    GLC 99 04/04/2019    BUN 22 04/04/2019    CR 1.02 06/20/2019    GFRESTIMATED 79 06/20/2019    GFRESTBLACK >90 06/20/2019    JULIO 9.1 04/04/2019        INR RESULTS:  No results found for: INR    Assessment and Plan:   Mr. Randolph is a 61yo M with HTN and HLD who was found to have LBBB on routine ECG prior to colonoscopy in 2018. Mr. Randolph is currently asymptomatic without physical limitations and normal cardiac exam. TTE on 7/1/19 showed EF 50% with abnormal septal motion. Given that he is asymptomatic he can be discharged from the [facility] and follow up as needed if new symptoms arise.      I have seen, interviewed, and examined patient. I have reviewed the laboratory tests, imaging, and other investigations. I have reviewed the management plan with the patient. I discussed with the team and agree with the findings and plan in this resident/fellow/nurse practitioner's note. In addition, changes in the physical examination, assessment and plan have been incorporated into the note by myself, as to make it a single cohesive document.        Lucero William MD, MS  Cardiology/Cardiac EP Attending Staff        CC  Patient Care Team:  Todd Oh MD as PCP - General (Family Practice)  Todd Oh MD as Assigned PCP  SELF, REFERRED

## 2019-07-22 NOTE — PATIENT INSTRUCTIONS
Thank you for coming to the [facility] [place]; please note the following instructions:    1. Follow up as needed per DR William        If you have any questions regarding your visit please contact your care team:     Cardiology  Telephone Number   Nafisa WISEMAN, RN  Jeanna MONTERO,RN  Thalia BENJAMIN, FÉLIX MORALES, MA  Keyanna MEDEL LPN   [phone]    *After hours: [phone]   For scheduling appts:     [phone] or    [phone] (select option 1)    *After hours: [phone]     For the Device Clinic (Pacemakers and ICD's)  RN's :  Danuta López   During business hours: [phone]    *After business hours:  [phone] (select option 4)      Normal test result notifications will be released via [employer] or mailed within 7 business days.  All other test results, will be communicated via telephone once reviewed by your cardiologist.    If you need a medication refill please contact your pharmacy.  Please allow 3 business days for your refill to be completed.    As always, thank you for trusting us with your health care needs!

## 2019-07-22 NOTE — NURSING NOTE
"Chief Complaint   Patient presents with     Follow Up     1 yr. follow up for LBBB (left bundle branch block) (Primary Dx). - dr Per patient fatigue with new job at times.       Initial /80 (BP Location: Left arm, Patient Position: Sitting, Cuff Size: Adult Large)   Pulse 63   Wt 98.9 kg (218 lb)   SpO2 96%   BMI 31.28 kg/m   Estimated body mass index is 31.28 kg/m  as calculated from the following:    Height as of 4/4/19: 1.778 m (5' 10\").    Weight as of this encounter: 98.9 kg (218 lb)..  BP completed using cuff size: large    Keyanna Rooney L.P.N.    "

## 2019-07-22 NOTE — PROGRESS NOTES
HPI:   Mr. Randolph is a 64yo M PMH HTN and HLD who is presenting for follow up to cardiology clinic after being found to have a LBBB obtained on pre-op ecg in 2018.     Since he saw us a year ago, Mitchell reports otherwise feeling well. He denies any chest pain, palpitations, CROWDER, leg swelling, dizzy spells, orthopnea or PND. He reports exercising less as he recently had ankle surgery, but he is able to walk a flight of stairs without symptoms. He denies any prior heart attacks or cardiac hx. Minimal etoh intake, and no smoking. His blood pressure is well controlled on lisinopril 40mg daily which was increased in the past year from 20 mg daily.    TTE on 7/1/2019 showed borderline (EF 50-55%) reduced left ventricular function is present. LVEF 52% based on biplane 2D tracing. Abnormal septal motion. Right ventricular function, chamber size, wall motion, and thickness are normal. Estimated PA systolic pressure is 26mmHg above the right atrial pressure. No significant valve dysfunction. The inferior vena cava was normal in size with preserved respiratory variability. IVC diameter <2.1 cm collapsing >50% with sniff suggests a normal RA pressure of 3 mmHg. No pericardial effusion is present.    PAST MEDICAL HISTORY:  Past Medical History:   Diagnosis Date     High cholesterol      Hypertension        CURRENT MEDICATIONS:  Current Outpatient Medications   Medication Sig Dispense Refill     lisinopril (PRINIVIL/ZESTRIL) 20 MG tablet Take 2 tablets (40 mg) by mouth daily 90 tablet 3     simvastatin (ZOCOR) 40 MG tablet Take 1 tablet (40 mg) by mouth At Bedtime Due for recheck in March 2017 90 tablet 3     fluconazole (DIFLUCAN) 200 MG tablet 2 tablets as needed, repeat 2 tablets in 2 weeks if this is started (Patient not taking: Reported on 7/22/2019) 4 tablet 6       PAST SURGICAL HISTORY:  Past Surgical History:   Procedure Laterality Date     ORTHOPEDIC SURGERY  Mar 2019     TONSILLECTOMY  age 6 or 7       ALLERGIES:   No  Known Allergies    FAMILY HISTORY:  - Premature coronary artery disease  - Atrial fibrillation  - Sudden cardiac death     SOCIAL HISTORY:  Social History     Tobacco Use     Smoking status: Never Smoker     Smokeless tobacco: Never Used   Substance Use Topics     Alcohol use: Yes     Comment: 2 beers per week     Drug use: No       ROS:   Constitutional: No fever, chills, or sweats. Weight stable.   ENT: No visual disturbance, ear ache, epistaxis, sore throat.   Cardiovascular: As per HPI.   Respiratory: No cough, hemoptysis.    GI: No nausea, vomiting, hematemesis, melena, or hematochezia.   : No hematuria.   Integument: Negative.   Psychiatric: Negative.   Hematologic:  Easy bruising, no easy bleeding.  Neuro: Negative.   Endocrinology: No significant heat or cold intolerance   Musculoskeletal: No myalgia.    Exam:  /80 (BP Location: Left arm, Patient Position: Sitting, Cuff Size: Adult Large)   Pulse 63   Wt 98.9 kg (218 lb)   SpO2 96%   BMI 31.28 kg/m    GENERAL APPEARANCE: healthy, alert and no distress  HEENT: no icterus, no xanthelasmas, normal pupil size and reaction, normal palate, mucosa moist, no central cyanosis  NECK: no adenopathy, no asymmetry, masses, or scars, thyroid normal to palpation and no bruits, JVP not elevated  RESPIRATORY: lungs clear to auscultation - no rales, rhonchi or wheezes, no use of accessory muscles, no retractions, respirations are unlabored, normal respiratory rate  CARDIOVASCULAR: regular rhythm, normal S1 with physiologic split S2, no S3 or S4 and no murmur, click or rub, precordium quiet with normal PMI.  ABDOMEN: soft, non tender, without hepatosplenomegaly, no masses palpable, bowel sounds normal, aorta not enlarged by palpation, no abdominal bruits  EXTREMITIES: peripheral pulses normal, no edema, no bruits  NEURO: alert and oriented to person/place/time, normal speech, gait and affect  VASC: Radial, femoral, dorsalis pedis and posterior tibialis pulses are  normal in volumes and symmetric bilaterally. No bruits are heard.  SKIN: no ecchymoses, no rashes    Labs:  CBC RESULTS:   Lab Results   Component Value Date    WBC 6.2 06/20/2019    RBC 5.32 06/20/2019    HGB 15.7 06/20/2019    HCT 47.4 06/20/2019    MCV 89 06/20/2019    MCH 29.5 06/20/2019    MCHC 33.1 06/20/2019    RDW 13.8 06/20/2019     06/20/2019       BMP RESULTS:  Lab Results   Component Value Date     04/04/2019    POTASSIUM 4.3 06/20/2019    CHLORIDE 108 04/04/2019    CO2 29 04/04/2019    ANIONGAP 5 04/04/2019    GLC 99 04/04/2019    BUN 22 04/04/2019    CR 1.02 06/20/2019    GFRESTIMATED 79 06/20/2019    GFRESTBLACK >90 06/20/2019    JULIO 9.1 04/04/2019        INR RESULTS:  No results found for: INR    Assessment and Plan:   Mr. Randolph is a 63yo M with HTN and HLD who was found to have LBBB on routine ECG prior to colonoscopy in 2018. Mr. Randolph is currently asymptomatic without physical limitations and normal cardiac exam. TTE on 7/1/19 showed EF 50% with abnormal septal motion. Given that he is asymptomatic he can be discharged from the [facility] and follow up as needed if new symptoms arise.      I have seen, interviewed, and examined patient. I have reviewed the laboratory tests, imaging, and other investigations. I have reviewed the management plan with the patient. I discussed with the team and agree with the findings and plan in this resident/fellow/nurse practitioner's note. In addition, changes in the physical examination, assessment and plan have been incorporated into the note by myself, as to make it a single cohesive document.       Lucero William MD, MS  Cardiology/Cardiac EP Attending Staff        CC  Patient Care Team:  Todd Oh MD as PCP - General (Family Practice)  Todd Oh MD as Assigned PCP  SELF, REFERRED

## 2019-07-24 ENCOUNTER — TRANSFERRED RECORDS (OUTPATIENT)
Dept: HEALTH INFORMATION MANAGEMENT | Facility: CLINIC | Age: 64
End: 2019-07-24

## 2019-09-12 ENCOUNTER — OFFICE VISIT (OUTPATIENT)
Dept: URGENT CARE | Facility: URGENT CARE | Age: 64
End: 2019-09-12
Payer: COMMERCIAL

## 2019-09-12 VITALS
DIASTOLIC BLOOD PRESSURE: 80 MMHG | HEART RATE: 66 BPM | WEIGHT: 218.4 LBS | BODY MASS INDEX: 31.34 KG/M2 | OXYGEN SATURATION: 98 % | SYSTOLIC BLOOD PRESSURE: 152 MMHG | TEMPERATURE: 98.2 F

## 2019-09-12 DIAGNOSIS — I10 HYPERTENSION WITH GOAL BLOOD PRESSURE LESS THAN 140/90: ICD-10-CM

## 2019-09-12 DIAGNOSIS — H69.93 DYSFUNCTION OF BOTH EUSTACHIAN TUBES: ICD-10-CM

## 2019-09-12 DIAGNOSIS — H65.03 BILATERAL ACUTE SEROUS OTITIS MEDIA, RECURRENCE NOT SPECIFIED: Primary | ICD-10-CM

## 2019-09-12 PROCEDURE — 99213 OFFICE O/P EST LOW 20 MIN: CPT | Performed by: PHYSICIAN ASSISTANT

## 2019-09-12 RX ORDER — AMOXICILLIN 875 MG
875 TABLET ORAL 2 TIMES DAILY
Qty: 20 TABLET | Refills: 0 | Status: SHIPPED | OUTPATIENT
Start: 2019-09-12 | End: 2019-09-22

## 2019-09-12 RX ORDER — LISINOPRIL 20 MG/1
TABLET ORAL
Qty: 180 TABLET | Refills: 3 | Status: SHIPPED | OUTPATIENT
Start: 2019-09-12 | End: 2020-09-04

## 2019-09-12 RX ORDER — CETIRIZINE HYDROCHLORIDE 10 MG/1
10 TABLET ORAL DAILY
Qty: 30 TABLET | Refills: 0 | Status: SHIPPED | OUTPATIENT
Start: 2019-09-12 | End: 2021-02-26

## 2019-09-12 ASSESSMENT — ENCOUNTER SYMPTOMS
FATIGUE: 0
RESPIRATORY NEGATIVE: 1
SORE THROAT: 0
WHEEZING: 0
GASTROINTESTINAL NEGATIVE: 1
CHILLS: 0
SHORTNESS OF BREATH: 0
FEVER: 0
CHEST TIGHTNESS: 0
COUGH: 0
PALPITATIONS: 0
SINUS PAIN: 0
RHINORRHEA: 0
SINUS PRESSURE: 0
CARDIOVASCULAR NEGATIVE: 1

## 2019-09-12 NOTE — PROGRESS NOTES
Subjective   Mitchell Randolph is a 64 year old male who presents to clinic today for the following health issues:  HPI   RESPIRATORY SYMPTOMS    Duration: 2weeks    Description  B ear pain with plugged sensation and decrease hearing.  No drainage, dizziness, HA or n/v.    Severity: moderate    Accompanying signs and symptoms: No cough, shortness of breath or wheezing.  Mild sinus congestion but no pain/pressure.  No abdominal pain, n/v, constipation, diarrhea, bloody or black tarry stools.  No fever, chills or sweats.  No recent swimming or airplane ride.      History (predisposing factors):  No ill contacts.  No pmh of asthma.  Non-smoker.    Precipitating or alleviating factors: None    Therapies tried and outcome:  rest and fluids without any relief      Patient Active Problem List   Diagnosis     Slow urinary stream     HYPERLIPIDEMIA LDL GOAL <130     Tinea corporis     Advanced directives, counseling/discussion     Hypertension with goal blood pressure less than 140/90     Family history of prostate cancer in father     Past Surgical History:   Procedure Laterality Date     ORTHOPEDIC SURGERY  Mar 2019     TONSILLECTOMY  age 6 or 7       Social History     Tobacco Use     Smoking status: Never Smoker     Smokeless tobacco: Never Used   Substance Use Topics     Alcohol use: Yes     Comment: 2 beers per week     Family History   Problem Relation Age of Onset     Cancer Maternal Grandfather         bone         Current Outpatient Medications   Medication Sig Dispense Refill     lisinopril (PRINIVIL/ZESTRIL) 20 MG tablet TAKE 2 TABLETS BY MOUTH (40MG) DAILY 180 tablet 3     simvastatin (ZOCOR) 40 MG tablet Take 1 tablet (40 mg) by mouth At Bedtime Due for recheck in March 2017 90 tablet 3     No Known Allergies  Reviewed and updated as needed this visit by Provider       Review of Systems   Constitutional: Negative for chills, fatigue and fever.   HENT: Positive for congestion, ear pain and hearing loss. Negative for  ear discharge, rhinorrhea, sinus pressure, sinus pain and sore throat.    Respiratory: Negative.  Negative for cough, chest tightness, shortness of breath and wheezing.    Cardiovascular: Negative.  Negative for chest pain, palpitations and peripheral edema.   Gastrointestinal: Negative.    All other systems reviewed and are negative.           Objective    BP (!) 152/80 (BP Location: Left arm, Patient Position: Chair, Cuff Size: Adult Regular)   Pulse 66   Temp 98.2  F (36.8  C) (Oral)   Wt 99.1 kg (218 lb 6.4 oz)   SpO2 98%   BMI 31.34 kg/m    Body mass index is 31.34 kg/m .  Physical Exam   Constitutional: He is oriented to person, place, and time. He appears well-developed and well-nourished. No distress.   HENT:   Head: Normocephalic and atraumatic.   Right Ear: Hearing, external ear and ear canal normal. Tympanic membrane is erythematous and retracted. Tympanic membrane is not perforated and not bulging.   Left Ear: Hearing, external ear and ear canal normal. Tympanic membrane is erythematous and retracted. Tympanic membrane is not perforated and not bulging.   Nose: Nose normal.   Mouth/Throat: Uvula is midline, oropharynx is clear and moist and mucous membranes are normal. No oropharyngeal exudate or posterior oropharyngeal erythema.   Airway is patent.   Eyes: Pupils are equal, round, and reactive to light. Conjunctivae and EOM are normal.   Neck: Normal range of motion. Neck supple.   Cardiovascular: Normal rate, regular rhythm, normal heart sounds and intact distal pulses. Exam reveals no gallop.   No murmur heard.  Pulmonary/Chest: Effort normal and breath sounds normal. No accessory muscle usage. No respiratory distress. He has no decreased breath sounds. He has no wheezes. He has no rhonchi. He has no rales. He exhibits no tenderness.   Lymphadenopathy:     He has no cervical adenopathy.   Neurological: He is alert and oriented to person, place, and time.   Skin: Skin is warm and dry.    Psychiatric: He has a normal mood and affect. His behavior is normal. Judgment and thought content normal.   Nursing note and vitals reviewed.           Assessment & Plan   Bilateral acute serous otitis media, recurrence not specified:  Will treat with [id number] for OM.  Recommend tylenol/ibuprofen prn pain/fever and zyrtec for sinus congestion.   Rest, fluids, chicken soup.  Recheck in clinic if symptoms worsen or if symptoms do not improve.    -     amoxicillin (AMOXIL) 875 MG tablet; Take 1 tablet (875 mg) by mouth 2 times daily for 10 days    Dysfunction of both eustachian tubes  -     cetirizine (ZYRTEC) 10 MG tablet; Take 1 tablet (10 mg) by mouth daily Ear congestion        Marina Crawley PA-C  [facility]

## 2019-11-01 ENCOUNTER — OFFICE VISIT (OUTPATIENT)
Dept: FAMILY MEDICINE | Facility: CLINIC | Age: 64
End: 2019-11-01
Payer: COMMERCIAL

## 2019-11-01 VITALS
DIASTOLIC BLOOD PRESSURE: 76 MMHG | OXYGEN SATURATION: 96 % | TEMPERATURE: 98.3 F | BODY MASS INDEX: 31.14 KG/M2 | HEART RATE: 83 BPM | SYSTOLIC BLOOD PRESSURE: 134 MMHG | WEIGHT: 217 LBS

## 2019-11-01 DIAGNOSIS — Z23 NEED FOR PROPHYLACTIC VACCINATION AND INOCULATION AGAINST INFLUENZA: ICD-10-CM

## 2019-11-01 DIAGNOSIS — R50.9 FEBRILE ILLNESS: Primary | ICD-10-CM

## 2019-11-01 DIAGNOSIS — J06.9 BACTERIAL URI: ICD-10-CM

## 2019-11-01 DIAGNOSIS — B96.89 BACTERIAL URI: ICD-10-CM

## 2019-11-01 LAB
DEPRECATED S PYO AG THROAT QL EIA: NORMAL
SPECIMEN SOURCE: NORMAL

## 2019-11-01 PROCEDURE — 90471 IMMUNIZATION ADMIN: CPT | Performed by: INTERNAL MEDICINE

## 2019-11-01 PROCEDURE — 90682 RIV4 VACC RECOMBINANT DNA IM: CPT | Performed by: INTERNAL MEDICINE

## 2019-11-01 PROCEDURE — 87880 STREP A ASSAY W/OPTIC: CPT | Performed by: INTERNAL MEDICINE

## 2019-11-01 PROCEDURE — 87081 CULTURE SCREEN ONLY: CPT | Performed by: INTERNAL MEDICINE

## 2019-11-01 PROCEDURE — 99214 OFFICE O/P EST MOD 30 MIN: CPT | Mod: 25 | Performed by: INTERNAL MEDICINE

## 2019-11-01 RX ORDER — AZITHROMYCIN 250 MG/1
TABLET, FILM COATED ORAL
Qty: 6 TABLET | Refills: 0 | Status: SHIPPED | OUTPATIENT
Start: 2019-11-01 | End: 2019-11-06

## 2019-11-01 NOTE — NURSING NOTE
Patient instructed to remain in clinic for 15 minutes afterwards, and to report any adverse reaction to me immediately.    Prior to injection verified patient identity using patient's name and date of birth.  Vaccine information supplied for influenza.  Marina See RICKY Weaver

## 2019-11-01 NOTE — PROGRESS NOTES
Subjective     Mitchell Randolph is a 64 year old male who presents to clinic today for the following health issues:    HPI   Acute Illness   Acute illness concerns: throat  Onset: a few weeks    Fever: Unsure    Chills/Sweats: YES- chills    Headache (location?): YES    Sinus Pressure:no    Conjunctivitis:  no    Ear Pain: YES- feels plugged    Rhinorrhea: no but feels drainage down throat    Congestion: no    Sore Throat: irritation in the morning and starting to have a hoarse voice     Cough: YES-non-productive    Wheeze: no    Decreased Appetite: no    Nausea: no    Vomiting: no    Diarrhea:  no    Dysuria/Freq.: no    Fatigue/Achiness: YES    Sick/Strep Exposure: no     Therapies Tried and outcome: OTC Zycam which helps  No muscle aches  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          Patient Active Problem List   Diagnosis     Slow urinary stream     HYPERLIPIDEMIA LDL GOAL <130     Tinea corporis     Advanced directives, counseling/discussion     Hypertension with goal blood pressure less than 140/90     Family history of prostate cancer in father     Past Surgical History:   Procedure Laterality Date     ORTHOPEDIC SURGERY  Mar 2019     TONSILLECTOMY  age 6 or 7       Social History     Tobacco Use     Smoking status: Never Smoker     Smokeless tobacco: Never Used   Substance Use Topics     Alcohol use: Yes     Comment: 2 beers per week     Family History   Problem Relation Age of Onset     Cancer Maternal Grandfather         bone         Current Outpatient Medications   Medication Sig Dispense Refill     azithromycin (ZITHROMAX) 250 MG tablet Take 2 tablets (500 mg) by mouth daily for 1 day, THEN 1 tablet (250 mg) daily for 4 days. 6 tablet 0     lisinopril (PRINIVIL/ZESTRIL) 20 MG tablet TAKE 2 TABLETS BY MOUTH (40MG) DAILY 180 tablet 3     simvastatin (ZOCOR) 40 MG tablet Take 1 tablet (40 mg) by mouth At Bedtime Due for recheck in March 2017 90 tablet 3     cetirizine (ZYRTEC) 10 MG tablet Take 1 tablet (10 mg)  by mouth daily Ear congestion (Patient not taking: Reported on 11/1/2019) 30 tablet 0     No Known Allergies  Recent Labs   Lab Test 06/20/19  0728 04/04/19  0929 04/02/18  0833 03/27/17  0830  04/03/14  0740 04/02/13  0902 03/22/12  0757   A1C  --   --   --   --   --  5.6 5.7 5.5   LDL  --  92 104* 139*   < > 100 109 107   HDL  --  43 49 51   < > 42 46 42   TRIG  --  124 128 164*   < > 111 119 103   ALT  --   --  37 38  --   --   --  39   CR 1.02 0.95 1.01 1.02   < > 1.07  --   --    GFRESTIMATED 79 86 75 75   < > 71  --   --    GFRESTBLACK >90 >90 >90 >90  African American GFR Calc     < > 87  --   --    POTASSIUM 4.3 4.1 4.3 4.4   < > 4.5  --   --    TSH  --   --   --   --   --   --   --  1.30    < > = values in this interval not displayed.          Reviewed and updated as needed this visit by Provider         Review of Systems   ROS COMP: Constitutional, HEENT, cardiovascular, pulmonary, gi and gu systems are negative, except as otherwise noted.      Objective    /76 (BP Location: Right arm, Patient Position: Chair, Cuff Size: Adult Regular)   Pulse 83   Temp 98.3  F (36.8  C) (Oral)   Wt 98.4 kg (217 lb)   SpO2 96%   BMI 31.14 kg/m    Body mass index is 31.14 kg/m .  Physical Exam   GENERAL: healthy, alert and no distress  EYES: Eyes grossly normal to inspection, PERRL and conjunctivae and sclerae normal  HENT: ear canals and TM's normal, nose and mouth without ulcers or lesions  NECK: no adenopathy, no asymmetry, masses, or scars and thyroid normal to palpation  RESP: lungs clear to auscultation - no rales, rhonchi or wheezes  CV: regular rate and rhythm, normal S1 S2, no S3 or S4, no murmur, click or rub, no peripheral edema and peripheral pulses strong  ABDOMEN: soft, nontender, no hepatosplenomegaly, no masses and bowel sounds normal  MS: no gross musculoskeletal defects noted, no edema  SKIN: no suspicious lesions or rashes  NEURO: Normal strength and tone, mentation intact and speech  normal  BACK: no CVA tenderness, no paralumbar tenderness  PSYCH: mentation appears normal, affect normal/bright  LYMPH: no cervical, supraclavicular, axillary, or inguinal adenopathy    Diagnostic Test Results:  Labs reviewed in Epic  Results for orders placed or performed in visit on 11/01/19   Rapid strep screen     Status: None   Result Value Ref Range    Specimen Description Throat     Rapid Strep A Screen       NEGATIVE: No Group A streptococcal antigen detected by immunoassay, await culture report.   Beta strep group A culture     Status: None   Result Value Ref Range    Specimen Description Throat     Culture Micro No beta hemolytic Streptococcus Group A isolated            Assessment & Plan       ICD-10-CM    1. Febrile illness R50.9 Rapid strep screen     Beta strep group A culture   2. Bacterial URI J06.9 azithromycin (ZITHROMAX) 250 MG tablet    B96.89    3. Need for prophylactic vaccination and inoculation against influenza Z23 INFLUENZA QUAD, RECOMBINANT, P-FREE (RIV4) (FLUBLOCK) [90089]     Vaccine Administration, Initial [50620]          Regular exercise    No follow-ups on file.    Jey Joya MD  [facility]

## 2019-11-02 LAB
BACTERIA SPEC CULT: NORMAL
SPECIMEN SOURCE: NORMAL

## 2020-03-17 ENCOUNTER — TELEPHONE (OUTPATIENT)
Dept: FAMILY MEDICINE | Facility: CLINIC | Age: 65
End: 2020-03-17

## 2020-03-17 DIAGNOSIS — E78.5 HYPERLIPIDEMIA LDL GOAL <130: ICD-10-CM

## 2020-03-17 NOTE — TELEPHONE ENCOUNTER
Reason for Call:  Other appointment    Detailed comments: patient is concerned with the recent events that have happened and would like to discuss a possible telephone visit instead of having his scheduled physical. He would like a call back to discuss options for the time being.     Phone Number Patient can be reached at: Cell number on file:    Telephone Information:   Mobile [phone]       Best Time: As soon as possible     Can we leave a detailed message on this number? YES    Call taken on 3/17/2020 at 3:05 PM by Jono Gomez

## 2020-03-17 NOTE — TELEPHONE ENCOUNTER
Detailed comments: patient is concerned with the recent events that have happened and would like to discuss a possible telephone visit instead of having his scheduled physical. He would like a call back to discuss options for the time being.     Patient scheduled 4/6/20 for physical  - PCP - okay to do telephone visit? Or postpone until July?     Evi Mauricio RN, BSN, PHN  [facility]: [place]

## 2020-03-18 RX ORDER — SIMVASTATIN 40 MG
40 TABLET ORAL AT BEDTIME
Qty: 90 TABLET | Refills: 3 | Status: SHIPPED | OUTPATIENT
Start: 2020-03-18 | End: 2020-07-16

## 2020-03-18 NOTE — TELEPHONE ENCOUNTER
We are recommending pushing off physicals until July  If there are acute issues that need follow-up, can do a telephone in between

## 2020-03-18 NOTE — TELEPHONE ENCOUNTER
Appointment made for a physical on 7/9/2020.  Patient does not see  A need for a phone visit earlier but does need a refill on his Simvastatin, please send a refill to [facility].

## 2020-06-08 ENCOUNTER — OFFICE VISIT (OUTPATIENT)
Dept: FAMILY MEDICINE | Facility: CLINIC | Age: 65
End: 2020-06-08
Payer: COMMERCIAL

## 2020-06-08 VITALS
TEMPERATURE: 97.9 F | HEART RATE: 59 BPM | OXYGEN SATURATION: 99 % | DIASTOLIC BLOOD PRESSURE: 86 MMHG | WEIGHT: 218 LBS | HEIGHT: 69 IN | SYSTOLIC BLOOD PRESSURE: 149 MMHG | BODY MASS INDEX: 32.29 KG/M2

## 2020-06-08 DIAGNOSIS — M54.9 UPPER BACK PAIN ON RIGHT SIDE: Primary | ICD-10-CM

## 2020-06-08 PROCEDURE — 99213 OFFICE O/P EST LOW 20 MIN: CPT | Performed by: FAMILY MEDICINE

## 2020-06-08 ASSESSMENT — MIFFLIN-ST. JEOR: SCORE: 1779.22

## 2020-06-08 NOTE — PROGRESS NOTES
Subjective     Mitchell Randolph is a 64 year old male who presents to clinic today for the following health issues:    HPI   Patient came in with concerns about shingles and sharp back pain.  Patient comes in today reports been having sharp pain at the age of his right scapula, he reports no other symptoms.  He has been for 4 to 5 days.  He reports pain on and off.  Feels mostly when he lay flat or moves certain ways.  He reports a few years ago he had similar symptom he was concerned could be shingles.  However, he denies any rash, denies any fever, denies numbness and burning sensation.  He reports the pain is achy to sometimes sharp comes and goes.  Does not bother him at night.  Does not notice it when he is working    Denies recent injury or trauma.  Denies lower extremity weakness, denies neck pain or stiffness.    Patient Active Problem List   Diagnosis     Slow urinary stream     HYPERLIPIDEMIA LDL GOAL <130     Tinea corporis     Advanced directives, counseling/discussion     Hypertension with goal blood pressure less than 140/90     Family history of prostate cancer in father     Past Surgical History:   Procedure Laterality Date     ORTHOPEDIC SURGERY  Mar 2019     TONSILLECTOMY  age 6 or 7       Social History     Tobacco Use     Smoking status: Never Smoker     Smokeless tobacco: Never Used   Substance Use Topics     Alcohol use: Yes     Comment: 2 beers per week     Family History   Problem Relation Age of Onset     Cancer Maternal Grandfather         bone         Current Outpatient Medications   Medication Sig Dispense Refill     lisinopril (PRINIVIL/ZESTRIL) 20 MG tablet TAKE 2 TABLETS BY MOUTH (40MG) DAILY 180 tablet 3     simvastatin (ZOCOR) 40 MG tablet Take 1 tablet (40 mg) by mouth At Bedtime Due for recheck in March 2017 90 tablet 3     cetirizine (ZYRTEC) 10 MG tablet Take 1 tablet (10 mg) by mouth daily Ear congestion (Patient not taking: Reported on 11/1/2019) 30 tablet 0     No Known  "Allergies    Reviewed and updated as needed this visit by Provider         Review of Systems   Constitutional, HEENT, cardiovascular, pulmonary, gi and gu systems are negative, except as otherwise noted.      Objective    There were no vitals taken for this visit.  There is no height or weight on file to calculate BMI.  Physical Exam   GENERAL: healthy, alert and no distress  NEURO: Normal strength and tone, mentation intact and speech normal  BACK: no CVA tenderness, no paralumbar tenderness  PSYCH: mentation appears normal, affect normal/bright  Upper back exam: normal, no rash and no tenderness    Diagnostic Test Results:  Labs reviewed in Epic  none         Assessment & Plan       ICD-10-CM    1. Upper back pain on right side  M54.9    Patient exam was normal today.  I did not notice any rash.  I did reassure the patient that his symptom does not sound shingles.  Discussed with him could be musculoskeletal.  Was advised with supportive care, apply heat, massage, use topical Biofreeze as needed.    Follow-up     BMI:   Estimated body mass index is 32.19 kg/m  as calculated from the following:    Height as of this encounter: 1.753 m (5' 9\").    Weight as of this encounter: 98.9 kg (218 lb).   Weight management plan: Discussed healthy diet and exercise guidelines        See Patient Instructions    Return in about 3 months (around 9/8/2020) for Physical Exam.    Shaquille Alvarado MD  [facility]    "

## 2020-07-13 DIAGNOSIS — E78.5 HYPERLIPIDEMIA LDL GOAL <130: ICD-10-CM

## 2020-07-13 NOTE — TELEPHONE ENCOUNTER
Reason for Call:  Medication or medication refill:    Do you use a [facility]?  Name of the pharmacy and phone number for the current request:  [facility] - [place], [place] - [address]     Name of the medication requested: simvastatin (ZOCOR) 40 MG tablet    Other request: n/a    Can we leave a detailed message on this number? YES    Phone number patient can be reached at: Cell number on file:    Telephone Information:   Mobile [phone]       Best Time: any    Call taken on 7/13/2020 at 12:49 PM by Jyoti Alford

## 2020-07-16 RX ORDER — SIMVASTATIN 40 MG
40 TABLET ORAL AT BEDTIME
Qty: 90 TABLET | Refills: 3 | Status: SHIPPED | OUTPATIENT
Start: 2020-07-16 | End: 2020-09-04

## 2020-07-16 NOTE — TELEPHONE ENCOUNTER
"Requested Prescriptions   Pending Prescriptions Disp Refills     simvastatin (ZOCOR) 40 MG tablet 90 tablet 3     Sig: Take 1 tablet (40 mg) by mouth At Bedtime Due for recheck in March 2017       Statins Protocol Failed - 7/13/2020  1:00 PM        Failed - LDL on file in past 12 months     Recent Labs   Lab Test 04/04/19  0929   LDL 92             Passed - No abnormal creatine kinase in past 12 months     Recent Labs   Lab Test 04/04/19  0929                   Passed - Recent (12 mo) or future (30 days) visit within the authorizing provider's specialty     Patient has had an office visit with the authorizing provider or a provider within the authorizing providers department within the previous 12 mos or has a future within next 30 days. See \"Patient Info\" tab in inbasket, or \"Choose Columns\" in Meds & Orders section of the refill encounter.              Passed - Medication is active on med list        Passed - Patient is age 18 or older           Routing refill request to provider for review/approval because:  Labs not current:   Loren Murry,RN,BSN  [facility]          "

## 2020-09-04 ENCOUNTER — OFFICE VISIT (OUTPATIENT)
Dept: FAMILY MEDICINE | Facility: CLINIC | Age: 65
End: 2020-09-04
Payer: COMMERCIAL

## 2020-09-04 VITALS
SYSTOLIC BLOOD PRESSURE: 142 MMHG | BODY MASS INDEX: 31.55 KG/M2 | WEIGHT: 213 LBS | HEIGHT: 69 IN | OXYGEN SATURATION: 97 % | HEART RATE: 88 BPM | TEMPERATURE: 98.1 F | DIASTOLIC BLOOD PRESSURE: 80 MMHG

## 2020-09-04 DIAGNOSIS — I10 HYPERTENSION WITH GOAL BLOOD PRESSURE LESS THAN 140/90: ICD-10-CM

## 2020-09-04 DIAGNOSIS — H93.13 TINNITUS, BILATERAL: ICD-10-CM

## 2020-09-04 DIAGNOSIS — Z00.00 ROUTINE GENERAL MEDICAL EXAMINATION AT A HEALTH CARE FACILITY: Primary | ICD-10-CM

## 2020-09-04 DIAGNOSIS — B35.4 TINEA CORPORIS: ICD-10-CM
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
## 2020-09-04 DIAGNOSIS — E55.9 VITAMIN D DEFICIENCY: ICD-10-CM

## 2020-09-04 DIAGNOSIS — E78.5 HYPERLIPIDEMIA LDL GOAL <130: ICD-10-CM

## 2020-09-04 DIAGNOSIS — I44.7 LBBB (LEFT BUNDLE BRANCH BLOCK): ICD-10-CM

## 2020-09-04 LAB
ALBUMIN SERPL-MCNC: 4.3 G/DL (ref 3.4–5)
ALP SERPL-CCNC: 78 U/L (ref 40–150)
ALT SERPL W P-5'-P-CCNC: 37 U/L (ref 0–70)
AST SERPL W P-5'-P-CCNC: 15 U/L (ref 0–45)
BILIRUB DIRECT SERPL-MCNC: 0.2 MG/DL (ref 0–0.2)
BILIRUB SERPL-MCNC: 0.9 MG/DL (ref 0.2–1.3)
CHOLEST SERPL-MCNC: 192 MG/DL
HDLC SERPL-MCNC: 52 MG/DL
LDLC SERPL CALC-MCNC: 118 MG/DL
NONHDLC SERPL-MCNC: 140 MG/DL
PROT SERPL-MCNC: 7.3 G/DL (ref 6.8–8.8)
TRIGL SERPL-MCNC: 110 MG/DL

## 2020-09-04 PROCEDURE — 80061 LIPID PANEL: CPT | Performed by: INTERNAL MEDICINE

## 2020-09-04 PROCEDURE — 99396 PREV VISIT EST AGE 40-64: CPT | Mod: 25 | Performed by: INTERNAL MEDICINE

## 2020-09-04 PROCEDURE — 90714 TD VACC NO PRESV 7 YRS+ IM: CPT | Performed by: INTERNAL MEDICINE

## 2020-09-04 PROCEDURE — 90471 IMMUNIZATION ADMIN: CPT | Performed by: INTERNAL MEDICINE

## 2020-09-04 PROCEDURE — 86618 LYME DISEASE ANTIBODY: CPT | Performed by: INTERNAL MEDICINE

## 2020-09-04 PROCEDURE — 82306 VITAMIN D 25 HYDROXY: CPT | Performed by: INTERNAL MEDICINE

## 2020-09-04 PROCEDURE — 80076 HEPATIC FUNCTION PANEL: CPT | Performed by: INTERNAL MEDICINE

## 2020-09-04 PROCEDURE — 36415 COLL VENOUS BLD VENIPUNCTURE: CPT | Performed by: INTERNAL MEDICINE

## 2020-09-04 RX ORDER — LISINOPRIL 20 MG/1
TABLET ORAL
Qty: 180 TABLET | Refills: 3 | Status: SHIPPED | OUTPATIENT
Start: 2020-09-04 | End: 2021-02-25

## 2020-09-04 RX ORDER — SIMVASTATIN 40 MG
40 TABLET ORAL AT BEDTIME
Qty: 90 TABLET | Refills: 3 | Status: SHIPPED | OUTPATIENT
Start: 2020-09-04 | End: 2021-08-30

## 2020-09-04 RX ORDER — FLUCONAZOLE 200 MG/1
200 TABLET ORAL DAILY
Qty: 4 TABLET | Refills: 6 | Status: SHIPPED | OUTPATIENT
Start: 2020-09-04 | End: 2020-09-25

## 2020-09-04 ASSESSMENT — MIFFLIN-ST. JEOR: SCORE: 1762.73

## 2020-09-04 NOTE — PROGRESS NOTES
"  3  SUBJECTIVE:   CC: Mitchell Randolph is an 64 year old male who presents for preventive health visit.     Healthy Habits:  Do you get at least three servings of calcium containing foods daily (dairy, green leaGENERAL: healthy, alert and no distress  NECK: no adenopathy, no asymmetry, masses, or scars and thyroid normal to palpation  RESP: lungs clear to auscultation - no rales, rhonchi or wheezes  CV: regular rate and rhythm, normal S1 S2, no S3 or S4, no murmur, click or rub, no peripheral edema and peripheral pulses strong  ABDOMEN: soft, nontender, no hepatosplenomegaly, no masses and bowel sounds normal  MS: no gross musculoskeletal defects noted, no edema (BP Location: Right arm, Patient Position: Chair, Cuff Size: Adult Regular)   Pulse 88   Temp 98.1  F (36.7  C) (Oral)   Ht 1.763 m (5' 9.39\")   Wt 96.6 kg (213 lb)   SpO2 97%   BMI 31.10 kg/m    EXAM:  GENERAL: healthy, alert and no distress  NECK: no adenopathy, no asymmetry, masses, or scars and thyroid normal to palpation  RESP: lungs clear to auscultation - no rales, rhonchi or wheezes  CV: regular rate and rhythm, normal S1 S2, no S3 or S4, no murmur, click or rub, no peripheral edema and peripheral pulses strong  ABDOMEN: soft, nontender, no hepatosplenomegaly, no masses and bowel sounds normal  MS: no gross musculoskeletal defects noted, no edema    Diagnostic Test Results:  Labs reviewed in Epic  No results found for any visits on 09/04/20.    ASSESSMENT/PLAN:       ICD-10-CM    1. Routine general medical examination at a health care facility  Z00.00 TD (ADULT, 7+) PRESERVE FREE     VACCINE ADMINISTRATION, INITIAL   2. Vitamin D deficiency  E55.9 Vitamin D Deficiency   3. Tick bite, subsequent encounter  W57.XXXD Lyme Disease Nikole with reflex to WB Serum   4. Tinea corporis  B35.4 fluconazole (DIFLUCAN) 200 MG tablet   5. Hypertension with goal blood pressure less than 140/90  I10 lisinopril (ZESTRIL) 20 MG tablet     Hepatic panel (Albumin, ALT, " "AST, Bili, Alk Phos, TP)   6. Hyperlipidemia LDL goal <130  E78.5 simvastatin (ZOCOR) 40 MG tablet     Lipid panel reflex to direct LDL Fasting   7. LBBB (left bundle branch block)  I44.7 Echocardiogram Complete   8. Tinnitus, bilateral  H93.13 AUDIOLOGY ADULT REFERRAL       COUNSELING:  Reviewed preventive health counseling, as reflected in patient instructions       Regular exercise       Healthy diet/nutrition       Vision screening       Immunizations    Vaccinated for: Td             Colon cancer screening       Prostate cancer screening    Estimated body mass index is 31.1 kg/m  as calculated from the following:    Height as of this encounter: 1.763 m (5' 9.39\").    Weight as of this encounter: 96.6 kg (213 lb).    Weight management plan: Discussed healthy diet and exercise guidelines    He reports that he has never smoked. He has never used smokeless tobacco.      ICD-10-CM    1. Routine general medical examination at a health care facility  Z00.00 TD (ADULT, 7+) PRESERVE FREE     VACCINE ADMINISTRATION, INITIAL   2. Vitamin D deficiency  E55.9 Vitamin D Deficiency   3. Tick bite, subsequent encounter  W57.XXXD Lyme Disease Nikole with reflex to WB Serum   4. Tinea corporis  B35.4 fluconazole (DIFLUCAN) 200 MG tablet   5. Hypertension with goal blood pressure less than 140/90  I10 lisinopril (ZESTRIL) 20 MG tablet     Hepatic panel (Albumin, ALT, AST, Bili, Alk Phos, TP)   6. Hyperlipidemia LDL goal <130  E78.5 simvastatin (ZOCOR) 40 MG tablet     Lipid panel reflex to direct LDL Fasting   7. LBBB (left bundle branch block)  I44.7 Echocardiogram Complete   8. Tinnitus, bilateral  H93.13 AUDIOLOGY ADULT REFERRAL       Counseling Resources:  ATP IV Guidelines  Pooled Cohorts Equation Calculator  FRAX Risk Assessment  ICSI Preventive Guidelines  Dietary Guidelines for Americans, 2010  USDA's MyPlate  ASA Prophylaxis  Lung CA Screening    Jey Joya MD  [facility]  "

## 2020-09-04 NOTE — NURSING NOTE
Prior to immunization administration, verified patients identity using patient s name and date of birth. Please see Immunization Activity for additional information.     Screening Questionnaire for Adult Immunization    Are you sick today?   No   Do you have allergies to medications, food, a vaccine component or latex?   No   Have you ever had a serious reaction after receiving a vaccination?   No   Do you have a long-term health problem with heart, lung, kidney, or metabolic disease (e.g., diabetes), asthma, a blood disorder, no spleen, complement component deficiency, a cochlear implant, or a spinal fluid leak?  Are you on long-term aspirin therapy?   No   Do you have cancer, leukemia, HIV/AIDS, or any other immune system problem?   No   Do you have a parent, brother, or sister with an immune system problem?   No   In the past 3 months, have you taken medications that affect  your immune system, such as prednisone, other steroids, or anticancer drugs; drugs for the treatment of rheumatoid arthritis, Crohn s disease, or psoriasis; or have you had radiation treatments?   No   Have you had a seizure, or a brain or other nervous system problem?   No   During the past year, have you received a transfusion of blood or blood    products, or been given immune (gamma) globulin or antiviral drug?   No   For women: Are you pregnant or is there a chance you could become       pregnant during the next month?   No   Have you received any vaccinations in the past 4 weeks?   No     Immunization questionnaire answers were all negative.        Patient instructed to remain in clinic for 15 minutes afterwards, and to report any adverse reaction to me immediately.  Vaccine information supplied for TD.  Verified patient's last name and date of birth prior to injection.     Screening performed by Marina Weaver MA on 9/4/2020 at 11:55 AM.

## 2020-09-05 LAB — B BURGDOR IGG+IGM SER QL: 0.04 (ref 0–0.89)

## 2020-09-07 NOTE — PATIENT INSTRUCTIONS
Preventive Health Recommendations  Male Ages 50 - 64    Yearly exam:             See your health care provider every year in order to  o   Review health changes.   o   Discuss preventive care.    o   Review your medicines if your doctor has prescribed any.     Have a cholesterol test every 5 years, or more frequently if you are at risk for high cholesterol/heart disease.     Have a diabetes test (fasting glucose) every three years. If you are at risk for diabetes, you should have this test more often.     Have a colonoscopy at age 50, or have a yearly FIT test (stool test). These exams will check for colon cancer.      Talk with your health care provider about whether or not a prostate cancer screening test (PSA) is right for you.    You should be tested each year for STDs (sexually transmitted diseases), if you re at risk.     Shots: Get a flu shot each year. Get a tetanus shot every 10 years.     Nutrition:    Eat at least 5 servings of fruits and vegetables daily.     Eat whole-grain bread, whole-wheat pasta and brown rice instead of white grains and rice.     Get adequate Calcium and Vitamin D.     Lifestyle    Exercise for at least 150 minutes a week (30 minutes a day, 5 days a week). This will help you control your weight and prevent disease.     Limit alcohol to one drink per day.     No smoking.     Wear sunscreen to prevent skin cancer.     See your dentist every six months for an exam and cleaning.     See your eye doctor every 1 to 2 years.    Patient Education     Low-Salt Choices  Eating salt (sodium) can make your body retain too much water. Excess water makes your heart work harder. Canned, packaged, and frozen foods are easy to prepare. But they are often high in sodium. Here are some ideas for low-salt foods you can easily make yourself.    For breakfast    Fruit or 100% fruit juice    Whole-wheat bread or an English muffin. Look for sodium content on Nutrition Facts labels.    Low-fat milk or  yogurt    Unsalted eggs    Shredded wheat    Corn tortillas    Unsalted steamed rice    Regular (not instant) hot cereal, made without salt  Stay away from:    Sausage, rose, and ham    Flour tortillas    Packaged muffins, pancakes, and biscuits    Instant hot cereals    Cottage cheese    For lunch and dinner    Fresh fish, chicken, turkey, or meat--baked, broiled, or roasted without salt    Dry beans, cooked without salt    Tofu, stir-fried without salt    Unsalted fresh fruit and vegetables, or frozen or canned fruit and vegetables with no added salt  Stay away from:    Lunch or deli meat that is cured or smoked    Cheese    Tomato juice and ketchup    Canned vegetables, soups, and fish not labeled as no-salt-added or reduced sodium    Packaged gravies and sauces    Olives, pickles, and relish    Bottled salad dressings    For snacks and desserts    Yogurt    Unsalted, air-popped popcorn    Unsalted nuts or seeds  Stay away from:    Pies and cakes    Packaged dessert mixes    Pizza    Canned and packaged puddings    Pretzels, chips, crackers, and nuts--unless the label says unsalted    Date Last Reviewed: 6/1/2017 2000-2019 [employer]. [address] [place] [address]. All rights reserved. This information is not intended as a substitute for professional medical care. Always follow your healthcare professional's instructions.           Patient Education     Low-Salt Diet  This diet removes foods that are high in salt. It also limits the amount of salt you use when cooking. It is most often used for people with high blood pressure, edema (fluid retention), and kidney, liver, or heart disease.  Table salt contains the mineral sodium. Your body needs sodium to work normally. But too much sodium can make your health problems worse. Your healthcare provider is recommending a low-salt (also called low-sodium) diet for you. Your total daily allowance of salt is 1,500 to 2,300 milligrams (mg).  It is less than 1 teaspoon of table salt. This means you can have only about 500 to 700 mg of sodium at each meal. People with certain health problems should limit salt intake to the lower end of the recommended range.    When you cook, don t add much salt. If you can cook without using salt, even better. Don t add salt to your food at the table.  When shopping, read food labels. Salt is often called sodium on the label. Choose foods that are salt-free, low salt, or very low salt. Note that foods with reduced salt may not lower your salt intake enough.    Beans, potatoes, and pasta  Ok: Dry beans, split peas, lentils, potatoes, rice, macaroni, pasta, spaghetti without added salt  Avoid: Potato chips, tortilla chips, and similar products  Breads and cereals  Ok: Low-sodium breads, rolls, cereals, and cakes; low-salt crackers, matzo crackers  Avoid: Salted crackers, pretzels, popcorn, Belgian toast, pancakes, muffins  Dairy  Ok: Milk, chocolate milk, hot chocolate mix, low-salt cheeses, and yogurt  Avoid: Processed cheese and cheese spreads; Roquefort, Camembert, and cottage cheese; buttermilk, instant breakfast drink  Desserts  Ok: Ice cream, frozen yogurt, juice bars, gelatin, cookies and pies, sugar, honey, jelly, hard candy  Avoid: Most pies, cakes and cookies prepared or processed with salt; instant pudding  Drinks  Ok: Tea, coffee, fizzy (carbonated) drinks, juices  Avoid: Flavored coffees, electrolyte replacement drinks, sports drinks  Meats  Ok: All fresh meat, fish, poultry, low-salt tuna, eggs, egg substitute  Avoid: Smoked, pickled, brine-cured, or salted meats and fish. This includes rose, chipped beef, corned beef, hot dogs, deli meats, ham, kosher meats, salt pork, sausage, canned tuna, salted codfish, smoked salmon, herring, sardines, or anchovies.  Seasonings and spices  Ok: Most seasonings are okay. Good substitutes for salt include: fresh herb blends, hot sauce, lemon, garlic, sylvester, vinegar, dry  mustard, parsley, cilantro, horseradish, tomato paste, regular margarine, mayonnaise, unsalted butter, cream cheese, vegetable oil, cream, low-salt salad dressing and gravy.  Avoid: Regular ketchup, relishes, pickles, soy sauce, teriyaki sauce, Worcestershire sauce, BBQ sauce, tartar sauce, meat tenderizer, chili sauce, regular gravy, regular salad dressing, salted butter  Soups  Ok: Low-salt soups and broths made with allowed foods  Avoid: Bouillon cubes, soups with smoked or salted meats, regular soup and broth  Vegetables  Ok: Most vegetables are okay; also low-salt tomato and vegetable juices  Avoid: Sauerkraut and other brine-soaked vegetables; pickles and other pickled vegetables; tomato juice, olives  Date Last Reviewed: 8/1/2016 2000-2019 [employer]. [address] [place] [address]. All rights reserved. This information is not intended as a substitute for professional medical care. Always follow your healthcare professional's instructions.           Patient Education     Tips for Using Less Salt    Most people with heart problems need to eat less salt (sodium). Reducing the amount of salt you eat may help control your blood pressure. The higher your blood pressure, the greater your risk for heart disease, stroke, blindness, and kidney problems.  At the store    Make low-salt choices by reading labels carefully. Look for the total amount of sodium per serving.    Use more fresh food. Buy more fruits and vegetables. Select lean meats, fish, and poultry.    Use fewer frozen, canned, and packaged foods which often contain a lot of sodium.    Use plain frozen vegetables without sauces or toppings. These products are often low-sodium or no-sodium.    Choose reduced-sodium or no-salt-added versions of canned vegetables and soups.  In the kitchen    Don't add salt to food when you're cooking. Season with flavorings such as onion, garlic, pepper, salt-free herbal blends, and lemon or lime  juice.    Use a cookbook containing low-salt recipes. It can give you ideas for tasty meals that are healthy for your heart.    Sprinkle salt-free herbal blends on vegetables and meat.    Drain and rinse canned foods, such as canned beans and vegetables, before cooking or eating.  Eating out    Tell the  you're on a low-salt diet. Ask questions about the menu.    Order fish, chicken, and meat broiled, baked, poached, or grilled without salt, butter, or breading.    Use lemon, pepper, and salt-free herb mixes to add flavor.    Choose plain steamed rice, boiled noodles, and baked or boiled potatoes. Top potatoes with chives and a little sour cream.     Beware! Salt goes by many other names. Limit foods with these words listed as ingredients: salt, sodium, soy sauce, baking soda, baking powder, MSG, monosodium, Na (the chemical symbol for sodium). Some antacids are also high in salt.   Date Last Reviewed: 6/1/2017 2000-2019 [employer]. [address] [place] [address]. All rights reserved. This information is not intended as a substitute for professional medical care. Always follow your healthcare professional's instructions.            Yes - the patient is able to be screened

## 2020-09-08 LAB — DEPRECATED CALCIDIOL+CALCIFEROL SERPL-MC: 38 UG/L (ref 20–75)

## 2020-09-09 ENCOUNTER — TELEPHONE (OUTPATIENT)
Dept: OTOLARYNGOLOGY | Facility: CLINIC | Age: 65
End: 2020-09-09

## 2020-09-09 NOTE — TELEPHONE ENCOUNTER
Called pt to schedule Tinnitus Eval, as referred by Dr. Joya:    1.  Audiology WIN  2. [facility] NEW / VIDEO VISIT NEW / TELEPHONE VISIT NEW with Dr. Nissen or Dr. Garcia after completion of WIN    LVM with scheduling instructions and the ENT call center number.

## 2020-09-25 ENCOUNTER — MYC MEDICAL ADVICE (OUTPATIENT)
Dept: FAMILY MEDICINE | Facility: CLINIC | Age: 65
End: 2020-09-25

## 2020-09-25 DIAGNOSIS — B35.4 TINEA CORPORIS: ICD-10-CM

## 2020-09-25 RX ORDER — FLUCONAZOLE 200 MG/1
200 TABLET ORAL DAILY
Qty: 4 TABLET | Refills: 6 | Status: SHIPPED | OUTPATIENT
Start: 2020-09-25 | End: 2021-09-16

## 2020-09-25 NOTE — TELEPHONE ENCOUNTER
Re-sent fluconazole to local pharmacy per patient's request.    Tova Edgar RN  [facility]

## 2020-10-28 ENCOUNTER — IMMUNIZATION (OUTPATIENT)
Dept: NURSING | Facility: CLINIC | Age: 65
End: 2020-10-28
Payer: COMMERCIAL

## 2020-10-28 DIAGNOSIS — Z23 NEED FOR PROPHYLACTIC VACCINATION AND INOCULATION AGAINST INFLUENZA: Primary | ICD-10-CM

## 2020-10-28 PROCEDURE — 99207 PR NO CHARGE NURSE ONLY: CPT

## 2020-10-28 PROCEDURE — 90471 IMMUNIZATION ADMIN: CPT

## 2020-10-28 PROCEDURE — 90682 RIV4 VACC RECOMBINANT DNA IM: CPT

## 2020-11-04 ENCOUNTER — OFFICE VISIT (OUTPATIENT)
Dept: AUDIOLOGY | Facility: CLINIC | Age: 65
End: 2020-11-04
Attending: INTERNAL MEDICINE
Payer: COMMERCIAL

## 2020-11-04 DIAGNOSIS — H90.3 SENSORINEURAL HEARING LOSS, BILATERAL: Primary | ICD-10-CM

## 2020-11-04 DIAGNOSIS — H93.13 TINNITUS, BILATERAL: ICD-10-CM

## 2020-11-04 PROCEDURE — 92550 TYMPANOMETRY & REFLEX THRESH: CPT | Performed by: AUDIOLOGIST

## 2020-11-04 PROCEDURE — 92565 STENGER TEST PURE TONE: CPT | Performed by: AUDIOLOGIST

## 2020-11-04 PROCEDURE — 92557 COMPREHENSIVE HEARING TEST: CPT | Performed by: AUDIOLOGIST

## 2020-11-04 NOTE — PROGRESS NOTES
AUDIOLOGY REPORT    SUBJECTIVE:  Azam Randolph is a 65 year old male who was seen in Audiology at the [facility] for audiologic evaluation, referred by self.The patient reports  Longstanding tinnitus, that has worsened over the last year. azam has a history of noise exposure, including occupational and recreational. He is a right handed shooter. 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 The patient denies ear pain, drainage, dizziness an history of ear surgery.  The patient notes difficulty with communication in a variety of listening situations.    OBJECTIVE:  Abuse Screening:  Do you feel unsafe at home or work/school? No  Do you feel threatened by someone? No  Does anyone try to keep you from having contact with others, or doing things outside of your home? No  Physical signs of abuse present? No     Fall Risk Screen:  1. Have you fallen two or more times in the past year? No  2. Have you fallen and had an injury in the past year? No    Otoscopic exam indicates ears are clear of cerumen, bilaterally.    Pure Tone Thresholds assessed using conventional audiometry with good  reliability from 250-8000 Hz bilaterally using insert earphones and circumaural headphones     RIGHT:  normal sloping to moderate-severe sensorineural hearing loss, with asymmetry noted at 1500, 2000, and 6000 Hz when compared to the left ear.     LEFT:    normal sloping to moderate-severe sensorineural hearing loss    Tympanogram:    RIGHT: normal eardrum mobility    LEFT:   normal eardrum mobility    Reflexes (reported by stimulus ear):    RIGHT: Ipsilateral is present at normal levels    RIGHT: Contralateral is present at elevated levels     LEFT:   Ipsilateral is present at normal levels    LEFT:   Contralateral is absent at frequencies tested    Speech Reception Threshold:    RIGHT: 15 dB HL    LEFT:   15 dB HL  Word Recognition Score:     RIGHT: 96% at 75 dB HL using NU-6 recorded word  list.    LEFT:   100% at 75 dB HL using NU-6 recorded word list.      ASSESSMENT:   Normal ear drum mobility in both ears. Normal sloping to moderately severe sensorineural hearing loss, bilaterally with right asymmetry noted at 1500, 2000 and 6000 Hz. Today s results were discussed with the patient in detail.     PLAN:  Patient was counseled regarding hearing loss and impact on communication.  Patient is a good candidate for amplification at this time. It is recommended that the patient follow up with ENT regarding asymmetrical hearing loss. It was recommended he recheck his hearing annually. Please call this clinic with questions regarding these results or recommendations.      YOGESH Raphael.   Audiology Doctoral Extern  License #[id number]    I was present with the patient for the entire Audiology appointment including all procedures/testing performed by the AuD student, and agree with the student s assessment and plan as documented.      Deidra Hopkins, [facility]  Licensed Audiologist  [place] License #[id number]

## 2020-11-05 NOTE — TELEPHONE ENCOUNTER
FUTURE VISIT INFORMATION      FUTURE VISIT INFORMATION:    Date: 1/22/2021    Time: 3 PM    Location: [facility]  REFERRAL INFORMATION:    Referring provider:  AKIL Hopkins    Referring providers clinic:  [facility] - Audiology    Reason for visit/diagnosis: Asymmetric Hearing Loss    RECORDS REQUESTED FROM:       Clinic name Comments Records Status Imaging Status   [facility] 11/4/20 - AKIL OV with Akil Hopkins [facility] 9/4/20 - [facility] OV with Dr. Joya [facility] 9/12/19 -  OV with KAYKAY [facility]on [facility]    [facility] - Procedure 11/4/20 - Audiogram Epic

## 2021-01-20 NOTE — PATIENT INSTRUCTIONS
1. You were seen in the ENT Clinic today by Dr. Garcia.  If you have any questions or concerns after your appointment, please call   - Option 1: ENT Clinic: [phone]   - Option 2: Angelica (Dr. Garcia's Nurse): [phone]  2.   Plan to return to clinic virtually after MRI    3. Hearing Aid consult    4. MRI temporal    Angelica Grady LPN  [facility] - Otolaryngology     The patient presents with a history of asymmetric sensorineural hearing loss. He will be referred for an Audiology consultation for hearing amplification and an MRI scan of the head to evaluate the asymmetric sensorineural hearing loss. He will be referred for a temporomandibular joint disorder evaluation with our Dental Specialists and he will be seen again after these evaluations are completed.

## 2021-01-22 ENCOUNTER — OFFICE VISIT (OUTPATIENT)
Dept: FAMILY MEDICINE | Facility: CLINIC | Age: 66
End: 2021-01-22
Payer: COMMERCIAL

## 2021-01-22 ENCOUNTER — OFFICE VISIT (OUTPATIENT)
Dept: OTOLARYNGOLOGY | Facility: CLINIC | Age: 66
End: 2021-01-22
Payer: COMMERCIAL

## 2021-01-22 ENCOUNTER — MYC MEDICAL ADVICE (OUTPATIENT)
Dept: FAMILY MEDICINE | Facility: CLINIC | Age: 66
End: 2021-01-22

## 2021-01-22 ENCOUNTER — PRE VISIT (OUTPATIENT)
Dept: OTOLARYNGOLOGY | Facility: CLINIC | Age: 66
End: 2021-01-22

## 2021-01-22 VITALS
RESPIRATION RATE: 18 BRPM | HEART RATE: 65 BPM | SYSTOLIC BLOOD PRESSURE: 154 MMHG | OXYGEN SATURATION: 98 % | TEMPERATURE: 98.1 F | DIASTOLIC BLOOD PRESSURE: 85 MMHG | BODY MASS INDEX: 32.54 KG/M2 | WEIGHT: 223 LBS

## 2021-01-22 VITALS
HEART RATE: 85 BPM | RESPIRATION RATE: 18 BRPM | TEMPERATURE: 98.1 F | OXYGEN SATURATION: 99 % | BODY MASS INDEX: 33.03 KG/M2 | HEIGHT: 69 IN | WEIGHT: 223 LBS | SYSTOLIC BLOOD PRESSURE: 150 MMHG | DIASTOLIC BLOOD PRESSURE: 80 MMHG

## 2021-01-22 DIAGNOSIS — I10 HYPERTENSION WITH GOAL BLOOD PRESSURE LESS THAN 140/90: ICD-10-CM

## 2021-01-22 DIAGNOSIS — T88.52XA: Primary | ICD-10-CM

## 2021-01-22 DIAGNOSIS — R07.89 CHEST WALL PAIN: Primary | ICD-10-CM

## 2021-01-22 DIAGNOSIS — M26.609 TMJ (TEMPOROMANDIBULAR JOINT SYNDROME): ICD-10-CM

## 2021-01-22 DIAGNOSIS — H90.3 SNHL (SENSORY-NEURAL HEARING LOSS), ASYMMETRICAL: Primary | ICD-10-CM

## 2021-01-22 PROCEDURE — 99213 OFFICE O/P EST LOW 20 MIN: CPT | Performed by: NURSE PRACTITIONER

## 2021-01-22 PROCEDURE — 99203 OFFICE O/P NEW LOW 30 MIN: CPT | Performed by: OTOLARYNGOLOGY

## 2021-01-22 RX ORDER — ALPRAZOLAM 1 MG
1 TABLET ORAL
Qty: 2 TABLET | Refills: 0 | Status: SHIPPED | OUTPATIENT
Start: 2021-01-22 | End: 2021-09-16

## 2021-01-22 ASSESSMENT — MIFFLIN-ST. JEOR: SCORE: 1803.39

## 2021-01-22 ASSESSMENT — PAIN SCALES - GENERAL: PAINLEVEL: NO PAIN (0)

## 2021-01-22 NOTE — PATIENT INSTRUCTIONS
I recommend taking OTC ibuprofen for the next couple of days to help with inflammation and you may take as needed for pain for the next week. Rest, heat, and ice may also be helpful      Patient Education     Noncardiac Chest Pain    Based on your visit today, the healthcare provider doesn t know what is causing your chest pain. In most cases, people who come to the emergency room with chest pain don t have a problem with their heart. Instead, the pain is caused by other conditions. It's important for the healthcare team to be sure you are not having a life-threatening cause for chest pain such as:     Heart attack    Blood clot in the lungs    Collapsed lung    Ruptured esophagus    Tearing of the aorta  Once these major causes have been ruled out, you may have further evaluation for nonheart causes of chest pain. These may be problems with the lungs, muscles, bones, digestive tract, nerves, or mental health. They include:     Inflammation around the lungs (pleurisy)    Collapsed lung (pneumothorax)    Fluid around the lungs (pleural effusion)    Lung cancer (a rare cause of chest pain)    Inflamed cartilage between the ribs (costochondritis)    Fibromyalgia    Rheumatoid arthritis    Chest wall strain    Reflux    Stomach ulcer    Spasms of the esophagus    Gall stones    Gallbladder inflammation    Panic or anxiety attacks    Emotional distress  Your condition doesn t seem serious. And your pain doesn t seem to be coming from your heart. But sometimes the signs of a serious problem take more time to appear. Watch for the warning signs listed below.   Home care  Follow these guidelines when caring for yourself at home:     Rest today and don't do any strenuous activity.    Take any prescribed medicine as directed.  Follow-up care  Follow up with your healthcare provider, or as advised, if you don t start to feel better in 24 hours.   Call 911  Call 911 if any of these occur:     A change in the type of pain: if it  feels different, becomes more severe, lasts longer, or begins to spread into your shoulder, arm, neck, jaw or back    Shortness of breath or increased pain with breathing    Weakness, dizziness, or fainting    Rapid heart beat    Crushing sensation in your chest  When to seek medical advice  Call your healthcare provider right away if any of these occur:     Cough with dark colored sputum (phlegm) or blood    Fever of 100.4 F (38 C) or higher, or as directed by your healthcare provider    Swelling, pain or redness in one leg  Trudy last reviewed this educational content on 6/1/2019 2000-2020 [employer]. [address] [place] [address]. All rights reserved. This information is not intended as a substitute for professional medical care. Always follow your healthcare professional's instructions.

## 2021-01-22 NOTE — LETTER
1/22/2021       RE: Mitchell Randolph  [address] [facility] [address]     Dear Colleague,    Thank you for referring your patient, Mitchell Randolph, to the [facility] [place] at [facility]. Please see a copy of my visit note below.    The patient presents with a history of asymmetric sensorineural hearing loss. He reports that he has noticed this loss for some time, but only recently decided to explore further evaluation. He does notice that his right ear hearing is worse than his left ear. The patient denies sinusitis, rhinitis, facial pain, nasal obstruction or purulent nasal discharge. The patient denies chronic or recurrent tonsillitis, chronic or recurrent pharyngitis. The patient denies otalgia, otorrhea, eustachian tube dysfunction, ear infections or dizziness. He reports bilateral tinnitus. He notices a click in his left jaw joint when opening and closing his jaw.     His Audiogram and Tympanogram are reviewed with him and they demonstrate bilateral sloping sensorineural hearing loss that is worse in the right ear and mild to severe. His word recognition scores are 96% in the right ear and 100% in the left ear. His tympanograms are normal.        This patient is seen in consultation as a self referral to my clinic.     All other systems were reviewed and they are either negative or they are not directly pertinent to this Otolaryngology examination.      Past Medical History:    Past Medical History:   Diagnosis Date     High cholesterol      Hypertension        Past Surgical History:    Past Surgical History:   Procedure Laterality Date     ORTHOPEDIC SURGERY  Mar 2019     TONSILLECTOMY  age 6 or 7       Medications:      Current Outpatient Medications:      cetirizine (ZYRTEC) 10 MG tablet, Take 1 tablet (10 mg) by mouth daily Ear congestion (Patient not taking: Reported on 11/1/2019), Disp: 30 tablet, Rfl: 0     fluconazole  (DIFLUCAN) 200 MG tablet, Take 1 tablet (200 mg) by mouth daily 2 tablets as needed, repeat 2 tablets in 2 weeks (Patient not taking: Reported on 1/22/2021), Disp: 4 tablet, Rfl: 6     lisinopril (ZESTRIL) 20 MG tablet, TAKE 2 TABLETS BY MOUTH (40MG) DAILY, Disp: 180 tablet, Rfl: 3     simvastatin (ZOCOR) 40 MG tablet, Take 1 tablet (40 mg) by mouth At Bedtime Due for recheck in March 2017, Disp: 90 tablet, Rfl: 3    Allergies:    Patient has no known allergies.    Physical Examination:    The patient is a well developed, well nourished male in no apparent distress.  He is normocepahlic, atraumatic with pupils equally round and reactive to light.    Oral Cavity Examination: Normal Mucosa with no masses or lesions  Nasal Examination: Normal Mucosa with no masses or lesions  Ear Examination: Ear canals clear, tympanic membranes and middle ear spaces normal  Neurological Examination: Facial nerve function intact and symmetric  Integumentary Examination: No lesions on the skin of the head or neck  Neck Examination: No masses or lesions, no lymphadenopathy  Endocrine Examination: Normal thyroid examination  Temporomandibular Joint Examination: Malrotation of the temporomandibular joints bilaterally.     Assessment and Plan:    The patient presents with a history of asymmetric sensorineural hearing loss. He will be referred for an Audiology consultation for hearing amplification and an MRI scan of the head to evaluate the asymmetric sensorineural hearing loss. He will be referred for a temporomandibular joint disorder evaluation with our Dental Specialists and he will be seen again after these evaluations are completed.     Again, thank you for allowing me to participate in the care of your patient.      Sincerely,    Gonsalo Garcia MD

## 2021-01-22 NOTE — NURSING NOTE
Chief Complaint   Patient presents with     Consult     follow up     Blood pressure (!) 154/85, pulse 65, temperature 98.1  F (36.7  C), resp. rate 18, weight 101.2 kg (223 lb), SpO2 98 %.    Jonathan Lamb LPN

## 2021-01-22 NOTE — PROGRESS NOTES
The patient presents with a history of asymmetric sensorineural hearing loss. He reports that he has noticed this loss for some time, but only recently decided to explore further evaluation. He does notice that his right ear hearing is worse than his left ear. The patient denies sinusitis, rhinitis, facial pain, nasal obstruction or purulent nasal discharge. The patient denies chronic or recurrent tonsillitis, chronic or recurrent pharyngitis. The patient denies otalgia, otorrhea, eustachian tube dysfunction, ear infections or dizziness. He reports bilateral tinnitus. He notices a click in his left jaw joint when opening and closing his jaw.     His Audiogram and Tympanogram are reviewed with him and they demonstrate bilateral sloping sensorineural hearing loss that is worse in the right ear and mild to severe. His word recognition scores are 96% in the right ear and 100% in the left ear. His tympanograms are normal.        This patient is seen in consultation as a self referral to my clinic.     All other systems were reviewed and they are either negative or they are not directly pertinent to this Otolaryngology examination.      Past Medical History:    Past Medical History:   Diagnosis Date     High cholesterol      Hypertension        Past Surgical History:    Past Surgical History:   Procedure Laterality Date     ORTHOPEDIC SURGERY  Mar 2019     TONSILLECTOMY  age 6 or 7       Medications:      Current Outpatient Medications:      cetirizine (ZYRTEC) 10 MG tablet, Take 1 tablet (10 mg) by mouth daily Ear congestion (Patient not taking: Reported on 11/1/2019), Disp: 30 tablet, Rfl: 0     fluconazole (DIFLUCAN) 200 MG tablet, Take 1 tablet (200 mg) by mouth daily 2 tablets as needed, repeat 2 tablets in 2 weeks (Patient not taking: Reported on 1/22/2021), Disp: 4 tablet, Rfl: 6     lisinopril (ZESTRIL) 20 MG tablet, TAKE 2 TABLETS BY MOUTH (40MG) DAILY, Disp: 180 tablet, Rfl: 3     simvastatin (ZOCOR) 40 MG  tablet, Take 1 tablet (40 mg) by mouth At Bedtime Due for recheck in March 2017, Disp: 90 tablet, Rfl: 3    Allergies:    Patient has no known allergies.    Physical Examination:    The patient is a well developed, well nourished male in no apparent distress.  He is normocepahlic, atraumatic with pupils equally round and reactive to light.    Oral Cavity Examination: Normal Mucosa with no masses or lesions  Nasal Examination: Normal Mucosa with no masses or lesions  Ear Examination: Ear canals clear, tympanic membranes and middle ear spaces normal  Neurological Examination: Facial nerve function intact and symmetric  Integumentary Examination: No lesions on the skin of the head or neck  Neck Examination: No masses or lesions, no lymphadenopathy  Endocrine Examination: Normal thyroid examination  Temporomandibular Joint Examination: Malrotation of the temporomandibular joints bilaterally.     Assessment and Plan:    The patient presents with a history of asymmetric sensorineural hearing loss. He will be referred for an Audiology consultation for hearing amplification and an MRI scan of the head to evaluate the asymmetric sensorineural hearing loss. He will be referred for a temporomandibular joint disorder evaluation with our [facility] and he will be seen again after these evaluations are completed.

## 2021-01-22 NOTE — PROGRESS NOTES
Assessment & Plan     Chest wall pain  Likely musculoskeletal, as the pain is improving with time and he denies other respiratory or cardiac concerns.  Counseled on self-care measures including: ice/heat, OTC pain medications (ibuprofen for the next two days, then as needed); and warning signs of when to seek urgent medical care including: shortness of breath, cough, or fever.    Hypertension with goal blood pressure less than 140/90  Recommend following up with PCP to address blood pressure.  Offered to discuss today, but patient declined.      Return in about 1 week (around 1/29/2021), or if symptoms worsen or fail to improve, for and follow up with your PCP to discuss your high blood pressure.    Geetha Anderson, DNP/FNP Student, [facility]    I very much appreciated the opportunity to see and assess this patient in the clinic with NP student.  I agree with the above note which summarizes my findings and current recommendations. I have reviewed all diagnostics noted and performed physical exam. Changes were made in the body of the note to achieve one comprehensive document.    CHEKO Fung [facility] ANT Medrano is a 65 year old who presents to clinic today for the following health issues  accompanied by his self:    HPI       Concern - Patient presents with:  Pain: having pain in the left rib cage since last week. in the past 24 hours it have been getting better      Onset: 1 week ago  Description: pain in the left rib cage since last week  Intensity: currently 2/10, at worse 7-8/10  Progression of Symptoms:  Improving in the past 24 hours   Accompanying Signs & Symptoms: none  Previous history of similar problem: yes back in the 80s or 90s - he had a work-up at [place] at the time, but doesn't remember any specific result.  He does not think any cardiac or respiratory source was identified  Precipitating factors:        Worsened by: sometimes with  "certain movements  Alleviating factors:        Improved by: Tylenol   Therapies tried and outcome: Tylenol - maybe makes the pain a little less severe, but the episodes are so intermittent it is hard to tell      Mitchell has sharp, intermittent pain on the lateral side of his left chest at the level of his 9th rib.  No known trauma to the area.  The pain started several days after shoveling heavy snow, but he does not remember any particular \"catch\" or difficulty while shoveling.  Denies fever, cough, shortness of breath, or urinary changes.  No history of kidney stones. The pain does not seem to be associated with taking a deep breath.  If anything, it occurs when he moves, although he is not able to predict what movements will trigger the pain.      Review of Systems   Constitutional, HEENT, cardiovascular, pulmonary, gi and gu systems are negative, except as otherwise noted.      Objective    BP (!) 150/80   Pulse 85   Temp 98.1  F (36.7  C) (Oral)   Resp 18   Ht 1.763 m (5' 9.41\")   Wt 101.2 kg (223 lb)   SpO2 99%   BMI 32.54 kg/m    Body mass index is 32.54 kg/m .  Physical Exam   GENERAL: healthy, alert and no distress  RESP: lungs clear to auscultation - no rales, rhonchi or wheezes  CV: regular rate and rhythm, normal S1 S2, no S3 or S4, no murmur, click or rub, no peripheral edema and peripheral pulses strong  ABDOMEN: soft, nontender, no hepatosplenomegaly, no masses and bowel sounds normal  MS: no gross musculoskeletal defects noted, no edema, unable to reproduce pain with palpation  BACK: no CVA tenderness, no paralumbar tenderness          "

## 2021-02-01 ENCOUNTER — ANCILLARY PROCEDURE (OUTPATIENT)
Dept: MRI IMAGING | Facility: CLINIC | Age: 66
End: 2021-02-01
Attending: OTOLARYNGOLOGY
Payer: COMMERCIAL

## 2021-02-01 DIAGNOSIS — H90.3 SNHL (SENSORY-NEURAL HEARING LOSS), ASYMMETRICAL: ICD-10-CM

## 2021-02-01 PROCEDURE — A9585 GADOBUTROL INJECTION: HCPCS | Performed by: RADIOLOGY

## 2021-02-01 PROCEDURE — 70553 MRI BRAIN STEM W/O & W/DYE: CPT | Mod: GC | Performed by: RADIOLOGY

## 2021-02-01 RX ORDER — GADOBUTROL 604.72 MG/ML
10 INJECTION INTRAVENOUS ONCE
Status: COMPLETED | OUTPATIENT
Start: 2021-02-01 | End: 2021-02-01

## 2021-02-01 RX ADMIN — GADOBUTROL 10 ML: 604.72 INJECTION INTRAVENOUS at 11:45

## 2021-02-01 NOTE — DISCHARGE INSTRUCTIONS
MRI Contrast Discharge Instructions    The IV contrast you received today will pass out of your body in your  urine. This will happen in the next 24 hours. You will not feel this process.  Your urine will not change color.    Drink at least 4 extra glasses of water or juice today (unless your doctor  has restricted your fluids). This reduces the stress on your kidneys.  You may take your regular medicines.    If you are on dialysis: It is best to have dialysis today.    If you have a reaction: Most reactions happen right away. If you have  any new symptoms after leaving the hospital (such as hives or swelling),  call your hospital at the correct number below. Or call your family doctor.  If you have breathing distress or wheezing, call 911.    Special instructions: ***    I have read and understand the above information.    Signature:______________________________________ Date:___________    Staff:__________________________________________ Date:___________     Time:__________    [facility] Departments:    ___[place]: [phone]  ___[facility]: [phone]  ___[place]: [phone] ___[facility]: [phone]  ___[facility]: [phone]  ___[facility]: [phone]  ___[phone]  ___[facility]: [phone]  ___[phone]

## 2021-02-05 ENCOUNTER — OFFICE VISIT (OUTPATIENT)
Dept: AUDIOLOGY | Facility: CLINIC | Age: 66
End: 2021-02-05
Payer: COMMERCIAL

## 2021-02-05 DIAGNOSIS — H90.3 SENSORINEURAL HEARING LOSS, ASYMMETRICAL: Primary | ICD-10-CM

## 2021-02-05 DIAGNOSIS — H90.3 SNHL (SENSORY-NEURAL HEARING LOSS), ASYMMETRICAL: ICD-10-CM

## 2021-02-05 PROCEDURE — 92591 PR HEARING AID EXAM BINAURAL: CPT | Performed by: AUDIOLOGIST

## 2021-02-05 NOTE — PROGRESS NOTES
AUDIOLOGY REPORT    SUBJECTIVE: Mitchell Randolph is a 65 year old male was seen in the Audiology Clinic at  [facility] and [facility] on 2/05/21 to discuss concerns with hearing and functional communication difficulties.Mitchell has been seen previously on 11/4/2020, and results revealed a bilateral asymmetric sensorineural hearing loss. The patient was medically evaluated and determined to be cleared for binaural hearing aids by Gonsalo Garcia MD. Mitchell notes difficulty with communication in a variety of listening situations.    OBJECTIVE:  Patient is a hearing aid candidate. Patient would like to move forward with a hearing aid evaluation today. Therefore, the patient was presented with different options for amplification to help aid in communication. Discussed styles, levels of technology and monaural vs. binaural fitting.     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 He reports difficulty hearing on the factory floor and in meetings where he is 10 feet away from the speaker. He uses a headset for phone meetings and so does not think he is interested in direct streaming. He is interested in a phone lana (currently uses Android phone). He is unsure about rechargeable or disposable batteries at this time. He leads an active lifestyle and enjoys biking and walking. He reports that he initially came in thinking he would want an ITE/ITC style but would be open to a RITE style of hearing aid after discussing pros and cons of the different styles.. He wanted to talk to his family members before making a decision and would like a quote for both ITC and RTE styles.    The hearing aids quoted were  Binaural: Phonak Audeo P50  BATTERY SIZE: rechargeable or 312  EARMOLD/TIPS: medium open  CANAL/ LENGTH: 1    Binaural: Phonak Virto M50  COLOR: Pink  BATTERY SIZE: 312    Binaural: Unitron Aura2 (unsure of technology level)  BATTERY SIZE:  312 or rechargeable  EARMOLD/TIPS: medium open  CANAL/ LENGTH: 1    It was reviewed with the patient that if he proceeded with a custom hearing aid, he would need to return for an earmold impression. He expressed understanding and was in agreement with the plan.      ASSESSMENT:     ICD-10-CM    1. SNHL (sensory-neural hearing loss), asymmetrical  H90.5 AUDIOLOGY ADULT REFERRAL       Reviewed purchase information and warranty information with patient. The 45 day trial period was explained to patient. The patient was given a copy of the [place] Department of Health consumer brochure on purchasing hearing instruments. Patient risk factors have been provided to the patient in writing prior to the sale of the hearing aid per FDA regulation. The risk factors are also available in the User Instructional Booklet to be presented on the day of the hearing aid fitting. Hearing aid evaluation completed.    PLAN: Mitchell will contact the clinic should he decide to proceed with hearing aids.The appropriate appointments will be scheduled once he lets the clinic know if he wants to proceed. Please contact this clinic with any questions or concerns.      Carla Valdez  Audiologist  [place] License  #[id number]

## 2021-02-15 NOTE — PATIENT INSTRUCTIONS
1. You were seen in the ENT Clinic today by Dr. Garcia.  If you have any questions or concerns after your appointment, please call   - Option 1: ENT Clinic: [phone]   - Option 2: Angelica (Dr. Garcia's Nurse): [phone]    2.   Plan to return to clinic in 1 year with hearing test    Angelica Grady LPN  [facility] - Otolaryngology    The patient presents with a history of asymmetric sensorineural hearing loss. He was referred for an Audiology consultation for hearing amplification and an MRI scan of the head to evaluate the asymmetric sensorineural hearing loss. He was referred for a temporomandibular joint disorder evaluation with our [facility]. His MRI scan demonstrates no retrocochlear pathology or intracranial pathology. He will proceed with his hearing aid evaluation and he will proceed with his temporomandibular joint disorder evaluation and management.

## 2021-02-25 ENCOUNTER — OFFICE VISIT (OUTPATIENT)
Dept: FAMILY MEDICINE | Facility: CLINIC | Age: 66
End: 2021-02-25
Payer: COMMERCIAL

## 2021-02-25 ENCOUNTER — MYC MEDICAL ADVICE (OUTPATIENT)
Dept: FAMILY MEDICINE | Facility: CLINIC | Age: 66
End: 2021-02-25

## 2021-02-25 VITALS
OXYGEN SATURATION: 98 % | TEMPERATURE: 97.9 F | SYSTOLIC BLOOD PRESSURE: 125 MMHG | DIASTOLIC BLOOD PRESSURE: 63 MMHG | HEART RATE: 59 BPM | BODY MASS INDEX: 33.03 KG/M2 | HEIGHT: 69 IN | WEIGHT: 223 LBS

## 2021-02-25 DIAGNOSIS — I10 HYPERTENSION WITH GOAL BLOOD PRESSURE LESS THAN 140/90: Primary | ICD-10-CM

## 2021-02-25 DIAGNOSIS — I10 HYPERTENSION WITH GOAL BLOOD PRESSURE LESS THAN 140/90: ICD-10-CM

## 2021-02-25 PROCEDURE — 99213 OFFICE O/P EST LOW 20 MIN: CPT | Performed by: INTERNAL MEDICINE

## 2021-02-25 RX ORDER — LISINOPRIL 40 MG/1
40 TABLET ORAL DAILY
Qty: 90 TABLET | Refills: 4 | Status: SHIPPED | OUTPATIENT
Start: 2021-02-25 | End: 2021-02-26

## 2021-02-25 ASSESSMENT — MIFFLIN-ST. JEOR: SCORE: 1803.25

## 2021-02-25 NOTE — PROGRESS NOTES
"Assessment & Plan   Problem List Items Addressed This Visit     Hypertension with goal blood pressure less than 140/90 - Primary                    BMI:   Estimated body mass index is 32.55 kg/m  as calculated from the following:    Height as of this encounter: 1.763 m (5' 9.4\").    Weight as of this encounter: 101.2 kg (223 lb).   Weight management plan: Discussed healthy diet and exercise guidelines    Regular exercise    No follow-ups on file.    Jey Joya MD  [facility] ANT Medrano is a 65 year old who presents for the following health issues    HPI     Hypertension Follow-up      Do you check your blood pressure regularly outside of the clinic? Yes     Are you following a low salt diet? Yes    Are your blood pressures ever more than 140 on the top number (systolic) OR more   than 90 on the bottom number (diastolic), for example 140/90? Yes      How many servings of fruits and vegetables do you eat daily?  2-3    On average, how many sweetened beverages do you drink each day (Examples: soda, juice, sweet tea, etc.  Do NOT count diet or artificially sweetened beverages)?   0    How many days per week do you exercise enough to make your heart beat faster? 3 or less    How many minutes a day do you exercise enough to make your heart beat faster? 10 - 19    How many days per week do you miss taking your medication? 0          Review of Systems   Constitutional, HEENT, cardiovascular, pulmonary, gi and gu systems are negative, except as otherwise noted.      Objective    /63 (BP Location: Right arm, Patient Position: Chair, Cuff Size: Adult Regular)   Pulse 59   Temp 97.9  F (36.6  C)   Ht 1.763 m (5' 9.4\")   Wt 101.2 kg (223 lb)   SpO2 98%   BMI 32.55 kg/m    Body mass index is 32.55 kg/m .  Physical Exam   GENERAL: healthy, alert and no distress  EYES: Eyes grossly normal to inspection, PERRL and conjunctivae and sclerae normal  HENT: ear canals and TM's normal, " nose and mouth without ulcers or lesions  NECK: no adenopathy, no asymmetry, masses, or scars and thyroid normal to palpation  RESP: lungs clear to auscultation - no rales, rhonchi or wheezes  CV: regular rate and rhythm, normal S1 S2, no S3 or S4, no murmur, click or rub, no peripheral edema and peripheral pulses strong  ABDOMEN: soft, nontender, no hepatosplenomegaly, no masses and bowel sounds normal  MS: no gross musculoskeletal defects noted, no edema  SKIN: no suspicious lesions or rashes  NEURO: Normal strength and tone, mentation intact and speech normal  BACK: no CVA tenderness, no paralumbar tenderness  PSYCH: mentation appears normal, affect normal/bright  LYMPH: no cervical, supraclavicular, axillary, or inguinal adenopathy    No results found for any visits on 02/25/21.

## 2021-02-26 ENCOUNTER — VIRTUAL VISIT (OUTPATIENT)
Dept: OTOLARYNGOLOGY | Facility: CLINIC | Age: 66
End: 2021-02-26
Payer: COMMERCIAL

## 2021-02-26 VITALS — WEIGHT: 223 LBS | BODY MASS INDEX: 33.03 KG/M2 | HEIGHT: 69 IN

## 2021-02-26 DIAGNOSIS — H93.13 TINNITUS, BILATERAL: ICD-10-CM

## 2021-02-26 DIAGNOSIS — H90.3 SNHL (SENSORY-NEURAL HEARING LOSS), ASYMMETRICAL: ICD-10-CM

## 2021-02-26 DIAGNOSIS — M26.609 TMJ (TEMPOROMANDIBULAR JOINT SYNDROME): Primary | ICD-10-CM

## 2021-02-26 PROCEDURE — 99213 OFFICE O/P EST LOW 20 MIN: CPT | Mod: TEL | Performed by: OTOLARYNGOLOGY

## 2021-02-26 RX ORDER — LISINOPRIL 40 MG/1
40 TABLET ORAL DAILY
Qty: 90 TABLET | Refills: 4 | Status: SHIPPED | OUTPATIENT
Start: 2021-02-26 | End: 2022-03-25

## 2021-02-26 ASSESSMENT — PAIN SCALES - GENERAL: PAINLEVEL: NO PAIN (0)

## 2021-02-26 ASSESSMENT — MIFFLIN-ST. JEOR: SCORE: 1796.9

## 2021-02-26 NOTE — PROGRESS NOTES
The patient presents with a history of asymmetric sensorineural hearing loss. He reports that he has noticed this loss for some time, but only recently decided to explore further evaluation. He does notice that his right ear hearing is worse than his left ear.  He reports bilateral tinnitus. He notices a click in his left jaw joint when opening and closing his jaw.     His Audiogram and Tympanogram are reviewed with him and they demonstrate bilateral sloping sensorineural hearing loss that is worse in the right ear and mild to severe. His word recognition scores are 96% in the right ear and 100% in the left ear. His tympanograms are normal.      He was referred for an Audiology consultation for hearing amplification and an MRI scan of the head to evaluate the asymmetric sensorineural hearing loss. He was referred for a temporomandibular joint disorder evaluation with our [facility].    His MRI scan demonstrated:    Impression: In this 65-year-old male patient with a history of  asymptomatic sensorineural hearing loss (right worse than left):   Normal appearance of the bilateral vestibulocochlear nerves, tympanic  cavities, and internal auditory canals without masses or abnormal  enhancement.    All other systems were reviewed and they are either negative or they are not directly pertinent to this Otolaryngology examination.      Past Medical History:    Past Medical History:   Diagnosis Date     High cholesterol      Hypertension        Past Surgical History:    Past Surgical History:   Procedure Laterality Date     ORTHOPEDIC SURGERY  Mar 2019     TONSILLECTOMY  age 6 or 7       Medications:      Current Outpatient Medications:      ALPRAZolam (XANAX) 1 MG tablet, Take 1 tablet (1 mg) by mouth once as needed for anxiety, Disp: 2 tablet, Rfl: 0     cetirizine (ZYRTEC) 10 MG tablet, Take 1 tablet (10 mg) by mouth daily Ear congestion, Disp: 30 tablet, Rfl: 0     fluconazole (DIFLUCAN) 200 MG tablet, Take 1  tablet (200 mg) by mouth daily 2 tablets as needed, repeat 2 tablets in 2 weeks, Disp: 4 tablet, Rfl: 6     lisinopril (ZESTRIL) 40 MG tablet, Take 1 tablet (40 mg) by mouth daily, Disp: 90 tablet, Rfl: 4     simvastatin (ZOCOR) 40 MG tablet, Take 1 tablet (40 mg) by mouth At Bedtime Due for recheck in March 2017, Disp: 90 tablet, Rfl: 3    Allergies:    Patient has no known allergies.    Assessment and Plan:    The patient presents with a history of asymmetric sensorineural hearing loss. He was referred for an Audiology consultation for hearing amplification and an MRI scan of the head to evaluate the asymmetric sensorineural hearing loss. He was referred for a temporomandibular joint disorder evaluation with our [facility]. His MRI scan demonstrates no retrocochlear pathology or intracranial pathology. He will proceed with his hearing aid evaluation and he will proceed with his temporomandibular joint disorder evaluation and management.

## 2021-02-26 NOTE — LETTER
2/26/2021       RE: Mitchell Randolph  [address] [facility] [address]     Dear Colleague,    Thank you for referring your patient, Mitchell Randolph, to the [facility] [place] [facility]. Please see a copy of my visit note below.    The patient presents with a history of asymmetric sensorineural hearing loss. He reports that he has noticed this loss for some time, but only recently decided to explore further evaluation. He does notice that his right ear hearing is worse than his left ear.  He reports bilateral tinnitus. He notices a click in his left jaw joint when opening and closing his jaw.     His Audiogram and Tympanogram are reviewed with him and they demonstrate bilateral sloping sensorineural hearing loss that is worse in the right ear and mild to severe. His word recognition scores are 96% in the right ear and 100% in the left ear. His tympanograms are normal.      He was referred for an Audiology consultation for hearing amplification and an MRI scan of the head to evaluate the asymmetric sensorineural hearing loss. He was referred for a temporomandibular joint disorder evaluation with our Dental [facility].    His MRI scan demonstrated:    Impression: In this 65-year-old male patient with a history of  asymptomatic sensorineural hearing loss (right worse than left):   Normal appearance of the bilateral vestibulocochlear nerves, tympanic  cavities, and internal auditory canals without masses or abnormal  enhancement.    All other systems were reviewed and they are either negative or they are not directly pertinent to this Otolaryngology examination.      Past Medical History:    Past Medical History:   Diagnosis Date     High cholesterol      Hypertension        Past Surgical History:    Past Surgical History:   Procedure Laterality Date     ORTHOPEDIC SURGERY  Mar 2019     TONSILLECTOMY  age 6 or 7        Medications:      Current Outpatient Medications:      ALPRAZolam (XANAX) 1 MG tablet, Take 1 tablet (1 mg) by mouth once as needed for anxiety, Disp: 2 tablet, Rfl: 0     cetirizine (ZYRTEC) 10 MG tablet, Take 1 tablet (10 mg) by mouth daily Ear congestion, Disp: 30 tablet, Rfl: 0     fluconazole (DIFLUCAN) 200 MG tablet, Take 1 tablet (200 mg) by mouth daily 2 tablets as needed, repeat 2 tablets in 2 weeks, Disp: 4 tablet, Rfl: 6     lisinopril (ZESTRIL) 40 MG tablet, Take 1 tablet (40 mg) by mouth daily, Disp: 90 tablet, Rfl: 4     simvastatin (ZOCOR) 40 MG tablet, Take 1 tablet (40 mg) by mouth At Bedtime Due for recheck in March 2017, Disp: 90 tablet, Rfl: 3    Allergies:    Patient has no known allergies.    Assessment and Plan:    The patient presents with a history of asymmetric sensorineural hearing loss. He was referred for an Audiology consultation for hearing amplification and an MRI scan of the head to evaluate the asymmetric sensorineural hearing loss. He was referred for a temporomandibular joint disorder evaluation with our [facility]. His MRI scan demonstrates no retrocochlear pathology or intracranial pathology. He will proceed with his hearing aid evaluation and he will proceed with his temporomandibular joint disorder evaluation and management.         iMtchell is a 65 year old who is being evaluated via a billable telephone visit.      What phone number would you like to be contacted at? [phone]  How would you like to obtain your AVS? Nithin Perry   Mitchell is a 65 year old who presents for the following health issues: asymmetric sensorineural hearing loss.     The patient presents with a history of asymmetric sensorineural hearing loss. He reports that he has noticed this loss for some time, but only recently decided to explore further evaluation. He does notice that his right ear hearing is worse than his left ear. The patient denies otalgia, otorrhea, eustachian tube  dysfunction, ear infections or dizziness. He reports bilateral tinnitus. He notices a click in his left jaw joint when opening and closing his jaw.     His Audiogram and Tympanogram are reviewed with him and they demonstrate bilateral sloping sensorineural hearing loss that is worse in the right ear and mild to severe. His word recognition scores are 96% in the right ear and 100% in the left ear. His tympanograms are normal.      His MRI scan demonstrated:     Impression: In this 65-year-old male patient with a history of  asymptomatic sensorineural hearing loss (right worse than left):   Normal appearance of the bilateral vestibulocochlear nerves, tympanic  cavities, and internal auditory canals without masses or abnormal  enhancement.      All other systems were reviewed and they are either negative or they are not directly pertinent to this Otolaryngology examination.      Past Medical History:    Past Medical History:   Diagnosis Date     High cholesterol      Hypertension        Past Surgical History:    Past Surgical History:   Procedure Laterality Date     ORTHOPEDIC SURGERY  Mar 2019     TONSILLECTOMY  age 6 or 7       Medications:      Current Outpatient Medications:      lisinopril (ZESTRIL) 40 MG tablet, Take 1 tablet (40 mg) by mouth daily, Disp: 90 tablet, Rfl: 4     simvastatin (ZOCOR) 40 MG tablet, Take 1 tablet (40 mg) by mouth At Bedtime Due for recheck in March 2017, Disp: 90 tablet, Rfl: 3     ALPRAZolam (XANAX) 1 MG tablet, Take 1 tablet (1 mg) by mouth once as needed for anxiety (Patient not taking: Reported on 2/26/2021), Disp: 2 tablet, Rfl: 0     fluconazole (DIFLUCAN) 200 MG tablet, Take 1 tablet (200 mg) by mouth daily 2 tablets as needed, repeat 2 tablets in 2 weeks (Patient not taking: Reported on 2/26/2021), Disp: 4 tablet, Rfl: 6    Allergies:    Patient has no known allergies.    Assessment and Plan:    The patient presents with a history of asymmetric sensorineural hearing loss. He  was referred for an Audiology consultation for hearing amplification and an MRI scan of the head to evaluate the asymmetric sensorineural hearing loss. He was referred for a temporomandibular joint disorder evaluation with our [facility]. His MRI scan demonstrated no retrocochlear pathology or intracranial pathology. He will proceed with his temporomandibular joint disorder evaluation with our [facility] and he will proceed with his hearing aid consultation with our Audiology specialists. He will be seen again in one year to review a repeat of his Audiogram and Tympanogram.     Phone call duration: 15 minutes      Again, thank you for allowing me to participate in the care of your patient.      Sincerely,    Gonsalo Garcia MD

## 2021-02-26 NOTE — PROGRESS NOTES
Mitchell is a 65 year old who is being evaluated via a billable telephone visit.      What phone number would you like to be contacted at? [phone]  How would you like to obtain your AVS? Nithin        Orlando Medrano is a 65 year old who presents for the following health issues: asymmetric sensorineural hearing loss.     The patient presents with a history of asymmetric sensorineural hearing loss. He reports that he has noticed this loss for some time, but only recently decided to explore further evaluation. He does notice that his right ear hearing is worse than his left ear. The patient denies otalgia, otorrhea, eustachian tube dysfunction, ear infections or dizziness. He reports bilateral tinnitus. He notices a click in his left jaw joint when opening and closing his jaw.     His Audiogram and Tympanogram are reviewed with him and they demonstrate bilateral sloping sensorineural hearing loss that is worse in the right ear and mild to severe. His word recognition scores are 96% in the right ear and 100% in the left ear. His tympanograms are normal.      His MRI scan demonstrated:     Impression: In this 65-year-old male patient with a history of  asymptomatic sensorineural hearing loss (right worse than left):   Normal appearance of the bilateral vestibulocochlear nerves, tympanic  cavities, and internal auditory canals without masses or abnormal  enhancement.      All other systems were reviewed and they are either negative or they are not directly pertinent to this Otolaryngology examination.      Past Medical History:    Past Medical History:   Diagnosis Date     High cholesterol      Hypertension        Past Surgical History:    Past Surgical History:   Procedure Laterality Date     ORTHOPEDIC SURGERY  Mar 2019     TONSILLECTOMY  age 6 or 7       Medications:      Current Outpatient Medications:      lisinopril (ZESTRIL) 40 MG tablet, Take 1 tablet (40 mg) by mouth daily, Disp: 90 tablet, Rfl: 4      simvastatin (ZOCOR) 40 MG tablet, Take 1 tablet (40 mg) by mouth At Bedtime Due for recheck in March 2017, Disp: 90 tablet, Rfl: 3     ALPRAZolam (XANAX) 1 MG tablet, Take 1 tablet (1 mg) by mouth once as needed for anxiety (Patient not taking: Reported on 2/26/2021), Disp: 2 tablet, Rfl: 0     fluconazole (DIFLUCAN) 200 MG tablet, Take 1 tablet (200 mg) by mouth daily 2 tablets as needed, repeat 2 tablets in 2 weeks (Patient not taking: Reported on 2/26/2021), Disp: 4 tablet, Rfl: 6    Allergies:    Patient has no known allergies.    Assessment and Plan:    The patient presents with a history of asymmetric sensorineural hearing loss. He was referred for an Audiology consultation for hearing amplification and an MRI scan of the head to evaluate the asymmetric sensorineural hearing loss. He was referred for a temporomandibular joint disorder evaluation with our [facility]. His MRI scan demonstrated no retrocochlear pathology or intracranial pathology. He will proceed with his temporomandibular joint disorder evaluation with our Dental [facility] and he will proceed with his hearing aid consultation with our Audiology specialists. He will be seen again in one year to review a repeat of his Audiogram and Tympanogram.           Phone call duration: 15 minutes

## 2021-03-04 ENCOUNTER — MYC MEDICAL ADVICE (OUTPATIENT)
Dept: FAMILY MEDICINE | Facility: CLINIC | Age: 66
End: 2021-03-04

## 2021-03-04 DIAGNOSIS — I10 HYPERTENSION WITH GOAL BLOOD PRESSURE LESS THAN 140/90: Primary | ICD-10-CM

## 2021-03-05 NOTE — TELEPHONE ENCOUNTER
See patient message with BP readings.  Patient interested in home BP monitor DME order pending.   China Aguirre RN

## 2021-03-05 NOTE — TELEPHONE ENCOUNTER
Called and spoke with Mitchell. Mailed out the script to the confirmed address.    [address] [place] [id number]    Patient will contact the clinic if more is needed    Brisa Thao,

## 2021-03-17 ENCOUNTER — MYC MEDICAL ADVICE (OUTPATIENT)
Dept: FAMILY MEDICINE | Facility: CLINIC | Age: 66
End: 2021-03-17

## 2021-04-05 ENCOUNTER — IMMUNIZATION (OUTPATIENT)
Dept: PEDIATRICS | Facility: CLINIC | Age: 66
End: 2021-04-05
Payer: COMMERCIAL

## 2021-04-05 PROCEDURE — 0001A PR COVID VAC PFIZER DIL RECON 30 MCG/0.3 ML IM: CPT

## 2021-04-05 PROCEDURE — 91300 PR COVID VAC PFIZER DIL RECON 30 MCG/0.3 ML IM: CPT

## 2021-04-07 ENCOUNTER — MYC MEDICAL ADVICE (OUTPATIENT)
Dept: FAMILY MEDICINE | Facility: CLINIC | Age: 66
End: 2021-04-07

## 2021-04-26 ENCOUNTER — IMMUNIZATION (OUTPATIENT)
Dept: PEDIATRICS | Facility: CLINIC | Age: 66
End: 2021-04-26
Attending: INTERNAL MEDICINE
Payer: COMMERCIAL

## 2021-04-26 PROCEDURE — 0002A PR COVID VAC PFIZER DIL RECON 30 MCG/0.3 ML IM: CPT

## 2021-04-26 PROCEDURE — 91300 PR COVID VAC PFIZER DIL RECON 30 MCG/0.3 ML IM: CPT

## 2021-04-27 ENCOUNTER — MYC MEDICAL ADVICE (OUTPATIENT)
Dept: FAMILY MEDICINE | Facility: CLINIC | Age: 66
End: 2021-04-27
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
## 2021-04-28 RX ORDER — DOXYCYCLINE 100 MG/1
100 TABLET ORAL 2 TIMES DAILY
Qty: 40 TABLET | Refills: 0 | Status: SHIPPED | OUTPATIENT
Start: 2021-04-28 | End: 2021-05-18

## 2021-04-28 NOTE — TELEPHONE ENCOUNTER
Routing [employer] message for provider to review blood pressure readings and advise on whether patient needs to continue to monitor. If so, for how long.    Sushma MONTERO RN, BSN  [facility]

## 2021-04-28 NOTE — TELEPHONE ENCOUNTER
Patient called the clinic to verify that the prescription for doxycycline monohydrate (ADOXA) 100 MG tablet was sent to the local [facility] pharmacy in [facility].  Patient verbalized understanding and has no further questions or concerns at this time.

## 2021-05-03 ENCOUNTER — ANCILLARY PROCEDURE (OUTPATIENT)
Dept: CARDIOLOGY | Facility: CLINIC | Age: 66
End: 2021-05-03
Attending: INTERNAL MEDICINE
Payer: COMMERCIAL

## 2021-05-03 DIAGNOSIS — I44.7 LBBB (LEFT BUNDLE BRANCH BLOCK): ICD-10-CM

## 2021-05-03 PROCEDURE — 99207 PR STATISTIC IV PUSH SINGLE INITIAL SUBSTANCE: CPT | Performed by: INTERNAL MEDICINE

## 2021-05-03 PROCEDURE — 93306 TTE W/DOPPLER COMPLETE: CPT | Performed by: INTERNAL MEDICINE

## 2021-05-03 RX ADMIN — Medication 7 ML: at 16:37

## 2021-05-31 ENCOUNTER — MYC MEDICAL ADVICE (OUTPATIENT)
Dept: FAMILY MEDICINE | Facility: CLINIC | Age: 66
End: 2021-05-31

## 2021-08-27 ENCOUNTER — MYC MEDICAL ADVICE (OUTPATIENT)
Dept: FAMILY MEDICINE | Facility: CLINIC | Age: 66
End: 2021-08-27

## 2021-08-27 DIAGNOSIS — E78.5 HYPERLIPIDEMIA LDL GOAL <130: ICD-10-CM

## 2021-08-30 RX ORDER — SIMVASTATIN 40 MG
40 TABLET ORAL AT BEDTIME
Qty: 90 TABLET | Refills: 0 | Status: SHIPPED | OUTPATIENT
Start: 2021-08-30 | End: 2021-09-16

## 2021-08-30 NOTE — TELEPHONE ENCOUNTER
Pending Prescriptions:                       Disp   Refills    simvastatin (ZOCOR) 40 MG tablet          90 tab*3            Sig: Take 1 tablet (40 mg) by mouth At Bedtime Due for           recheck in March 2017    Routing to RN refill anny Beauchamp MA

## 2021-08-30 NOTE — TELEPHONE ENCOUNTER
"Prescription approved per [facility] Refill Protocol.    Requested Prescriptions   Pending Prescriptions Disp Refills     simvastatin (ZOCOR) 40 MG tablet 90 tablet 3     Sig: Take 1 tablet (40 mg) by mouth At Bedtime Due for recheck in March 2017       [facility] Protocol Passed - 8/30/2021  2:13 PM        Passed - LDL on file in past 12 months     Recent Labs   Lab Test 09/04/20  1157   *             Passed - No abnormal creatine kinase in past 12 months     Recent Labs   Lab Test 04/04/19  0929                   Passed - Recent (12 mo) or future (30 days) visit within the authorizing provider's specialty     Patient has had an office visit with the authorizing provider or a provider within the authorizing providers department within the previous 12 mos or has a future within next 30 days. See \"Patient Info\" tab in inbasket, or \"Choose Columns\" in Meds & Orders section of the refill encounter.              Passed - Medication is active on med list        Passed - Patient is age 18 or older           Freda Warner RN on 8/30/2021 at 2:21 PM    "

## 2021-09-15 ENCOUNTER — TELEPHONE (OUTPATIENT)
Dept: AUDIOLOGY | Facility: CLINIC | Age: 66
End: 2021-09-15

## 2021-09-15 NOTE — TELEPHONE ENCOUNTER
[facility] Call [facility]    Phone Message    May a detailed message be left on voicemail: yes     Reason for Call: Other: Pt called back looking to schedule for ordering hearing aids. He has decided he wants the behind the ear hearing aid. Please call Pt to discuss options/timeframe for scheduling once ordered. Thank you.     Action Taken: Message routed to:  [facility] Audiology    Travel Screening: Not Applicable

## 2021-09-16 ENCOUNTER — MYC MEDICAL ADVICE (OUTPATIENT)
Dept: FAMILY MEDICINE | Facility: CLINIC | Age: 66
End: 2021-09-16

## 2021-09-16 ENCOUNTER — OFFICE VISIT (OUTPATIENT)
Dept: FAMILY MEDICINE | Facility: CLINIC | Age: 66
End: 2021-09-16
Payer: COMMERCIAL

## 2021-09-16 VITALS
HEART RATE: 60 BPM | DIASTOLIC BLOOD PRESSURE: 75 MMHG | WEIGHT: 227 LBS | HEIGHT: 70 IN | BODY MASS INDEX: 32.5 KG/M2 | OXYGEN SATURATION: 99 % | SYSTOLIC BLOOD PRESSURE: 133 MMHG

## 2021-09-16 DIAGNOSIS — Z00.00 ROUTINE GENERAL MEDICAL EXAMINATION AT A HEALTH CARE FACILITY: Primary | ICD-10-CM

## 2021-09-16 DIAGNOSIS — I10 HYPERTENSION WITH GOAL BLOOD PRESSURE LESS THAN 140/90: ICD-10-CM

## 2021-09-16 DIAGNOSIS — H90.3 SENSORINEURAL HEARING LOSS, BILATERAL: Primary | ICD-10-CM

## 2021-09-16 DIAGNOSIS — E78.5 HYPERLIPIDEMIA LDL GOAL <130: ICD-10-CM

## 2021-09-16 LAB
ANION GAP SERPL CALCULATED.3IONS-SCNC: 2 MMOL/L (ref 3–14)
BUN SERPL-MCNC: 21 MG/DL (ref 7–30)
CALCIUM SERPL-MCNC: 8.9 MG/DL (ref 8.5–10.1)
CHLORIDE BLD-SCNC: 108 MMOL/L (ref 94–109)
CHOLEST SERPL-MCNC: 171 MG/DL
CO2 SERPL-SCNC: 29 MMOL/L (ref 20–32)
CREAT SERPL-MCNC: 1.01 MG/DL (ref 0.66–1.25)
FASTING STATUS PATIENT QL REPORTED: YES
GFR SERPL CREATININE-BSD FRML MDRD: 78 ML/MIN/1.73M2
GLUCOSE BLD-MCNC: 108 MG/DL (ref 70–99)
HDLC SERPL-MCNC: 46 MG/DL
LDLC SERPL CALC-MCNC: 104 MG/DL
NONHDLC SERPL-MCNC: 125 MG/DL
POTASSIUM BLD-SCNC: 4.4 MMOL/L (ref 3.4–5.3)
SODIUM SERPL-SCNC: 139 MMOL/L (ref 133–144)
TRIGL SERPL-MCNC: 105 MG/DL

## 2021-09-16 PROCEDURE — 99396 PREV VISIT EST AGE 40-64: CPT | Performed by: INTERNAL MEDICINE

## 2021-09-16 PROCEDURE — 80061 LIPID PANEL: CPT | Performed by: INTERNAL MEDICINE

## 2021-09-16 PROCEDURE — 80048 BASIC METABOLIC PNL TOTAL CA: CPT | Performed by: INTERNAL MEDICINE

## 2021-09-16 PROCEDURE — 36415 COLL VENOUS BLD VENIPUNCTURE: CPT | Performed by: INTERNAL MEDICINE

## 2021-09-16 RX ORDER — SIMVASTATIN 40 MG
40 TABLET ORAL AT BEDTIME
Qty: 90 TABLET | Refills: 4 | Status: SHIPPED | OUTPATIENT
Start: 2021-09-16 | End: 2022-09-19

## 2021-09-16 ASSESSMENT — ENCOUNTER SYMPTOMS
HEARTBURN: 0
ABDOMINAL PAIN: 0
CONSTIPATION: 0
PALPITATIONS: 0
NAUSEA: 0
EYE PAIN: 0
HEMATOCHEZIA: 0
WEAKNESS: 0
MYALGIAS: 0
SHORTNESS OF BREATH: 0
JOINT SWELLING: 0
DIARRHEA: 0
COUGH: 0
FREQUENCY: 0
DIZZINESS: 0
FEVER: 0
CHILLS: 0
HEMATURIA: 0
ARTHRALGIAS: 0
NERVOUS/ANXIOUS: 0
SORE THROAT: 0
PARESTHESIAS: 0
DYSURIA: 0
HEADACHES: 0

## 2021-09-16 ASSESSMENT — MIFFLIN-ST. JEOR: SCORE: 1821.95

## 2021-09-16 NOTE — PROGRESS NOTES
SUBJECTIVE:   CC: Mitchell Randolph is an 66 year old male who presents for preventative health visit.   Patient has been advised of split billing requirements and indicates understanding: Yes  Healthy Habits:     Getting at least 3 servings of Calcium per day:  Yes    Bi-annual eye exam:  Yes    Dental care twice a year:  Yes    Sleep apnea or symptoms of sleep apnea:  None and Excessive snoring    Diet:  Low salt    Frequency of exercise:  2-3 days/week    Duration of exercise:  Less than 15 minutes    Taking medications regularly:  No    Medication side effects:  None    PHQ-2 Total Score: 0    Additional concerns today:  No      At home better at work and anxiety  Got a hearing test done last year   Looking at the price .        Today's PHQ-2 Score:   PHQ-2 ( 1999 [employer]) 9/16/2021   Q1: Little interest or pleasure in doing things 0   Q2: Feeling down, depressed or hopeless 0   PHQ-2 Score 0   Q1: Little interest or pleasure in doing things Not at all   Q2: Feeling down, depressed or hopeless Not at all   PHQ-2 Score 0       Abuse: Current or Past(Physical, Sexual or Emotional)- No  Do you feel safe in your environment? Yes        Social History     Tobacco Use     Smoking status: Never Smoker     Smokeless tobacco: Never Used   Substance Use Topics     Alcohol use: Yes     Comment: beer once in a while     If you drink alcohol do you typically have >3 drinks per day or >7 drinks per week? No    Alcohol Use 9/16/2021   Prescreen: >3 drinks/day or >7 drinks/week? No   Prescreen: >3 drinks/day or >7 drinks/week? -   No flowsheet data found.    Last PSA:   PSA   Date Value Ref Range Status   04/04/2019 1.04 0 - 4 ug/L Final     Comment:     Assay Method:  Chemiluminescence using Siemens Vista analyzer       Reviewed orders with patient. Reviewed health maintenance and updated orders accordingly - Yes  Lab work is in process  Labs reviewed in EPIC  BP Readings from Last 3 Encounters:   09/16/21 133/75   02/25/21 125/63    01/22/21 (!) 154/85    Wt Readings from Last 3 Encounters:   09/16/21 103 kg (227 lb)   02/26/21 101.2 kg (223 lb)   02/25/21 101.2 kg (223 lb)                  Patient Active Problem List   Diagnosis     Slow urinary stream     HYPERLIPIDEMIA LDL GOAL <130     Tinea corporis     Advanced directives, counseling/discussion     Hypertension with goal blood pressure less than 140/90     Family history of prostate cancer in father     Past Surgical History:   Procedure Laterality Date     ORTHOPEDIC SURGERY  Mar 2019     TONSILLECTOMY  age 6 or 7       Social History     Tobacco Use     Smoking status: Never Smoker     Smokeless tobacco: Never Used   Substance Use Topics     Alcohol use: Yes     Comment: beer once in a while     Family History   Problem Relation Age of Onset     Cancer Maternal Grandfather         bone         Current Outpatient Medications   Medication Sig Dispense Refill     lisinopril (ZESTRIL) 40 MG tablet Take 1 tablet (40 mg) by mouth daily 90 tablet 4     simvastatin (ZOCOR) 40 MG tablet Take 1 tablet (40 mg) by mouth At Bedtime Due for recheck in March 2017 90 tablet 0     No Known Allergies    Reviewed and updated as needed this visit by clinical staff  Tobacco  Allergies  Meds              Reviewed and updated as needed this visit by Provider                    Review of Systems   Constitutional: Negative for chills and fever.   HENT: Negative for congestion, ear pain, hearing loss and sore throat.    Eyes: Negative for pain and visual disturbance.   Respiratory: Negative for cough and shortness of breath.    Cardiovascular: Negative for chest pain, palpitations and peripheral edema.   Gastrointestinal: Negative for abdominal pain, constipation, diarrhea, heartburn, hematochezia and nausea.   Genitourinary: Negative for discharge, dysuria, frequency, genital sores, hematuria, impotence and urgency.   Musculoskeletal: Negative for arthralgias, joint swelling and myalgias.   Skin: Negative  "for rash.   Neurological: Negative for dizziness, weakness, headaches and paresthesias.   Psychiatric/Behavioral: Negative for mood changes. The patient is not nervous/anxious.      CONSTITUTIONAL: NEGATIVE for fever, chills, change in weight  INTEGUMENTARY/SKIN: NEGATIVE for worrisome rashes, moles or lesions  EYES: NEGATIVE for vision changes or irritation  ENT: NEGATIVE for ear, mouth and throat problems  RESP: NEGATIVE for significant cough or SOB  CV: NEGATIVE for chest pain, palpitations or peripheral edema  GI: NEGATIVE for nausea, abdominal pain, heartburn, or change in bowel habits   male: negative for dysuria, hematuria, decreased urinary stream, erectile dysfunction, urethral discharge  MUSCULOSKELETAL: NEGATIVE for significant arthralgias or myalgia  NEURO: NEGATIVE for weakness, dizziness or paresthesias  PSYCHIATRIC: NEGATIVE for changes in mood or affect    OBJECTIVE:   /75 (BP Location: Right arm, Patient Position: Chair, Cuff Size: Adult Regular)   Pulse 60   Ht 1.772 m (5' 9.75\")   Wt 103 kg (227 lb)   SpO2 99%   BMI 32.81 kg/m      Physical Exam  GENERAL: healthy, alert and no distress  EYES: Eyes grossly normal to inspection, PERRL and conjunctivae and sclerae normal  HENT: ear canals and TM's normal, nose and mouth without ulcers or lesions  NECK: no adenopathy, no asymmetry, masses, or scars and thyroid normal to palpation  RESP: lungs clear to auscultation - no rales, rhonchi or wheezes  CV: regular rate and rhythm, normal S1 S2, no S3 or S4, no murmur, click or rub, no peripheral edema and peripheral pulses strong  ABDOMEN: soft, nontender, no hepatosplenomegaly, no masses and bowel sounds normal  MS: no gross musculoskeletal defects noted, no edema  SKIN: no suspicious lesions or rashes  NEURO: Normal strength and tone, mentation intact and speech normal  BACK: no CVA tenderness, no paralumbar tenderness  PSYCH: mentation appears normal, affect normal/bright  LYMPH: no cervical, " "supraclavicular, axillary, or inguinal adenopathy    Diagnostic Test Results:  Labs reviewed in Epic  No results found for any visits on 09/16/21.    ASSESSMENT/PLAN:   Mitchell was seen today for physical.    Diagnoses and all orders for this visit:    Routine general medical examination at a health care facility    Hyperlipidemia LDL goal <130        Patient has been advised of split billing requirements and indicates understanding: Yes  COUNSELING:   Reviewed preventive health counseling, as reflected in patient instructions       Regular exercise       Healthy diet/nutrition       Vision screening       Hearing screening       Colon cancer screening    Estimated body mass index is 32.81 kg/m  as calculated from the following:    Height as of this encounter: 1.772 m (5' 9.75\").    Weight as of this encounter: 103 kg (227 lb).     Weight management plan: Discussed healthy diet and exercise guidelines    He reports that he has never smoked. He has never used smokeless tobacco.      ICD-10-CM    1. Routine general medical examination at a health care facility  Z00.00    2. Hyperlipidemia LDL goal <130  E78.5 simvastatin (ZOCOR) 40 MG tablet     Lipid panel reflex to direct LDL Fasting     Lipid panel reflex to direct LDL Fasting   3. Hypertension with goal blood pressure less than 140/90  I10 Basic metabolic panel  (Ca, Cl, CO2, Creat, Gluc, K, Na, BUN)     Basic metabolic panel  (Ca, Cl, CO2, Creat, Gluc, K, Na, BUN)       Counseling Resources:  ATP IV Guidelines  Pooled Cohorts Equation Calculator  FRAX Risk Assessment  ICSI Preventive Guidelines  Dietary Guidelines for Americans, 2010  USDA's MyPlate  ASA Prophylaxis  Lung CA Screening    Jey Joya MD  [facility]  "

## 2021-09-16 NOTE — TELEPHONE ENCOUNTER
Left VM for patient regarding the following information.    1. Patient had been quoted for two BTE hearing aids. The first (Phonak) is $3600 for both devices, the other option was Unitron Au2 which was less expensive but that option is no longer available. Wanted to confirm that the Phonak device at that price point is OK. Also offered another hearing aid consultation as there have been new devices on the market.    2. The hearing test is almost a year old and so an updated hearing test would be needed prior to fitting the hearing aids.    3. These appointment types are currently booking into November 2021.      Requested call back asking how he would like to proceed regarding the hearing aids so that the appropriate appointments can be set up      Carla Valdez  Audiologist  [place] License  #[id number]

## 2021-09-26 ENCOUNTER — HEALTH MAINTENANCE LETTER (OUTPATIENT)
Age: 66
End: 2021-09-26

## 2021-11-11 ENCOUNTER — OFFICE VISIT (OUTPATIENT)
Dept: AUDIOLOGY | Facility: CLINIC | Age: 66
End: 2021-11-11
Payer: COMMERCIAL

## 2021-11-11 DIAGNOSIS — H90.3 SENSORINEURAL HEARING LOSS, BILATERAL: ICD-10-CM

## 2021-11-11 PROCEDURE — 92557 COMPREHENSIVE HEARING TEST: CPT | Performed by: AUDIOLOGIST

## 2021-11-11 PROCEDURE — 92591 PR HEARING AID EXAM BINAURAL: CPT | Performed by: AUDIOLOGIST

## 2021-11-11 PROCEDURE — 92567 TYMPANOMETRY: CPT | Performed by: AUDIOLOGIST

## 2021-11-11 NOTE — PROGRESS NOTES
AUDIOLOGY REPORT    SUBJECTIVE:  Mitchell Randolph is a 66 year old male who was seen in the Audiology Clinic at the [facility] for audiologic evaluation and hearing aid consultation. The patient has been seen previously in this clinic on 11/4/2020 for assessment and results indicated a bilateral asymmetric sensorineural hearing loss.  He underwent a hearing aid consultation in early 2021 but did not pursue devices at this time. He reports that he would like to pursue hearing aids now as he has noticed that he needs repetition frequently. The patient reports constant bilateral tinnitus that has been stable. The patient denies  bilateral otalgia, bilateral drainage, bilateral aural fullness and dizziness.  The patient notes difficulty with communication in a variety of listening situations.     OBJECTIVE:    Fall Risk Screen:  1. Have you fallen two or more times in the past year? No  2. Have you fallen and had an injury in the past year? No    Timed Up and Go Score (in seconds): not tested  Is patient a fall risk? No  Referral initiated: No  Fall Risk Assessment Completed by Audiology    Otoscopic exam indicates ears are clear of cerumen bilaterally     Pure Tone Thresholds assessed using conventional audiometry with good  reliability from 250-8000 Hz bilaterally using insert earphones and circumaural headphones     RIGHT:  normal to moderate sensorineural hearing loss    LEFT:    normal to moderate sensorineural hearing loss    Tympanogram:    RIGHT: normal eardrum mobility    LEFT:   normal eardrum mobility    Speech Reception Threshold:    RIGHT: 20 dB HL    LEFT:   15 dB HL    Word Recognition Score:     RIGHT: 88% at 65 dB HL using NU-6 recorded word list.    LEFT:   100% at 60 dB HL using NU-6 recorded word list.    Patient is a hearing aid candidate. Patient would like to move forward with a hearing aid evaluation today. Therefore, the patient was presented with  different options for amplification to help aid in communication. Discussed styles, levels of technology and monaural vs. binaural fitting. 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 He does enjoy spending time outside. He would like to try rechargeable devices. He also would be interested in streaming and a phone lana for his Android phone. He is concerned about losing the hearing aids with mask use but after reviewing the pros and cons about sound quality he would like to start with a RITE style.    The hearing aid(s) mutually chosen were:  Binaural: Phonak Audeo P50R  COLOR: P7  BATTERY SIZE: rechargeable  EARMOLD/TIPS: medium closed  CANAL/ LENGTH: 2    ASSESSMENT:     ICD-10-CM    1. Sensorineural hearing loss, bilateral  H90.3 Adult Audiology Referral       Compared to patient's previous audiogram dated 11/4/20, hearing has remained stable bilaterally. Today s results were discussed with the patient in detail.     Reviewed purchase information and warranty information with patient. The 45 day trial period was explained to patient. The patient was given a copy of the [place] [employer] consumer brochure on purchasing hearing instruments. Patient risk factors have been provided to the patient in writing prior to the sale of the hearing aid per FDA regulation. The risk factors are also available in the User Instructional Booklet to be presented on the day of the hearing aid fitting. Hearing aid(s) ordered. Hearing aid evaluation completed.    PLAN:  Patient was counseled regarding hearing loss and impact on communication.  Patient is a good candidate for amplification at this time.  Handout on good communication strategies, and hearing aid use was given to patient. It is recommended that the patient continue to monitor his hearing status every 2-3 years, sooner if  concerns.  Mitchell is scheduled to return in 2-3 weeks for a hearing aid fitting and programming. Purchase agreement will be completed on that date. Please contact this clinic with any questions or concerns.        Carla Valdez  Audiologist  [place] License  #[id number]

## 2021-12-03 ENCOUNTER — OFFICE VISIT (OUTPATIENT)
Dept: AUDIOLOGY | Facility: CLINIC | Age: 66
End: 2021-12-03
Payer: COMMERCIAL

## 2021-12-03 DIAGNOSIS — H90.3 SENSORINEURAL HEARING LOSS, BILATERAL: Primary | ICD-10-CM

## 2021-12-03 PROCEDURE — V5160 DISPENSING FEE BINAURAL: HCPCS | Performed by: AUDIOLOGIST

## 2021-12-03 PROCEDURE — V5011 HEARING AID FITTING/CHECKING: HCPCS | Performed by: AUDIOLOGIST

## 2021-12-03 PROCEDURE — V5020 CONFORMITY EVALUATION: HCPCS | Performed by: AUDIOLOGIST

## 2021-12-03 PROCEDURE — V5261 HEARING AID, DIGIT, BIN, BTE: HCPCS | Performed by: AUDIOLOGIST

## 2021-12-03 NOTE — PROGRESS NOTES
AUDIOLOGY REPORT    SUBJECTIVE: Mitchell Randolph is a 66 year old male who was seen in the Audiology Clinic at the [facility] for a fitting of binaural Phonak Audeo P50R hearing aids. Previous results have revealed a bilateral asymmetric sensorineural hearing loss. The patient is a new user of hearing aids    OBJECTIVE: The hearing aid conformity evaluation was completed.The hearing aids were placed and they provided a good fit. Real-ear-probe-microphone measurements were completed on the [employer] system and were a good match to NAL-NL1 target with soft sounds audible, moderate sounds comfortable, and loud sounds below discomfort. UCLs are verified through maximum power output measures and demonstrate appropriate limiting of loud inputs. Mitchell was oriented to proper hearing aid use, care, cleaning (no water, dry brush), batteries (size: BATTERY SIZE: rechargeable, insertion/removal, toxicity, low-battery signal), aid insertion/removal, user booklet, warranty information, storage cases, and other hearing aid details. The patient confirmed understanding of hearing aid use and care, and showed proper insertion of hearing aid and batteries while in the office today.  He reported that things were initially a little too loud and so the hearing aids were set to 80% and will auto-acclimatize to 100% over 21 days. Mitchell reported good volume and sound quality today.   He practiced taking his mask on and off as well as using his work safety glasses with the hearing aids today.  Hearing aids were programmed as follows:  Program 1:AutoSense  Toggle is deactivated except for on/off and phone use    EAR(S) FIT: Bilateral  HEARING AID MODEL NAME: Phonak Audo P50R  HEARING AID STYLE: -in-the-ear behind-the-ear  EARMOLDS/TIP: large open  SERIAL NUMBERS: Right: [id number] Left: [id number]  WARRANTY END DATE: 2/12/2025    ASSESSMENT: Bilateral hearing aids were fit today. Verification  measures were performed. Mitchell signed the Hearing Aid Purchase Agreement and was given a copy, as well as details on his hearing aids. Patient was counseled that exact out of pocket amounts cannot be determined for hearing aid claims being sent to insurance. Any insurance coverage information presented to the patient is an estimate only, and is not a guarantee of payment. Patient has been advised to check with their own insurance.    PLAN:Mitchell will return for follow-up in 2-3 weeks for a hearing aid review appointment. Please call this clinic with questions regarding today s appointment.    Carla Valdez  Audiologist  [place] License  #[id number]

## 2021-12-23 ENCOUNTER — OFFICE VISIT (OUTPATIENT)
Dept: AUDIOLOGY | Facility: CLINIC | Age: 66
End: 2021-12-23
Payer: COMMERCIAL

## 2021-12-23 DIAGNOSIS — H90.3 SENSORINEURAL HEARING LOSS, ASYMMETRICAL: Primary | ICD-10-CM

## 2021-12-23 PROCEDURE — 99207 PR ASSESSMENT FOR HEARING AID: CPT | Performed by: AUDIOLOGIST

## 2021-12-23 NOTE — PROGRESS NOTES
"AUDIOLOGY REPORT    SUBJECTIVE:Mitchell Randolph is a 66 year old male who was seen in the Audiology Clinic at the [facility] on 12/23/2021  for a follow-up check regarding the fitting of new hearing aids. Previous results have revealed a bilateral asymmetric sensorineural hearing loss.  The patient has been seen previously in this clinic and was fit with binaural Phonak Audeo P50R hearing aids on 12/3/2021.  Mitchell reports that he is hearing well in quiet but feels that it is still too \"bright\" and loud. 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 He feels that he will likely keep the hearing aids.    OBJECTIVE:   Based on patient report, the following changes were made; high frequency gain was decreased 4-5 dB from 4-8 kHz and he reported better sound quality. A special speech in noise program was added with a fixed forward directional microphone. The volume control is active.    Reviewed 45 day trial period, care, cleaning (no water, dry brush), batteries (rechargeable) insertion/removal, toxicity, low-battery signal), aid insertion/removal, volume adjustment (if applicable), user booklet, warranty information, storage cases, and other hearing aid details.     The hearing aids were paired with his Android phone. A review of phone calls and the phone lana was reviewed and he expressed understanding of how to use it.    No charge visit today (in warranty hearing aid check).     ASSESSMENT: A follow-up appointment for hearing aid fitting was completed today.  Changes to hearing aid was completed as outlined above.     PLAN:Mitchell will return for follow-up as needed, or at least every 6-9 months for cleaning and assessment of hearing aid.  .He declined another appointment within his 45 day trial period. Please call this clinic with any questions regarding today s appointment.    Carla Valdez  Audiologist  [place] License  #[id number]      "

## 2022-03-23 ENCOUNTER — MYC MEDICAL ADVICE (OUTPATIENT)
Dept: FAMILY MEDICINE | Facility: CLINIC | Age: 67
End: 2022-03-23
Payer: COMMERCIAL

## 2022-03-23 DIAGNOSIS — I10 HYPERTENSION WITH GOAL BLOOD PRESSURE LESS THAN 140/90: ICD-10-CM

## 2022-03-24 NOTE — TELEPHONE ENCOUNTER
Pending Prescriptions:                       Disp   Refills    lisinopril (ZESTRIL) 40 MG tablet         90 tab*4            Sig: Take 1 tablet (40 mg) by mouth daily

## 2022-03-25 RX ORDER — LISINOPRIL 40 MG/1
40 TABLET ORAL DAILY
Qty: 90 TABLET | Refills: 1 | Status: SHIPPED | OUTPATIENT
Start: 2022-03-25 | End: 2022-09-19

## 2022-04-01 ENCOUNTER — ANCILLARY PROCEDURE (OUTPATIENT)
Dept: GENERAL RADIOLOGY | Facility: CLINIC | Age: 67
End: 2022-04-01
Attending: INTERNAL MEDICINE
Payer: COMMERCIAL

## 2022-04-01 ENCOUNTER — OFFICE VISIT (OUTPATIENT)
Dept: INTERNAL MEDICINE | Facility: CLINIC | Age: 67
End: 2022-04-01
Payer: COMMERCIAL

## 2022-04-01 VITALS
HEART RATE: 64 BPM | BODY MASS INDEX: 33.24 KG/M2 | SYSTOLIC BLOOD PRESSURE: 177 MMHG | RESPIRATION RATE: 14 BRPM | TEMPERATURE: 98.1 F | WEIGHT: 230 LBS | DIASTOLIC BLOOD PRESSURE: 91 MMHG | OXYGEN SATURATION: 99 %

## 2022-04-01 DIAGNOSIS — Z87.81 HISTORY OF FRACTURE OF RIGHT ANKLE: ICD-10-CM

## 2022-04-01 DIAGNOSIS — M25.572 PAIN IN JOINT INVOLVING ANKLE AND FOOT, LEFT: Primary | ICD-10-CM

## 2022-04-01 DIAGNOSIS — I10 HYPERTENSION WITH GOAL BLOOD PRESSURE LESS THAN 140/90: ICD-10-CM

## 2022-04-01 DIAGNOSIS — G57.61 MORTON NEUROMA, RIGHT: ICD-10-CM

## 2022-04-01 DIAGNOSIS — M25.572 PAIN IN JOINT INVOLVING ANKLE AND FOOT, LEFT: ICD-10-CM

## 2022-04-01 PROCEDURE — 99213 OFFICE O/P EST LOW 20 MIN: CPT | Performed by: INTERNAL MEDICINE

## 2022-04-01 PROCEDURE — 73630 X-RAY EXAM OF FOOT: CPT | Mod: LT | Performed by: RADIOLOGY

## 2022-04-01 NOTE — PROGRESS NOTES
"  Assessment & Plan     Pain in joint involving ankle and foot, left  - XR Foot Left G/E 3 Views; Future  - Orthopedic  Referral; Future  Tsai neuroma, right  ------------  RE: above -- I wonder if his new foot wear (metal toe shoes for work) could have been offensive  He did not have a clear cut injury, and xray is wnl.   He may have Tsai's Neuroma  --he will try metatarsal head  -- use better footwear, pay attention to pain // shoes  -- put podiatry on calendar, bring foot wear.      Hypertension with goal blood pressure less than 140/90  High, I did not notice until after he left .  Will ask triage to call him and have him report home BP     History of fracture of right ankle 2018   likely not a factor   H/o          I spent a total of 20 minutes on the day of the visit.   Time spent doing chart review, history and exam, documentation and further activities per the note        BMI:   Estimated body mass index is 33.24 kg/m  as calculated from the following:    Height as of 9/16/21: 1.772 m (5' 9.75\").    Weight as of this encounter: 104.3 kg (230 lb).            Return in about 6 months (around 10/1/2022) for Physical Exam.    Montse Lopez MD  [facility]    =====================================================  =======================================================    Subjective   Mitchell is a 66 year old who presents for the following health issues     Musculoskeletal Problem    History of Present Illness       Reason for visit:  Foot injury  Symptom onset:  3-4 weeks ago  Symptoms include:  Pain under foot and on top of toe  Symptom intensity:  Moderate  Symptom progression:  Staying the same  Had these symptoms before:  No    He eats 2-3 servings of fruits and vegetables daily.He consumes 1 sweetened beverage(s) daily.He exercises with enough effort to increase his heart rate 10 to 19 minutes per day.  He exercises with enough effort to increase his heart rate 3 or less " days per week.   He is taking medications regularly.             Review of Systems   Constitutional, HEENT, cardiovascular, pulmonary, gi and gu systems are negative, except as otherwise noted.      Objective    BP (!) 177/91   Pulse 64   Temp 98.1  F (36.7  C) (Oral)   Resp 14   Wt 104.3 kg (230 lb)   SpO2 99%   BMI 33.24 kg/m    There is no height or weight on file to calculate BMI.     Physical Exam   GENERAL: healthy, alert and no distress  EYES: Eyes grossly normal to inspection, PERRL and conjunctivae and sclerae normal  MS: no gross musculoskeletal defects noted, no edema  MS: foot with normal pulses, color.   Pain to papation at metatarsal head as well as distal joint of his L second toe, dorsal aspect.. a very specific spot      SKIN: no suspicious lesions or rashes  NEURO: Normal strength and tone, mentation intact and speech normal  PSYCH: mentation appears normal, affect normal/bright    No results found for this or any previous visit (from the past 24 hour(s)).    Xray wnl

## 2022-04-01 NOTE — Clinical Note
I noted his elevated BP after he left.  Would you call him and let him know this?  Could he check BP at home and report to us?  Thanks.

## 2022-04-01 NOTE — PATIENT INSTRUCTIONS
Metatarsal pads    Rest/change that foot wear    Ice/heat.     Podiatry if no improvement:   Bring usual footwear.

## 2022-04-04 ENCOUNTER — TELEPHONE (OUTPATIENT)
Dept: INTERNAL MEDICINE | Facility: CLINIC | Age: 67
End: 2022-04-04
Payer: COMMERCIAL

## 2022-04-04 ENCOUNTER — MYC MEDICAL ADVICE (OUTPATIENT)
Dept: INTERNAL MEDICINE | Facility: CLINIC | Age: 67
End: 2022-04-04
Payer: COMMERCIAL

## 2022-04-04 NOTE — TELEPHONE ENCOUNTER
Reason for call:  Other   Patient called regarding (reason for call): returning call  Additional comments: Patient called back. Please return his call.     His is in meetings 12:30 -1:30 today April 4, 2022  Phone number to reach patient:  Cell number on file:    Telephone Information:   Mobile [phone]     Best Time:      Can we leave a detailed message on this number?  YES    Travel screening: Not Applicable

## 2022-04-04 NOTE — TELEPHONE ENCOUNTER
Per Montse Mcgraw-    I noted his elevated BP after he left.  Would you call him and let him know this?  Could he check BP at home and report to us?   Thanks      BP Readings from Last 3 Encounters:   04/01/22 (!) 177/91   09/16/21 133/75   02/25/21 125/63     Vidya Garza RN  [facility]

## 2022-05-05 ENCOUNTER — OFFICE VISIT (OUTPATIENT)
Dept: PODIATRY | Facility: CLINIC | Age: 67
End: 2022-05-05
Attending: INTERNAL MEDICINE
Payer: COMMERCIAL

## 2022-05-05 VITALS
OXYGEN SATURATION: 98 % | DIASTOLIC BLOOD PRESSURE: 90 MMHG | WEIGHT: 228 LBS | SYSTOLIC BLOOD PRESSURE: 163 MMHG | BODY MASS INDEX: 32.95 KG/M2 | HEART RATE: 84 BPM

## 2022-05-05 DIAGNOSIS — M77.8 CAPSULITIS OF FOOT, LEFT: Primary | ICD-10-CM

## 2022-05-05 PROCEDURE — 99203 OFFICE O/P NEW LOW 30 MIN: CPT | Performed by: PODIATRIST

## 2022-05-05 NOTE — PATIENT INSTRUCTIONS
I have reviewed discharge instructions with the patient. The patient verbalized understanding and signed. We wish you continued good healing. If you have any questions or concerns, please do not hesitate to contact us at  [phone]    [employer] (secure e-mail communication and access to your chart) to send a message or to make an appointment.    Please remember to call and schedule a follow up appointment if one was recommended at your earliest convenience.     PODIATRY CLINIC HOURS  TELEPHONE NUMBER    Dr. Jey WOOTENPLISA [facility]        Clinics:  Jadiel Romero CMA   Tuesday 1PM-6PM  [place]  Wednesday 745AM-330PM  [place]  Thursday/Friday 745AM-230PM  [place] APPOINTMENTS  [phone]    [place] APPOINTMENTS  [phone]        If you need a medication refill, please contact us you may need lab work and/or a follow up visit prior to your refill (i.e. Antifungal medications).  If MRI needed please call Imaging at [phone] or [phone]  HOW DO I GET MY KNEE SCOOTER? Knee scooters can be picked up at ANY Medical Supply stores with your knee scooter Prescription.  OR  Bring your signed prescription to an [facility] showroom.          Mitchell to follow up with Primary Care provider regarding elevated blood pressure.

## 2022-05-05 NOTE — LETTER
5/5/2022         RE: Mitchell Randolph  [address] [address]        Dear Colleague,    Thank you for referring your patient, Mitchell Randolph, to the [facility]. Please see a copy of my visit note below.    S:  Patient seen today in consult from Dr. Lopez and complains of left foot pain.  Points to plantar second metatarsal head.  Describes as a burning pain.  aggravated by activity and relieved by rest.  Has had this for 2 months.  No pain anywhere else on feet.  And started after pickleball.  Also got new shoes for work.   and on his feet at times.  Did get new pair of shoes.  Denies edema ecchymosis numbness or weakness.    ROS: See above     No Known Allergies    Current Outpatient Medications   Medication Sig Dispense Refill     lisinopril (ZESTRIL) 40 MG tablet Take 1 tablet (40 mg) by mouth daily 90 tablet 1     simvastatin (ZOCOR) 40 MG tablet Take 1 tablet (40 mg) by mouth At Bedtime 90 tablet 4       Patient Active Problem List   Diagnosis     Slow urinary stream     HYPERLIPIDEMIA LDL GOAL <130     Tinea corporis     Advanced directives, counseling/discussion     Hypertension with goal blood pressure less than 140/90     Family history of prostate cancer in father       Past Medical History:   Diagnosis Date     High cholesterol      Hypertension        Past Surgical History:   Procedure Laterality Date     ORTHOPEDIC SURGERY  Mar 2019     TONSILLECTOMY  age 6 or 7       Family History   Problem Relation Age of Onset     Cancer Maternal Grandfather         bone       Social History     Tobacco Use     Smoking status: Never Smoker     Smokeless tobacco: Never Used   Substance Use Topics     Alcohol use: Yes     Comment: beer once in a while         O:   BP (!) 163/90   Pulse 84   Wt 103.4 kg (228 lb)   SpO2 98%   BMI 32.95 kg/m  .      Constitutional/ general:  Pt is in no apparent distress, appears well-nourished.  Cooperative with history  and physical exam.     Psych:  The patient answered questions appropriately.  Normal affect.  Seems to have reasonable expectations, in terms of treatment.     Lungs:  Non labored breathing, non labored speech. No cough.  No audible wheezing. Even, quiet breathing.       Vascular:  Pedal pulses are palpable bilaterally for both the DP and PT arteries.  CFT < 3 sec.  No edema.  Pedal hair growth noted.     Neuro:  Alert and oriented x 3. Coordinated gait.  Light touch sensation is intact   Derm: Normal texture and turgor.  No erythema, ecchymosis, or cyanosis.  No open lesions.     Musculoskeletal:    Lower extremity muscle strength is normal.  Patient is ambulatory without an assistive device or brace.  Normal arch with weightbearing.  No forefoot or rear foot deformities noted.  MS 5/5 all compartments.  Normal ROM all fore foot and rearfoot joints.  No equinus.  Pain under left second metatarsal head plantar.  Negative Lachmans test.  Negative Mulders click.  No pain anywhere else.  No erythema, edema, ecchymosis, or subcutaneous masses noted.      Radiographic Exam:   Long second metatarsal, but no other bone abnormalities.     A:  Subsecond capsulitis left foot     P:  X rays personally reviewed.  Discussed cause with patient.  Ice bid.  Instructed to wear stiff soles shoes at all times and I made suggestions for both inside and outside.  Has his new work shoes here today and quite malleable.  He will no longer wear these..  Dispensed budin splint.  Dispensed metatarsal pad.  Avoid activities that bother this and discussed which activities will aggravate.  RETURN TO CLINIC PRN.  Thank you for allowing me participate in the care of this patient.        Jey Gillette DPM, FACFAS         Again, thank you for allowing me to participate in the care of your patient.        Sincerely,        Jey Gillette DPM

## 2022-05-05 NOTE — PROGRESS NOTES
S:  Patient seen today in consult from Dr. Lopez and complains of left foot pain.  Points to plantar second metatarsal head.  Describes as a burning pain.  aggravated by activity and relieved by rest.  Has had this for 2 months.  No pain anywhere else on feet.  And started after pickleball.  Also got new shoes for work.   and on his feet at times.  Did get new pair of shoes.  Denies edema ecchymosis numbness or weakness.    ROS: See above     No Known Allergies    Current Outpatient Medications   Medication Sig Dispense Refill     lisinopril (ZESTRIL) 40 MG tablet Take 1 tablet (40 mg) by mouth daily 90 tablet 1     simvastatin (ZOCOR) 40 MG tablet Take 1 tablet (40 mg) by mouth At Bedtime 90 tablet 4       Patient Active Problem List   Diagnosis     Slow urinary stream     HYPERLIPIDEMIA LDL GOAL <130     Tinea corporis     Advanced directives, counseling/discussion     Hypertension with goal blood pressure less than 140/90     Family history of prostate cancer in father       Past Medical History:   Diagnosis Date     High cholesterol      Hypertension        Past Surgical History:   Procedure Laterality Date     ORTHOPEDIC SURGERY  Mar 2019     TONSILLECTOMY  age 6 or 7       Family History   Problem Relation Age of Onset     Cancer Maternal Grandfather         bone       Social History     Tobacco Use     Smoking status: Never Smoker     Smokeless tobacco: Never Used   Substance Use Topics     Alcohol use: Yes     Comment: beer once in a while         O:   BP (!) 163/90   Pulse 84   Wt 103.4 kg (228 lb)   SpO2 98%   BMI 32.95 kg/m  .      Constitutional/ general:  Pt is in no apparent distress, appears well-nourished.  Cooperative with history and physical exam.     Psych:  The patient answered questions appropriately.  Normal affect.  Seems to have reasonable expectations, in terms of treatment.     Lungs:  Non labored breathing, non labored speech. No cough.  No audible wheezing. Even,  quiet breathing.       Vascular:  Pedal pulses are palpable bilaterally for both the DP and PT arteries.  CFT < 3 sec.  No edema.  Pedal hair growth noted.     Neuro:  Alert and oriented x 3. Coordinated gait.  Light touch sensation is intact   Derm: Normal texture and turgor.  No erythema, ecchymosis, or cyanosis.  No open lesions.     Musculoskeletal:    Lower extremity muscle strength is normal.  Patient is ambulatory without an assistive device or brace.  Normal arch with weightbearing.  No forefoot or rear foot deformities noted.  MS 5/5 all compartments.  Normal ROM all fore foot and rearfoot joints.  No equinus.  Pain under left second metatarsal head plantar.  Negative Lachmans test.  Negative Mulders click.  No pain anywhere else.  No erythema, edema, ecchymosis, or subcutaneous masses noted.      Radiographic Exam:   Long second metatarsal, but no other bone abnormalities.     A:  Subsecond capsulitis left foot     P:  X rays personally reviewed.  Discussed cause with patient.  Ice bid.  Instructed to wear stiff soles shoes at all times and I made suggestions for both inside and outside.  Has his new work shoes here today and quite malleable.  He will no longer wear these..  Dispensed budin splint.  Dispensed metatarsal pad.  Avoid activities that bother this and discussed which activities will aggravate.  RETURN TO CLINIC PRN.  Thank you for allowing me participate in the care of this patient.        Jey Gillette DPM, FACFAS

## 2022-09-15 ASSESSMENT — ACTIVITIES OF DAILY LIVING (ADL): CURRENT_FUNCTION: NO ASSISTANCE NEEDED

## 2022-09-15 ASSESSMENT — ENCOUNTER SYMPTOMS
FREQUENCY: 0
NERVOUS/ANXIOUS: 0
ARTHRALGIAS: 0
SHORTNESS OF BREATH: 0
ABDOMINAL PAIN: 0
SORE THROAT: 0
HEARTBURN: 0
FEVER: 0
JOINT SWELLING: 0
CHILLS: 0
PARESTHESIAS: 0
CONSTIPATION: 0
EYE PAIN: 0
MYALGIAS: 0
COUGH: 0
DYSURIA: 0
DIZZINESS: 0
NAUSEA: 0
HEMATURIA: 0
HEADACHES: 0
DIARRHEA: 0
PALPITATIONS: 0
HEMATOCHEZIA: 0
WEAKNESS: 0

## 2022-09-19 ENCOUNTER — OFFICE VISIT (OUTPATIENT)
Dept: FAMILY MEDICINE | Facility: CLINIC | Age: 67
End: 2022-09-19
Payer: COMMERCIAL

## 2022-09-19 VITALS
BODY MASS INDEX: 32.35 KG/M2 | SYSTOLIC BLOOD PRESSURE: 142 MMHG | HEART RATE: 67 BPM | OXYGEN SATURATION: 96 % | DIASTOLIC BLOOD PRESSURE: 82 MMHG | WEIGHT: 226 LBS | TEMPERATURE: 97.8 F | HEIGHT: 70 IN

## 2022-09-19 DIAGNOSIS — I10 HYPERTENSION WITH GOAL BLOOD PRESSURE LESS THAN 140/90: ICD-10-CM

## 2022-09-19 DIAGNOSIS — B35.4 TINEA CORPORIS: ICD-10-CM

## 2022-09-19 DIAGNOSIS — Z12.5 SPECIAL SCREENING FOR MALIGNANT NEOPLASM OF PROSTATE: ICD-10-CM

## 2022-09-19 DIAGNOSIS — E78.5 HYPERLIPIDEMIA LDL GOAL <130: Primary | ICD-10-CM

## 2022-09-19 PROCEDURE — 36415 COLL VENOUS BLD VENIPUNCTURE: CPT | Performed by: INTERNAL MEDICINE

## 2022-09-19 PROCEDURE — 99397 PER PM REEVAL EST PAT 65+ YR: CPT | Mod: 25 | Performed by: INTERNAL MEDICINE

## 2022-09-19 PROCEDURE — 91312 COVID-19,PF,PFIZER BOOSTER BIVALENT: CPT | Performed by: INTERNAL MEDICINE

## 2022-09-19 PROCEDURE — 0124A COVID-19,PF,PFIZER BOOSTER BIVALENT: CPT | Performed by: INTERNAL MEDICINE

## 2022-09-19 PROCEDURE — 80053 COMPREHEN METABOLIC PANEL: CPT | Performed by: INTERNAL MEDICINE

## 2022-09-19 PROCEDURE — G0103 PSA SCREENING: HCPCS | Performed by: INTERNAL MEDICINE

## 2022-09-19 PROCEDURE — 80061 LIPID PANEL: CPT | Performed by: INTERNAL MEDICINE

## 2022-09-19 RX ORDER — LISINOPRIL 40 MG/1
40 TABLET ORAL DAILY
Qty: 90 TABLET | Refills: 4 | Status: SHIPPED | OUTPATIENT
Start: 2022-09-19 | End: 2023-10-27

## 2022-09-19 RX ORDER — FLUCONAZOLE 200 MG/1
200 TABLET ORAL DAILY
Qty: 4 TABLET | Refills: 6 | Status: SHIPPED | OUTPATIENT
Start: 2022-09-19 | End: 2024-07-09

## 2022-09-19 RX ORDER — SIMVASTATIN 40 MG
40 TABLET ORAL AT BEDTIME
Qty: 90 TABLET | Refills: 4 | Status: SHIPPED | OUTPATIENT
Start: 2022-09-19 | End: 2023-10-27

## 2022-09-19 ASSESSMENT — ENCOUNTER SYMPTOMS
SORE THROAT: 0
JOINT SWELLING: 0
HEARTBURN: 0
PALPITATIONS: 0
CONSTIPATION: 0
DYSURIA: 0
PARESTHESIAS: 0
MYALGIAS: 0
DIARRHEA: 0
HEMATURIA: 0
DIZZINESS: 0
COUGH: 0
NAUSEA: 0
CHILLS: 0
HEADACHES: 0
ABDOMINAL PAIN: 0
SHORTNESS OF BREATH: 0
ARTHRALGIAS: 0
EYE PAIN: 0
WEAKNESS: 0
FREQUENCY: 0
HEMATOCHEZIA: 0
NERVOUS/ANXIOUS: 0
FEVER: 0

## 2022-09-19 ASSESSMENT — ACTIVITIES OF DAILY LIVING (ADL): CURRENT_FUNCTION: NO ASSISTANCE NEEDED

## 2022-09-19 NOTE — PROGRESS NOTES
"SUBJECTIVE:   Mitchell is a 67 year old who presents for Preventive Visit.      Patient has been advised of split billing requirements and indicates understanding: Yes  Are you in the first 12 months of your Medicare coverage?  NOT ON MEDICARE YET    Healthy Habits:     In general, how would you rate your overall health?  Good    Frequency of exercise:  1 day/week    Duration of exercise:  15-30 minutes    Do you usually eat at least 4 servings of fruit and vegetables a day, include whole grains    & fiber and avoid regularly eating high fat or \"junk\" foods?  Yes    Taking medications regularly:  Yes    Medication side effects:  None    Ability to successfully perform activities of daily living:  No assistance needed    Home Safety:  No safety concerns identified    Hearing Impairment:  No hearing concerns    In the past 6 months, have you been bothered by leaking of urine?  No    In general, how would you rate your overall mental or emotional health?  Good      PHQ-2 Total Score: 0    Additional concerns today:  No    Do you feel safe in your environment? Yes    Have you ever done Advance Care Planning? (For example, a Health Directive, POLST, or a discussion with a medical provider or your loved ones about your wishes): No, advance care planning information given to patient to review.  Patient plans to discuss their wishes with loved ones or provider.       ligament strain in the foot from pickleball   Has the cochlear implant     Fall risk  Fallen 2 or more times in the past year?: No  Any fall with injury in the past year?: No    Cognitive Screening   1) Repeat 3 items (Leader, Season, Table)    2) Clock draw: NORMAL  3) 3 item recall: Recalls 2 objects   Results: NORMAL clock, 1-2 items recalled: COGNITIVE IMPAIRMENT LESS LIKELY    Mini-CogTM Copyright MANI Castellanos. Licensed by the author for use in [facility]; reprinted with permission (marly@.[facility]). All rights reserved.      Do you have sleep apnea, " excessive snoring or daytime drowsiness?: yes    Reviewed and updated as needed this visit by clinical staff    Allergies  Meds                Reviewed and updated as needed this visit by Provider                   Social History     Tobacco Use     Smoking status: Never Smoker     Smokeless tobacco: Never Used   Substance Use Topics     Alcohol use: Yes     Comment: beer once in a while     If you drink alcohol do you typically have >3 drinks per day or >7 drinks per week? No    Alcohol Use 9/19/2022   Prescreen: >3 drinks/day or >7 drinks/week? -   Prescreen: >3 drinks/day or >7 drinks/week? No     142/81   -    164/87  115 /74  Takes after work -   Changes in exercise, can feel when thinking about things  Eating fruits, vegetables            Current providers sharing in care for this patient include:   Patient Care Team:  No Ref-Primary, Physician as PCP - General  Radha Branch AuD as Audiologist (Audiology)  Susana Cueva AuD as Audiologist (Audiology)  Jey Joya MD as Referring Physician (Internal Medicine)  Jey Joya MD as Assigned PCP  Susana Castelan AuD as Audiologist (Audiology)  Jey Gillette DPM as Assigned Musculoskeletal Provider    The following health maintenance items are reviewed in Epic and correct as of today:  Health Maintenance   Topic Date Due     ZOSTER IMMUNIZATION (1 of 2) Never done     COVID-19 Vaccine (4 - Booster for Pfizer series) 04/06/2022     INFLUENZA VACCINE (1) 09/01/2022     AORTIC ANEURYSM SCREENING (SYSTEM ASSIGNED)  Never done     Pneumococcal Vaccine: 65+ Years (1 - PCV) 09/12/2022     MEDICARE ANNUAL WELLNESS VISIT  09/19/2023     ANNUAL REVIEW OF HM ORDERS  09/19/2023     FALL RISK ASSESSMENT  09/19/2023     LIPID  09/16/2026     ADVANCE CARE PLANNING  09/19/2027     COLORECTAL CANCER SCREENING  05/04/2028     DTAP/TDAP/TD IMMUNIZATION (3 - Td or Tdap) 09/04/2030     HEPATITIS C SCREENING  Completed     PHQ-2 (once per calendar  year)  Completed     HIV SCREENING  Addressed     IPV IMMUNIZATION  Aged Out     MENINGITIS IMMUNIZATION  Aged Out     HEPATITIS B IMMUNIZATION  Aged Out     Lab work is in process  Labs reviewed in EPIC  BP Readings from Last 3 Encounters:   09/19/22 (!) 142/82   05/05/22 (!) 163/90   04/01/22 (!) 177/91    Wt Readings from Last 3 Encounters:   09/19/22 102.5 kg (226 lb)   05/05/22 103.4 kg (228 lb)   04/01/22 104.3 kg (230 lb)                  Patient Active Problem List   Diagnosis     Slow urinary stream     HYPERLIPIDEMIA LDL GOAL <130     Tinea corporis     Advanced directives, counseling/discussion     Hypertension with goal blood pressure less than 140/90     Family history of prostate cancer in father     Past Surgical History:   Procedure Laterality Date     ORTHOPEDIC SURGERY  Mar 2019     TONSILLECTOMY  age 6 or 7       Social History     Tobacco Use     Smoking status: Never Smoker     Smokeless tobacco: Never Used   Substance Use Topics     Alcohol use: Yes     Comment: beer once in a while     Family History   Problem Relation Age of Onset     Cancer Maternal Grandfather         bone         Current Outpatient Medications   Medication Sig Dispense Refill     fluconazole (DIFLUCAN) 200 MG tablet Take 1 tablet (200 mg) by mouth daily 2 tablets as needed, repeat 2 tablets in 2 weeks 4 tablet 6     lisinopril (ZESTRIL) 40 MG tablet Take 1 tablet (40 mg) by mouth daily 90 tablet 4     simvastatin (ZOCOR) 40 MG tablet Take 1 tablet (40 mg) by mouth At Bedtime 90 tablet 4     No Known Allergies          Review of Systems   Constitutional: Negative for chills and fever.   HENT: Negative for congestion, ear pain, hearing loss and sore throat.    Eyes: Negative for pain and visual disturbance.   Respiratory: Negative for cough and shortness of breath.    Cardiovascular: Negative for chest pain, palpitations and peripheral edema.   Gastrointestinal: Negative for abdominal pain, constipation, diarrhea, heartburn,  "hematochezia and nausea.   Genitourinary: Negative for dysuria, frequency, genital sores, hematuria, impotence, penile discharge and urgency.   Musculoskeletal: Negative for arthralgias, joint swelling and myalgias.   Skin: Negative for rash.   Neurological: Negative for dizziness, weakness, headaches and paresthesias.   Psychiatric/Behavioral: Negative for mood changes. The patient is not nervous/anxious.      Constitutional, HEENT, cardiovascular, pulmonary, gi and gu systems are negative, except as otherwise noted.    OBJECTIVE:   BP (!) 142/82   Pulse 67   Temp 97.8  F (36.6  C)   Ht 1.765 m (5' 9.5\")   Wt 102.5 kg (226 lb)   SpO2 96%   BMI 32.90 kg/m   Estimated body mass index is 32.9 kg/m  as calculated from the following:    Height as of this encounter: 1.765 m (5' 9.5\").    Weight as of this encounter: 102.5 kg (226 lb).  Physical Exam  GENERAL: healthy, alert and no distress  EYES: Eyes grossly normal to inspection, PERRL and conjunctivae and sclerae normal  HENT: ear canals and TM's normal, nose and mouth without ulcers or lesions  NECK: no adenopathy, no asymmetry, masses, or scars and thyroid normal to palpation  RESP: lungs clear to auscultation - no rales, rhonchi or wheezes  CV: regular rate and rhythm, normal S1 S2, no S3 or S4, no murmur, click or rub, no peripheral edema and peripheral pulses strong  ABDOMEN: soft, nontender, no hepatosplenomegaly, no masses and bowel sounds normal  MS: no gross musculoskeletal defects noted, no edema  SKIN: no suspicious lesions or rashes  NEURO: Normal strength and tone, mentation intact and speech normal  BACK: no CVA tenderness, no paralumbar tenderness  PSYCH: mentation appears normal, affect normal/bright  LYMPH: no cervical, supraclavicular, axillary, or inguinal adenopathy    Diagnostic Test Results:  Labs reviewed in Epic  No results found for any visits on 09/19/22.    ASSESSMENT / PLAN:   Mitchell was seen today for physical.    Diagnoses and all " "orders for this visit:    Hyperlipidemia LDL goal <130  -     Lipid panel reflex to direct LDL Fasting; Future  -     simvastatin (ZOCOR) 40 MG tablet; Take 1 tablet (40 mg) by mouth At Bedtime  -     Lipid panel reflex to direct LDL Fasting    Hypertension with goal blood pressure less than 140/90  -     Comprehensive metabolic panel (BMP + Alb, Alk Phos, ALT, AST, Total. Bili, TP); Future  -     lisinopril (ZESTRIL) 40 MG tablet; Take 1 tablet (40 mg) by mouth daily  -     Comprehensive metabolic panel (BMP + Alb, Alk Phos, ALT, AST, Total. Bili, TP)    Special screening for malignant neoplasm of prostate  -     PSA, screen; Future  -     PSA, screen    Tinea corporis  -     fluconazole (DIFLUCAN) 200 MG tablet; Take 1 tablet (200 mg) by mouth daily 2 tablets as needed, repeat 2 tablets in 2 weeks    Other orders  -     REVIEW OF HEALTH MAINTENANCE PROTOCOL ORDERS  -     COVID-19,PF,PFIZER BOOSTER BIVALENT            COUNSELING:  Reviewed preventive health counseling, as reflected in patient instructions       Regular exercise       Healthy diet/nutrition       Vision screening       Hearing screening       Dental care       Bladder control       Colon cancer screening    Estimated body mass index is 32.9 kg/m  as calculated from the following:    Height as of this encounter: 1.765 m (5' 9.5\").    Weight as of this encounter: 102.5 kg (226 lb).    Weight management plan: Discussed healthy diet and exercise guidelines    He reports that he has never smoked. He has never used smokeless tobacco.      Appropriate preventive services were discussed with this patient, including applicable screening as appropriate for cardiovascular disease, diabetes, osteopenia/osteoporosis, and glaucoma.  As appropriate for age/gender, discussed screening for colorectal cancer, prostate cancer, breast cancer, and cervical cancer. Checklist reviewing preventive services available has been given to the patient.    Reviewed patients plan " of care and provided an AVS. The Basic Care Plan (routine screening as documented in Health Maintenance) for Mitchell meets the Care Plan requirement. This Care Plan has been established and reviewed with the Patient.    ICD-10-CM    1. Hyperlipidemia LDL goal <130  E78.5 Lipid panel reflex to direct LDL Fasting     simvastatin (ZOCOR) 40 MG tablet     Lipid panel reflex to direct LDL Fasting   2. Hypertension with goal blood pressure less than 140/90  I10 Comprehensive metabolic panel (BMP + Alb, Alk Phos, ALT, AST, Total. Bili, TP)     lisinopril (ZESTRIL) 40 MG tablet     Comprehensive metabolic panel (BMP + Alb, Alk Phos, ALT, AST, Total. Bili, TP)   3. Special screening for malignant neoplasm of prostate  Z12.5 PSA, screen     PSA, screen   4. Tinea corporis  B35.4 fluconazole (DIFLUCAN) 200 MG tablet       Counseling Resources:  ATP IV Guidelines  Pooled Cohorts Equation Calculator  Breast Cancer Risk Calculator  Breast Cancer: Medication to Reduce Risk  FRAX Risk Assessment  ICSI Preventive Guidelines  Dietary Guidelines for Americans, 2010  I.Predictus's MyPlate  ASA Prophylaxis  Lung CA Screening    Jey Joya MD  [facility]    Identified Health Risks:

## 2022-09-20 LAB
ALBUMIN SERPL-MCNC: 3.9 G/DL (ref 3.4–5)
ALP SERPL-CCNC: 82 U/L (ref 40–150)
ALT SERPL W P-5'-P-CCNC: 31 U/L (ref 0–70)
ANION GAP SERPL CALCULATED.3IONS-SCNC: 3 MMOL/L (ref 3–14)
AST SERPL W P-5'-P-CCNC: 13 U/L (ref 0–45)
BILIRUB SERPL-MCNC: 0.6 MG/DL (ref 0.2–1.3)
BUN SERPL-MCNC: 22 MG/DL (ref 7–30)
CALCIUM SERPL-MCNC: 8.6 MG/DL (ref 8.5–10.1)
CHLORIDE BLD-SCNC: 112 MMOL/L (ref 94–109)
CHOLEST SERPL-MCNC: 187 MG/DL
CO2 SERPL-SCNC: 27 MMOL/L (ref 20–32)
CREAT SERPL-MCNC: 0.95 MG/DL (ref 0.66–1.25)
FASTING STATUS PATIENT QL REPORTED: YES
GFR SERPL CREATININE-BSD FRML MDRD: 89 ML/MIN/1.73M2
GLUCOSE BLD-MCNC: 116 MG/DL (ref 70–99)
HDLC SERPL-MCNC: 48 MG/DL
LDLC SERPL CALC-MCNC: 110 MG/DL
NONHDLC SERPL-MCNC: 139 MG/DL
POTASSIUM BLD-SCNC: 4.7 MMOL/L (ref 3.4–5.3)
PROT SERPL-MCNC: 6.5 G/DL (ref 6.8–8.8)
PSA SERPL-MCNC: 1.48 UG/L (ref 0–4)
SODIUM SERPL-SCNC: 142 MMOL/L (ref 133–144)
TRIGL SERPL-MCNC: 144 MG/DL

## 2022-12-26 ENCOUNTER — TELEPHONE (OUTPATIENT)
Dept: FAMILY MEDICINE | Facility: CLINIC | Age: 67
End: 2022-12-26

## 2022-12-26 ENCOUNTER — MYC MEDICAL ADVICE (OUTPATIENT)
Dept: FAMILY MEDICINE | Facility: CLINIC | Age: 67
End: 2022-12-26

## 2022-12-26 DIAGNOSIS — F41.9 ANXIETY DUE TO INVASIVE PROCEDURE: Primary | ICD-10-CM

## 2022-12-26 DIAGNOSIS — M89.9 LESION OF VERTEBRA: ICD-10-CM

## 2022-12-26 DIAGNOSIS — N88.9 CERVICAL LESION: Primary | ICD-10-CM

## 2022-12-26 NOTE — TELEPHONE ENCOUNTER
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 I found this info below:    CERVICAL SPINE CT:  1. No CT evidence for acute fracture or post traumatic subluxation.  2. Nonspecific sclerotic focus at the C5 vertebral body. Differential considerations include both benign and malignant processes. Consider further evaluation with contrast-enhanced MRI of the cervical spine.  3. Multilevel disc degeneration and facet hypertrophy including scattered mild spinal canal and mild-to-moderate neural foraminal stenoses      Ok for MRI as suggested or should he be added to approval spot for a ED follow up first?    Thanks,  Kay RN  [facility]
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
## 2022-12-27 RX ORDER — ALPRAZOLAM 0.5 MG
0.5 TABLET ORAL
Qty: 1 TABLET | Refills: 0 | Status: SHIPPED | OUTPATIENT
Start: 2022-12-27 | End: 2023-10-27

## 2023-01-17 ENCOUNTER — ANCILLARY PROCEDURE (OUTPATIENT)
Dept: MRI IMAGING | Facility: CLINIC | Age: 68
End: 2023-01-17
Attending: INTERNAL MEDICINE
Payer: COMMERCIAL

## 2023-01-17 DIAGNOSIS — M89.9 LESION OF VERTEBRA: ICD-10-CM

## 2023-01-17 PROCEDURE — 72156 MRI NECK SPINE W/O & W/DYE: CPT | Mod: GC | Performed by: RADIOLOGY

## 2023-01-17 PROCEDURE — A9585 GADOBUTROL INJECTION: HCPCS | Performed by: RADIOLOGY

## 2023-01-17 RX ORDER — GADOBUTROL 604.72 MG/ML
10 INJECTION INTRAVENOUS ONCE
Status: COMPLETED | OUTPATIENT
Start: 2023-01-17 | End: 2023-01-17

## 2023-01-17 RX ADMIN — GADOBUTROL 10 ML: 604.72 INJECTION INTRAVENOUS at 08:34

## 2023-01-17 NOTE — DISCHARGE INSTRUCTIONS
MRI Contrast Discharge Instructions    The IV contrast you received today will pass out of your body in your  urine. This will happen in the next 24 hours. You will not feel this process.  Your urine will not change color.    Drink at least 4 extra glasses of water or juice today (unless your doctor  has restricted your fluids). This reduces the stress on your kidneys.  You may take your regular medicines.    If you are on dialysis: It is best to have dialysis today.    If you have a reaction: Most reactions happen right away. If you have  any new symptoms after leaving the hospital (such as hives or swelling),  call your hospital at the correct number below. Or call your family doctor.  If you have breathing distress or wheezing, call 911.    Special instructions: ***    I have read and understand the above information.    Signature:______________________________________ Date:___________    Staff:__________________________________________ Date:___________     Time:__________    [facility] Departments:    ___[place]: [phone]  ___[facility]: [phone]  ___[place]: [phone] ___[facility]: [phone]  ___[facility]: [phone]  ___[facility]: [phone]  ___[phone]  ___[facility]: [phone]  ___[phone]

## 2023-01-18 ENCOUNTER — MYC MEDICAL ADVICE (OUTPATIENT)
Dept: FAMILY MEDICINE | Facility: CLINIC | Age: 68
End: 2023-01-18
Payer: COMMERCIAL

## 2023-01-19 ENCOUNTER — MYC MEDICAL ADVICE (OUTPATIENT)
Dept: FAMILY MEDICINE | Facility: CLINIC | Age: 68
End: 2023-01-19
Payer: COMMERCIAL

## 2023-01-19 NOTE — TELEPHONE ENCOUNTER
"Please see [employer] message and advise. Patient inquiring regarding MRI result from 1/17.    Per previous encounter from 1/18,   \"I read your notes.  Arthritis ...  I do have pain/ tightness  in my neck upper back. Would a cortizone help? \"    On result note from MRI on 1/17, provider advised \"if you have pain or numbness that shoots down the left arm, an infection into this area may help\".    AMY Bridges RN  [facility]  "

## 2023-01-19 NOTE — TELEPHONE ENCOUNTER
Duplicate encounter, please see [employer] message on 1/19.    Closing encounter.    AMY Bridges RN  [facility]

## 2023-03-10 ENCOUNTER — OFFICE VISIT (OUTPATIENT)
Dept: AUDIOLOGY | Facility: CLINIC | Age: 68
End: 2023-03-10
Payer: COMMERCIAL

## 2023-03-10 DIAGNOSIS — H90.3 SENSORINEURAL HEARING LOSS, ASYMMETRICAL: Primary | ICD-10-CM

## 2023-03-10 PROCEDURE — 99207 PR ASSESSMENT FOR HEARING AID: CPT | Performed by: AUDIOLOGIST

## 2023-03-10 NOTE — PROGRESS NOTES
AUDIOLOGY REPORT    BACKGROUND INFORMATION: Mitchell Randolph was seen in the Audiology Clinic at [facility] on 3/10/2023 for follow-up.  Previous results have revealed a bilateral asymmetric sensorineural hearing loss.  The patient has been seen previously in this clinic and was fit with binaural Phonak Audeo P50R hearing aids on 12/3/2021.  The patient reports no concerns regarding the hearing aids and is here for his annual clean and check.    TEST RESULTS AND PROCEDURES: The hearing aids were thoroughly cleaned. A listening check indicated that both devices sounded crisp and clear with no distortion or weakness noted. An electroacoustic check confirmed that the devices were functioning appropriately.     No programming changes were made today. He was given spare domes and wax traps should he need them.    SUMMARY AND RECOMMENDATIONS: A hearing aid check was performed today.  Hearing aids were found to be in good condition and functioning appropriately. It is recommended he return in about 6-8 months for a hearing test and hearing aid check. Today's appointment is a no charge appointment as the device is under warranty. Call this clinic with questions regarding today s visit.      Carla Valdez  Audiologist  [place] License  #[id number]

## 2023-06-13 ENCOUNTER — ANCILLARY PROCEDURE (OUTPATIENT)
Dept: GENERAL RADIOLOGY | Facility: CLINIC | Age: 68
End: 2023-06-13
Attending: INTERNAL MEDICINE
Payer: COMMERCIAL

## 2023-06-13 ENCOUNTER — OFFICE VISIT (OUTPATIENT)
Dept: FAMILY MEDICINE | Facility: CLINIC | Age: 68
End: 2023-06-13
Payer: COMMERCIAL

## 2023-06-13 VITALS
OXYGEN SATURATION: 94 % | TEMPERATURE: 98.4 F | RESPIRATION RATE: 20 BRPM | HEIGHT: 70 IN | HEART RATE: 70 BPM | BODY MASS INDEX: 33.07 KG/M2 | WEIGHT: 231 LBS | DIASTOLIC BLOOD PRESSURE: 77 MMHG | SYSTOLIC BLOOD PRESSURE: 157 MMHG

## 2023-06-13 DIAGNOSIS — M25.522 LEFT ELBOW PAIN: Primary | ICD-10-CM

## 2023-06-13 DIAGNOSIS — M25.522 LEFT ELBOW PAIN: ICD-10-CM

## 2023-06-13 DIAGNOSIS — Z23 NEED FOR SHINGLES VACCINE: ICD-10-CM

## 2023-06-13 PROCEDURE — 99213 OFFICE O/P EST LOW 20 MIN: CPT | Performed by: INTERNAL MEDICINE

## 2023-06-13 PROCEDURE — 73080 X-RAY EXAM OF ELBOW: CPT | Mod: TC | Performed by: RADIOLOGY

## 2023-06-13 NOTE — PROGRESS NOTES
"  Assessment & Plan   Problem List Items Addressed This Visit    None  Visit Diagnoses     Left elbow pain    -  Primary    Relevant Orders    XR Elbow Left G/E 3 Views (Completed)    Orthopedic  Referral    Need for shingles vaccine        Relevant Orders    Orthopedic  Referral                  BMI:   Estimated body mass index is 33.62 kg/m  as calculated from the following:    Height as of this encounter: 1.765 m (5' 9.5\").    Weight as of this encounter: 104.8 kg (231 lb).   Weight management plan: Discussed healthy diet and exercise guidelines    Work on weight loss  Regular exercise    Jey Joya MD  [facility] ANT Medrano is a 67 year old, presenting for the following health issues:  Elbow Pain        6/13/2023    11:39 AM   Additional Questions   Roomed by Jocelin     Elbow Pain    History of Present Illness       Reason for visit:  Left elbow pain  Symptom onset:  More than a month  Symptoms include:  Bone , muscle, tendon pain  Symptom intensity:  Moderate  Symptom progression:  Staying the same  Had these symptoms before:  No  What makes it worse:  No  What makes it better:  Have not tried ice or heat    He eats 2-3 servings of fruits and vegetables daily.He consumes 0 sweetened beverage(s) daily.He exercises with enough effort to increase his heart rate 9 or less minutes per day.  He exercises with enough effort to increase his heart rate 4 days per week.   He is taking medications regularly.     No bump in particular   Pain on rotation and lifting out   No specific injury               Review of Systems   Constitutional, HEENT, cardiovascular, pulmonary, gi and gu systems are negative, except as otherwise noted.      Objective    BP (!) 157/77 (BP Location: Left arm, Patient Position: Chair, Cuff Size: Adult Large)   Pulse 70   Temp 98.4  F (36.9  C)   Resp 20   Ht 1.765 m (5' 9.5\")   Wt 104.8 kg (231 lb)   SpO2 94%   BMI 33.62 kg/m    Body mass " index is 33.62 kg/m .  Physical Exam   GENERAL: healthy, alert and no distress  EYES: Eyes grossly normal to inspection, PERRL and conjunctivae and sclerae normal  HENT: ear canals and TM's normal, nose and mouth without ulcers or lesions  NECK: no adenopathy, no asymmetry, masses, or scars and thyroid normal to palpation  RESP: lungs clear to auscultation - no rales, rhonchi or wheezes  CV: regular rate and rhythm, normal S1 S2, no S3 or S4, no murmur, click or rub, no peripheral edema and peripheral pulses strong  ABDOMEN: soft, nontender, no hepatosplenomegaly, no masses and bowel sounds normal  MS: no gross musculoskeletal defects noted, no edema  SKIN: no suspicious lesions or rashes  NEURO: Normal strength and tone, mentation intact and speech normal  BACK: no CVA tenderness, no paralumbar tenderness  PSYCH: mentation appears normal, affect normal/bright  LYMPH: no cervical, supraclavicular, axillary, or inguinal adenopathy    Results for orders placed or performed in visit on 06/13/23   XR Elbow Left G/E 3 Views     Status: None    Narrative    ELBOW THREE VIEWS LEFT  6/13/2023 12:05 PM     HISTORY: Left elbow pain  COMPARISON: None.    FINDINGS: There is no significant degenerative change. No posterior  fat-pad is seen. No convincing anterior sail sign is seen. There is no  acute fracture or dislocation. There are no worrisome bony lesions.      Impression    IMPRESSION: No acute osseous abnormality demonstrated.    ANIL ESCOBAR MD         SYSTEM ID:  [id number]

## 2023-06-19 ENCOUNTER — OFFICE VISIT (OUTPATIENT)
Dept: ORTHOPEDICS | Facility: CLINIC | Age: 68
End: 2023-06-19
Attending: INTERNAL MEDICINE
Payer: COMMERCIAL

## 2023-06-19 VITALS
BODY MASS INDEX: 33.62 KG/M2 | DIASTOLIC BLOOD PRESSURE: 72 MMHG | WEIGHT: 231 LBS | HEART RATE: 56 BPM | SYSTOLIC BLOOD PRESSURE: 169 MMHG

## 2023-06-19 DIAGNOSIS — M77.12 LEFT TENNIS ELBOW: ICD-10-CM

## 2023-06-19 PROCEDURE — 99204 OFFICE O/P NEW MOD 45 MIN: CPT | Performed by: PEDIATRICS

## 2023-06-19 ASSESSMENT — PAIN SCALES - GENERAL: PAINLEVEL: MILD PAIN (3)

## 2023-06-19 NOTE — LETTER
6/19/2023         RE: Mitchell Randolph  [address] [facility] [address]        Dear Colleague,    Thank you for referring your patient, Mitchell Randolph, to the [facility]. Please see a copy of my visit note below.    ASSESSMENT & PLAN    Mitchell was seen today for pain.    Diagnoses and all orders for this visit:    Need for shingles vaccine  -     Orthopedic  Referral    Left elbow pain  -     Orthopedic  Referral      This issue is chronic and Unchanged.      Discussed the causes and treatment of lateral epicondylitis including ice, heat, stretching, massage, over the counter anti-inflammatory medications, elbow strap, and wrist brace use. Discussed the importance of eccentric strengthening - home exercise program or hand therapy appointment.  Rest as possible from activities that cause pain.  Home handouts provided.    Plan:  - Today's Plan of Care:  Home Exercise Program - eccentric strengthening    Trial of elbow strap    Discussed activity considerations and other supportive care including Ice/Heat, OTC and other topical medications as needed.    -We also discussed other future treatment options:  Referral to Occupational Therapy  MRI if more severe    Follow Up: as needed  - follow up with PCP for high blood pressure    Concerning signs and symptoms were reviewed and all questions were answered at this time.    Veronika Mcgill MD [facility]  Sports Medicine Physician  [facility]      -----  Chief Complaint   Patient presents with     Left Elbow - Pain       SUBJECTIVE  Mitchell Randolph is a/an 67 year old male who is seen in consultation at the request of Jey Joya M.D. for evaluation of Left elbow pain.     The patient is seen by themselves.  The patient is Right handed    Onset: 2+ month(s) ago. Patient describes injury as shoveling snow on April 1st, extending arm to throw snow  Location of Pain:  left lateral elbow   Worsened by:  use, holding Grandson, 7 mo  Better with: nothing  Treatments tried: no treatment tried to date  Associated symptoms: no distal numbness or tingling; denies swelling or warmth and catch/pain    Orthopedic/Surgical history: NO  Social History/Occupation: , [employer]      REVIEW OF SYSTEMS:  Review of Systems    OBJECTIVE:  BP (!) 169/72   Pulse 56   Wt 104.8 kg (231 lb)   BMI 33.62 kg/m     General: healthy, alert and in no distress  Skin: no suspicious lesions or rash.  CV: distal perfusion intact  Resp: normal respiratory effort without conversational dyspnea   Psych: normal mood and affect  Gait: NORMAL  Neuro: Normal light sensory exam of upper extremity    Bilateral Elbow exam:    Inspection:     no ecchymosis       no edema or effusion    Tender:     lateral epicondyle left    Non-Tender:      remainder of the elbow bilaterally    ROM:      full with flexion, extension, forearm supination and pronation bilateral    Strength:     flexion 5/5 bilateral       extension 5/5 bilateral       forearm supination 5/5 bilateral       forearm pronation 5/5 bilateral       pain with resisted wrist extension and supination left    Special Tests:      tennis elbow test (resisted extension of third digit) positive (+) left    Sensation:     intact throughout hand       intact throughout forearm    RADIOLOGY:  Final results and radiologist's interpretation, available in the [facility] health record.  Images were reviewed with the patient in the office today.  My personal interpretation of the performed imaging:     Reviewed left elbow x-rays 6/13/2023 - no acute bony abnormality, no significant degenerative change    Reviewed PCP note  Review of the result(s) of each unique test - XR             Again, thank you for allowing me to participate in the care of your patient.        Sincerely,        Veronika Mcgill MD

## 2023-06-19 NOTE — PATIENT INSTRUCTIONS
Discussed the causes and treatment of lateral epicondylitis including ice, heat, stretching, massage, over the counter anti-inflammatory medications, elbow strap, and wrist brace use. Discussed the importance of eccentric strengthening - home exercise program or hand therapy appointment.  Rest as possible from activities that cause pain.  Home handouts provided.    Plan:  - Today's Plan of Care:  Home Exercise Program - eccentric strengthening    Trial of elbow strap    Discussed activity considerations and other supportive care including Ice/Heat, OTC and other topical medications as needed.    -We also discussed other future treatment options:  Referral to Occupational Therapy  MRI if more severe    Follow Up: as needed  - follow up with PCP for high blood pressure    If you have any further questions for your physician or physician s care team you can call [phone] and use option 3 to leave a voice message.

## 2023-06-19 NOTE — PROGRESS NOTES
ASSESSMENT & PLAN    Mitchell was seen today for pain.    Diagnoses and all orders for this visit:    Need for shingles vaccine  -     Orthopedic  Referral    Left elbow pain  -     Orthopedic  Referral      This issue is chronic and Unchanged.      Discussed the causes and treatment of lateral epicondylitis including ice, heat, stretching, massage, over the counter anti-inflammatory medications, elbow strap, and wrist brace use. Discussed the importance of eccentric strengthening - home exercise program or hand therapy appointment.  Rest as possible from activities that cause pain.  Home handouts provided.    Plan:  - Today's Plan of Care:  Home Exercise Program - eccentric strengthening    Trial of elbow strap    Discussed activity considerations and other supportive care including Ice/Heat, OTC and other topical medications as needed.    -We also discussed other future treatment options:  Referral to Occupational Therapy  MRI if more severe    Follow Up: as needed  - follow up with PCP for high blood pressure    Concerning signs and symptoms were reviewed and all questions were answered at this time.    Veronika Mcgill MD [facility]  Sports Medicine Physician  [facility]      -----  Chief Complaint   Patient presents with     Left Elbow - Pain       SUBJECTIVE  Mitchell Randolph is a/an 67 year old male who is seen in consultation at the request of Jey Joya M.D. for evaluation of Left elbow pain.     The patient is seen by themselves.  The patient is Right handed    Onset: 2+ month(s) ago. Patient describes injury as shoveling snow on April 1st, extending arm to throw snow  Location of Pain:  left lateral elbow   Worsened by: use, holding Grandson, 7 mo  Better with: nothing  Treatments tried: no treatment tried to date  Associated symptoms: no distal numbness or tingling; denies swelling or warmth and catch/pain    Orthopedic/Surgical history: NO  Social History/Occupation: quality  , [employer]      REVIEW OF SYSTEMS:  Review of Systems    OBJECTIVE:  BP (!) 169/72   Pulse 56   Wt 104.8 kg (231 lb)   BMI 33.62 kg/m     General: healthy, alert and in no distress  Skin: no suspicious lesions or rash.  CV: distal perfusion intact  Resp: normal respiratory effort without conversational dyspnea   Psych: normal mood and affect  Gait: NORMAL  Neuro: Normal light sensory exam of upper extremity    Bilateral Elbow exam:    Inspection:     no ecchymosis       no edema or effusion    Tender:     lateral epicondyle left    Non-Tender:      remainder of the elbow bilaterally    ROM:      full with flexion, extension, forearm supination and pronation bilateral    Strength:     flexion 5/5 bilateral       extension 5/5 bilateral       forearm supination 5/5 bilateral       forearm pronation 5/5 bilateral       pain with resisted wrist extension and supination left    Special Tests:      tennis elbow test (resisted extension of third digit) positive (+) left    Sensation:     intact throughout hand       intact throughout forearm    RADIOLOGY:  Final results and radiologist's interpretation, available in the [facility] health record.  Images were reviewed with the patient in the office today.  My personal interpretation of the performed imaging:     Reviewed left elbow x-rays 6/13/2023 - no acute bony abnormality, no significant degenerative change    Reviewed PCP note  Review of the result(s) of each unique test - XR

## 2023-10-25 ASSESSMENT — ENCOUNTER SYMPTOMS
NAUSEA: 0
CHILLS: 0
SHORTNESS OF BREATH: 0
CONSTIPATION: 0
DYSURIA: 0
PALPITATIONS: 0
DIARRHEA: 0
WEAKNESS: 0
HEMATOCHEZIA: 0
MYALGIAS: 0
ABDOMINAL PAIN: 0
PARESTHESIAS: 0
HEARTBURN: 0
FREQUENCY: 0
COUGH: 0
HEADACHES: 0
FEVER: 0
ARTHRALGIAS: 0
JOINT SWELLING: 0
NERVOUS/ANXIOUS: 0
EYE PAIN: 0
HEMATURIA: 0
SORE THROAT: 0
DIZZINESS: 0

## 2023-10-25 ASSESSMENT — ACTIVITIES OF DAILY LIVING (ADL): CURRENT_FUNCTION: NO ASSISTANCE NEEDED

## 2023-10-27 ENCOUNTER — OFFICE VISIT (OUTPATIENT)
Dept: FAMILY MEDICINE | Facility: CLINIC | Age: 68
End: 2023-10-27
Payer: COMMERCIAL

## 2023-10-27 VITALS
OXYGEN SATURATION: 96 % | DIASTOLIC BLOOD PRESSURE: 87 MMHG | TEMPERATURE: 98.7 F | HEART RATE: 67 BPM | HEIGHT: 70 IN | SYSTOLIC BLOOD PRESSURE: 148 MMHG | WEIGHT: 231 LBS | BODY MASS INDEX: 33.07 KG/M2 | RESPIRATION RATE: 18 BRPM

## 2023-10-27 DIAGNOSIS — I10 HYPERTENSION WITH GOAL BLOOD PRESSURE LESS THAN 140/90: ICD-10-CM

## 2023-10-27 DIAGNOSIS — Z12.11 ENCOUNTER FOR SCREENING COLONOSCOPY: Primary | ICD-10-CM

## 2023-10-27 DIAGNOSIS — E78.5 HYPERLIPIDEMIA LDL GOAL <130: ICD-10-CM

## 2023-10-27 LAB
ALBUMIN SERPL BCG-MCNC: 4.6 G/DL (ref 3.5–5.2)
ALP SERPL-CCNC: 85 U/L (ref 40–129)
ALT SERPL W P-5'-P-CCNC: 36 U/L (ref 0–70)
ANION GAP SERPL CALCULATED.3IONS-SCNC: 11 MMOL/L (ref 7–15)
AST SERPL W P-5'-P-CCNC: 23 U/L (ref 0–45)
BILIRUB SERPL-MCNC: 0.7 MG/DL
BUN SERPL-MCNC: 24 MG/DL (ref 8–23)
CALCIUM SERPL-MCNC: 9.6 MG/DL (ref 8.8–10.2)
CHLORIDE SERPL-SCNC: 103 MMOL/L (ref 98–107)
CHOLEST SERPL-MCNC: 185 MG/DL
CREAT SERPL-MCNC: 1.04 MG/DL (ref 0.67–1.17)
DEPRECATED HCO3 PLAS-SCNC: 27 MMOL/L (ref 22–29)
EGFRCR SERPLBLD CKD-EPI 2021: 79 ML/MIN/1.73M2
GLUCOSE SERPL-MCNC: 103 MG/DL (ref 70–99)
HDLC SERPL-MCNC: 42 MG/DL
LDLC SERPL CALC-MCNC: 121 MG/DL
NONHDLC SERPL-MCNC: 143 MG/DL
POTASSIUM SERPL-SCNC: 4.7 MMOL/L (ref 3.4–5.3)
PROT SERPL-MCNC: 7 G/DL (ref 6.4–8.3)
SODIUM SERPL-SCNC: 141 MMOL/L (ref 135–145)
TRIGL SERPL-MCNC: 110 MG/DL

## 2023-10-27 PROCEDURE — 80053 COMPREHEN METABOLIC PANEL: CPT | Performed by: INTERNAL MEDICINE

## 2023-10-27 PROCEDURE — 99397 PER PM REEVAL EST PAT 65+ YR: CPT | Mod: 25 | Performed by: INTERNAL MEDICINE

## 2023-10-27 PROCEDURE — 90662 IIV NO PRSV INCREASED AG IM: CPT | Performed by: INTERNAL MEDICINE

## 2023-10-27 PROCEDURE — 36415 COLL VENOUS BLD VENIPUNCTURE: CPT | Performed by: INTERNAL MEDICINE

## 2023-10-27 PROCEDURE — 90471 IMMUNIZATION ADMIN: CPT | Performed by: INTERNAL MEDICINE

## 2023-10-27 PROCEDURE — 80061 LIPID PANEL: CPT | Performed by: INTERNAL MEDICINE

## 2023-10-27 RX ORDER — LISINOPRIL 40 MG/1
40 TABLET ORAL DAILY
Qty: 90 TABLET | Refills: 4 | Status: SHIPPED | OUTPATIENT
Start: 2023-10-27 | End: 2024-09-30

## 2023-10-27 RX ORDER — SIMVASTATIN 40 MG
40 TABLET ORAL AT BEDTIME
Qty: 90 TABLET | Refills: 4 | Status: SHIPPED | OUTPATIENT
Start: 2023-10-27 | End: 2023-11-01

## 2023-10-27 RX ORDER — AMLODIPINE BESYLATE 2.5 MG/1
2.5 TABLET ORAL DAILY
Qty: 90 TABLET | Refills: 4 | Status: SHIPPED | OUTPATIENT
Start: 2023-10-27 | End: 2023-11-01

## 2023-10-27 ASSESSMENT — ENCOUNTER SYMPTOMS
FREQUENCY: 0
WEAKNESS: 0
FEVER: 0
NAUSEA: 0
ARTHRALGIAS: 0
NERVOUS/ANXIOUS: 0
SORE THROAT: 0
ABDOMINAL PAIN: 0
PARESTHESIAS: 0
JOINT SWELLING: 0
DIZZINESS: 0
PALPITATIONS: 0
COUGH: 0
CHILLS: 0
DYSURIA: 0
EYE PAIN: 0
SHORTNESS OF BREATH: 0
HEMATOCHEZIA: 0
HEMATURIA: 0
CONSTIPATION: 0
DIARRHEA: 0
HEARTBURN: 0
HEADACHES: 0
MYALGIAS: 0

## 2023-10-27 ASSESSMENT — ACTIVITIES OF DAILY LIVING (ADL): CURRENT_FUNCTION: NO ASSISTANCE NEEDED

## 2023-10-27 NOTE — PROGRESS NOTES
"SUBJECTIVE:   Mitchell is a 68 year old who presents for Preventive Visit.      10/27/2023     8:44 AM   Additional Questions   Roomed by Jocelin     Are you in the first 12 months of your Medicare coverage?  No    Healthy Habits:     In general, how would you rate your overall health?  Fair    Frequency of exercise:  1 day/week    Duration of exercise:  15-30 minutes    Do you usually eat at least 4 servings of fruit and vegetables a day, include whole grains    & fiber and avoid regularly eating high fat or \"junk\" foods?  Yes    Taking medications regularly:  Yes    Barriers to taking medications:  None    Medication side effects:  None    Ability to successfully perform activities of daily living:  No assistance needed    Home Safety:  Lack of grab bars in the bathroom    Hearing Impairment:  Difficulty following a conversation in a noisy restaurant or crowded room    In the past 6 months, have you been bothered by leaking of urine?  No    In general, how would you rate your overall mental or emotional health?  Good    Additional concerns today:  No    Have you ever done Advance Care Planning? (For example, a Health Directive, POLST, or a discussion with a medical provider or your loved ones about your wishes): No, advance care planning information given to patient to review.  Patient plans to discuss their wishes with loved ones or provider.       Work time, exercise has been down .   10, 000 steps per day   Sleep 9 -4:30 ,   Fall risk  Fallen 2 or more times in the past year?: No  Any fall with injury in the past year?: No    Cognitive Screening   1) Repeat 3 items (Leader, Season, Table)    2) Clock draw: NORMAL  3) 3 item recall: Recalls 2 objects   Results: NORMAL clock, 1-2 items recalled: COGNITIVE IMPAIRMENT LESS LIKELY    Mini-CogTM Copyright MANI Castellanos. Licensed by the author for use in [facility]; reprinted with permission (marly@.[facility]). All rights reserved.      Do you have sleep apnea, excessive " snoring or daytime drowsiness? : yes    Reviewed and updated as needed this visit by clinical staff   Tobacco  Allergies  Meds  Problems             Reviewed and updated as needed this visit by Provider    Allergies  Meds  Problems            Social History     Tobacco Use    Smoking status: Never     Passive exposure: Never    Smokeless tobacco: Never   Substance Use Topics    Alcohol use: Yes     Comment: beer once in a while             10/25/2023     4:08 PM   Alcohol Use   Prescreen: >3 drinks/day or >7 drinks/week? No     Do you have a current opioid prescription? No  Do you use any other controlled substances or medications that are not prescribed by a provider? None              Current providers sharing in care for this patient include:   Patient Care Team:  Jey Joya MD as PCP - General (Internal Medicine - Pediatrics)  Radha Branch AuD as Audiologist (Audiology)  Susana Cueva AuD as Audiologist (Audiology)  Jey Joya MD as Referring Physician (Internal Medicine)  Jey Joya MD as Assigned PCP  Susana Castelan AuD as Audiologist (Audiology)  Jey Gillette DPM as Assigned Surgical Provider  Veronika Mcgill MD as Assigned Musculoskeletal Provider    The following health maintenance items are reviewed in Epic and correct as of today:  Health Maintenance   Topic Date Due    ZOSTER IMMUNIZATION (1 of 2) Never done    RSV VACCINE 60+ (1 - 1-dose 60+ series) Never done    Pneumococcal Vaccine: 65+ Years (1 - PCV) Never done    AORTIC ANEURYSM SCREENING (SYSTEM ASSIGNED)  Never done    COVID-19 Vaccine (5 - 2023-24 season) 09/01/2023    MEDICARE ANNUAL WELLNESS VISIT  09/19/2023    ANNUAL REVIEW OF HM ORDERS  10/27/2024    FALL RISK ASSESSMENT  10/27/2024    LIPID  09/19/2027    COLORECTAL CANCER SCREENING  05/04/2028    ADVANCE CARE PLANNING  10/27/2028    DTAP/TDAP/TD IMMUNIZATION (3 - Td or Tdap) 09/04/2030    HEPATITIS C SCREENING  Completed    PHQ-2 (once  per calendar year)  Completed    INFLUENZA VACCINE  Completed    IPV IMMUNIZATION  Aged Out    HPV IMMUNIZATION  Aged Out    MENINGITIS IMMUNIZATION  Aged Out     Lab work is in process  Labs reviewed in EPIC  BP Readings from Last 3 Encounters:   10/27/23 (!) 148/87   06/19/23 (!) 169/72   06/13/23 (!) 157/77    Wt Readings from Last 3 Encounters:   10/27/23 104.8 kg (231 lb)   06/19/23 104.8 kg (231 lb)   06/13/23 104.8 kg (231 lb)                  Patient Active Problem List   Diagnosis    Slow urinary stream    HYPERLIPIDEMIA LDL GOAL <130    Tinea corporis    Advanced directives, counseling/discussion    Hypertension with goal blood pressure less than 140/90    Family history of prostate cancer in father     Past Surgical History:   Procedure Laterality Date    ORTHOPEDIC SURGERY  Mar 2019    TONSILLECTOMY  age 6 or 7       Social History     Tobacco Use    Smoking status: Never     Passive exposure: Never    Smokeless tobacco: Never   Substance Use Topics    Alcohol use: Yes     Comment: beer once in a while     Family History   Problem Relation Age of Onset    Cancer Maternal Grandfather         bone         Current Outpatient Medications   Medication Sig Dispense Refill    amLODIPine (NORVASC) 2.5 MG tablet Take 1 tablet (2.5 mg) by mouth daily 90 tablet 4    fluconazole (DIFLUCAN) 200 MG tablet Take 1 tablet (200 mg) by mouth daily 2 tablets as needed, repeat 2 tablets in 2 weeks 4 tablet 6    lisinopril (ZESTRIL) 40 MG tablet Take 1 tablet (40 mg) by mouth daily 90 tablet 4    simvastatin (ZOCOR) 40 MG tablet Take 1 tablet (40 mg) by mouth at bedtime 90 tablet 4     No Known Allergies          Review of Systems   Constitutional:  Negative for chills and fever.   HENT:  Negative for congestion, ear pain, hearing loss and sore throat.    Eyes:  Negative for pain and visual disturbance.   Respiratory:  Negative for cough and shortness of breath.    Cardiovascular:  Negative for chest pain, palpitations and  "peripheral edema.   Gastrointestinal:  Negative for abdominal pain, constipation, diarrhea, heartburn, hematochezia and nausea.   Genitourinary:  Negative for dysuria, frequency, genital sores, hematuria, impotence, penile discharge and urgency.   Musculoskeletal:  Negative for arthralgias, joint swelling and myalgias.   Skin:  Negative for rash.   Neurological:  Negative for dizziness, weakness, headaches and paresthesias.   Psychiatric/Behavioral:  Negative for mood changes. The patient is not nervous/anxious.      Constitutional, HEENT, cardiovascular, pulmonary, gi and gu systems are negative, except as otherwise noted.    OBJECTIVE:   BP (!) 148/87   Pulse 67   Temp 98.7  F (37.1  C)   Resp 18   Ht 1.765 m (5' 9.5\")   Wt 104.8 kg (231 lb)   SpO2 96%   BMI 33.62 kg/m   Estimated body mass index is 33.62 kg/m  as calculated from the following:    Height as of this encounter: 1.765 m (5' 9.5\").    Weight as of this encounter: 104.8 kg (231 lb).  Physical Exam  GENERAL: healthy, alert and no distress  EYES: Eyes grossly normal to inspection, PERRL and conjunctivae and sclerae normal  HENT: ear canals and TM's normal, nose and mouth without ulcers or lesions  NECK: no adenopathy, no asymmetry, masses, or scars and thyroid normal to palpation  RESP: lungs clear to auscultation - no rales, rhonchi or wheezes  CV: regular rate and rhythm, normal S1 S2, no S3 or S4, no murmur, click or rub, no peripheral edema and peripheral pulses strong  ABDOMEN: soft, nontender, no hepatosplenomegaly, no masses and bowel sounds normal  MS: no gross musculoskeletal defects noted, no edema  SKIN: no suspicious lesions or rashes  NEURO: Normal strength and tone, mentation intact and speech normal  BACK: no CVA tenderness, no paralumbar tenderness  PSYCH: mentation appears normal, affect normal/bright  LYMPH: no cervical, supraclavicular, axillary, or inguinal adenopathy    Diagnostic Test Results:  Labs reviewed in Epic  No " results found for any visits on 10/27/23.    ASSESSMENT / PLAN:   Mitchell was seen today for physical.    Diagnoses and all orders for this visit:    Encounter for screening colonoscopy  -     Colonoscopy Screening  Referral; Future    Hypertension with goal blood pressure less than 140/90  -     Comprehensive metabolic panel (BMP + Alb, Alk Phos, ALT, AST, Total. Bili, TP); Future  -     amLODIPine (NORVASC) 2.5 MG tablet; Take 1 tablet (2.5 mg) by mouth daily  -     lisinopril (ZESTRIL) 40 MG tablet; Take 1 tablet (40 mg) by mouth daily  -     Comprehensive metabolic panel (BMP + Alb, Alk Phos, ALT, AST, Total. Bili, TP)    Hyperlipidemia LDL goal <130  -     Lipid panel reflex to direct LDL Fasting; Future  -     simvastatin (ZOCOR) 40 MG tablet; Take 1 tablet (40 mg) by mouth at bedtime  -     Lipid panel reflex to direct LDL Fasting    Other orders  -     INFLUENZA VACCINE 65+ (FLUZONE HD)  -     REVIEW OF HEALTH MAINTENANCE PROTOCOL ORDERS              COUNSELING:  Reviewed preventive health counseling, as reflected in patient instructions       Regular exercise       Healthy diet/nutrition       Vision screening       Hearing screening       Bladder control       Fall risk prevention       Colon cancer screening        He reports that he has never smoked. He has never been exposed to tobacco smoke. He has never used smokeless tobacco.      Appropriate preventive services were discussed with this patient, including applicable screening as appropriate for fall prevention, nutrition, physical activity, Tobacco-use cessation, weight loss and cognition.  Checklist reviewing preventive services available has been given to the patient.    Reviewed patients plan of care and provided an AVS. The Basic Care Plan (routine screening as documented in Health Maintenance) for Mitchell meets the Care Plan requirement. This Care Plan has been established and reviewed with the Patient.          Jey Joya MD  [facility]  [facility] ANT    Identified Health Risks:  I have reviewed Opioid Use Disorder and Substance Use Disorder risk factors and made any needed referrals.

## 2023-11-01 ENCOUNTER — MYC MEDICAL ADVICE (OUTPATIENT)
Dept: FAMILY MEDICINE | Facility: CLINIC | Age: 68
End: 2023-11-01
Payer: COMMERCIAL

## 2023-11-01 DIAGNOSIS — E78.5 HYPERLIPIDEMIA LDL GOAL <130: Primary | ICD-10-CM

## 2023-11-01 DIAGNOSIS — I10 HYPERTENSION WITH GOAL BLOOD PRESSURE LESS THAN 140/90: ICD-10-CM

## 2023-11-01 RX ORDER — AMLODIPINE BESYLATE AND ATORVASTATIN CALCIUM 2.5; 4 MG/1; MG/1
1 TABLET, FILM COATED ORAL DAILY
Qty: 90 TABLET | Refills: 4 | Status: SHIPPED | OUTPATIENT
Start: 2023-11-01 | End: 2024-09-30

## 2023-11-01 NOTE — TELEPHONE ENCOUNTER
I will change the simvastatin to atorvastatin and switch to the combination amlodipine/atorvastatin pill

## 2023-11-01 NOTE — TELEPHONE ENCOUNTER
Called [facility] for further clarification.     [facility] requesting clarification for simvastatin 40 mg. They would like to verify whether provider would still like medication to be filled due to interaction between amlodipine and simvastatin. Pharmacist explains the typical dose of simvastatin taken with amlodipine typically does not exceed 20 mg.     Reference # [id number]    Patient is also requesting a combination pill for hypertension. Patient last seen in office on 10/27.    Routing to PCP to please advise.    AMY García RN  [facility]

## 2023-11-06 NOTE — TELEPHONE ENCOUNTER
Any preference for morning or evening dosing for the C[place]et?  Is patient supposed to be continuing lisinopril?    Martha Moreland RN   [facility]

## 2024-03-14 ENCOUNTER — MYC MEDICAL ADVICE (OUTPATIENT)
Dept: FAMILY MEDICINE | Facility: CLINIC | Age: 69
End: 2024-03-14
Payer: COMMERCIAL

## 2024-03-14 NOTE — TELEPHONE ENCOUNTER
Patient Quality Outreach    Patient is due for the following:   Hypertension -  Hypertension follow-up visit      Topic Date Due    Zoster (Shingles) Vaccine (1 of 2) Never done    Pneumococcal Vaccine (1 of 1 - PCV) Never done    COVID-19 Vaccine (5 - 2023-24 season) 09/01/2023       Next Steps:   Schedule a office visit for HTN Recheck    Type of outreach:    Sent [employer] message.      Questions for provider review:    None           Magdalena Beauchamp CMA  Chart routed to Care Team.

## 2024-04-08 ENCOUNTER — ALLIED HEALTH/NURSE VISIT (OUTPATIENT)
Dept: FAMILY MEDICINE | Facility: CLINIC | Age: 69
End: 2024-04-08
Payer: COMMERCIAL

## 2024-04-08 VITALS — SYSTOLIC BLOOD PRESSURE: 144 MMHG | DIASTOLIC BLOOD PRESSURE: 84 MMHG

## 2024-04-08 DIAGNOSIS — I10 HYPERTENSION WITH GOAL BLOOD PRESSURE LESS THAN 140/90: Primary | ICD-10-CM

## 2024-04-08 PROCEDURE — 99207 PR NO CHARGE NURSE ONLY: CPT

## 2024-04-08 NOTE — NURSING NOTE
Mitchell Randolph is a 68 year old patient who comes in today for a Blood Pressure check.  Initial BP:  BP (!) 144/84 (BP Location: Left arm, Patient Position: Sitting, Cuff Size: Adult Large)      Data Unavailable  Disposition: follow-up as previously indicated by provider    Scheduled office visit 4/26/24    Ashtyn Rendon CMA

## 2024-04-10 ENCOUNTER — MYC MEDICAL ADVICE (OUTPATIENT)
Dept: FAMILY MEDICINE | Facility: CLINIC | Age: 69
End: 2024-04-10
Payer: COMMERCIAL

## 2024-04-11 NOTE — TELEPHONE ENCOUNTER
Please review [employer] message and advise if any recommendations.     BP 04/08 at check with MA was 144/84.    Martha Moreland RN

## 2024-05-09 ENCOUNTER — MYC MEDICAL ADVICE (OUTPATIENT)
Dept: FAMILY MEDICINE | Facility: CLINIC | Age: 69
End: 2024-05-09
Payer: COMMERCIAL

## 2024-05-09 NOTE — TELEPHONE ENCOUNTER
Routing My Chart message to Dr. Joya.    Patient anting to push out appointment.    Ananda Monae, RN, BSN, PHN  [facility]

## 2024-05-16 ENCOUNTER — MYC MEDICAL ADVICE (OUTPATIENT)
Dept: FAMILY MEDICINE | Facility: CLINIC | Age: 69
End: 2024-05-16
Payer: COMMERCIAL

## 2024-05-16 NOTE — PROGRESS NOTES
AUDIOLOGY REPORT    SUBJECTIVE: Mitchell Randolph is a 68 year old male who was seen at the [facility] on 5/17/24 for audiologic evaluation, referred by, self. The patient has been seen previously in this clinic on 11/11/2021 for assessment and results indicated normal sloping to moderate asymmetric sensorineural hearing loss bilaterally, right ear worse.  He has been seen previously in this clinic and was fit with binaural Phonak Audeo P50-R hearing aids on 12/3/2021.  The patient reports no changes in hearing and denies all otologic symptoms.         OBJECTIVE:  Abuse Screening:  Do you feel unsafe at home or work/school? No  Do you feel threatened by someone? No  Does anyone try to keep you from having contact with others, or doing things outside of your home? No  Physical signs of abuse present? No     Fall Risk Screen:  1. Have you fallen two or more times in the past year? No  2. Have you fallen and had an injury in the past year? No    Timed Up and Go Score (in seconds): Not tested  Is patient a fall risk?   Referral initiated: No  Fall Risk Assessment Completed by Audiology    Otoscopic exam indicates clear canals    Pure Tone Thresholds assessed using conventional audiometry with good reliability from 250-8000 Hz bilaterally using insert earphones and circumaural headphones.     RIGHT:  Normal sloping to moderately-severe rising to mild sensorineural hearing loss with asymmetries from 5394-3801 Hz    LEFT:    Normal sloping to moderately-severe rising to moderate sensorineural hearing loss with 15-20 dB asymmetries from 2658-2863 Hz    Tympanogram:    RIGHT: normal eardrum mobility    LEFT:   normal eardrum mobility    Reflexes (reported by stimulus ear): 1000 Hz  RIGHT: Ipsilateral is present at normal levels  RIGHT: Contralateral is absent at frequencies tested  LEFT:   Ipsilateral is absent at frequencies tested  LEFT:   Contralateral is present at elevated  level    Speech Reception Threshold:    RIGHT: 25 dB HL    LEFT:   15 dB HL    Word Recognition Score:     RIGHT: 84% at 70 dB HL using NU-6 recorded word list.    LEFT:   92% at 60 dB HL using NU-6 recorded word list.      Hearing aid check: Hearing aids were deep cleaned. Exchanged 2S receivers for a right 2S and a left 1S as we did not have a left 2S in stock. Sound quality improved after changing the receivers.     ASSESSMENT: Today's results reveal normal to moderately-severe asymmetric sensorineural hearing loss bilaterally. Compared to their previous audiogram dated 11/11/2021, hearing has remained stable in both ears. Today s results were discussed with the patient in detail.     PLAN: Will call patient to exchange the left 1S to a 2S . It is recommended that the patient drop off his hearing aids to be sent into the  for the final in warranty check in November 2024. He should return to this clinic for continued audiologic monitoring if new concerns arise. Please call this clinic with questions regarding these results or recommendations.      Wander Vale, CCC-A  Clinical Audiologist  [place] #[id number]

## 2024-05-17 ENCOUNTER — OFFICE VISIT (OUTPATIENT)
Dept: AUDIOLOGY | Facility: CLINIC | Age: 69
End: 2024-05-17
Payer: COMMERCIAL

## 2024-05-17 DIAGNOSIS — H90.3 SENSORINEURAL HEARING LOSS, ASYMMETRICAL: Primary | ICD-10-CM

## 2024-05-17 PROCEDURE — 92557 COMPREHENSIVE HEARING TEST: CPT | Performed by: AUDIOLOGIST

## 2024-05-17 PROCEDURE — 92550 TYMPANOMETRY & REFLEX THRESH: CPT | Performed by: AUDIOLOGIST

## 2024-05-17 NOTE — TELEPHONE ENCOUNTER
"Please see [employer] message with BP readings:    \"BP   taken around 5PM the past couple nights.    5/12 - 133/86;    5/13 -139/76;   5/14- 129/79   5/15 -130/92    I still want to check/compare my readings with my onsite work nurse.  I will keep taking /recording BP daily.\"    Writer has recorded readings in chart.    Thanks,  KAMRAN IrbyN RN  [facility]    "

## 2024-06-05 ENCOUNTER — MYC MEDICAL ADVICE (OUTPATIENT)
Dept: FAMILY MEDICINE | Facility: CLINIC | Age: 69
End: 2024-06-05
Payer: COMMERCIAL

## 2024-07-09 ENCOUNTER — MYC MEDICAL ADVICE (OUTPATIENT)
Dept: FAMILY MEDICINE | Facility: CLINIC | Age: 69
End: 2024-07-09
Payer: COMMERCIAL

## 2024-07-09 ENCOUNTER — MYC REFILL (OUTPATIENT)
Dept: FAMILY MEDICINE | Facility: CLINIC | Age: 69
End: 2024-07-09
Payer: COMMERCIAL

## 2024-07-09 DIAGNOSIS — B35.4 TINEA CORPORIS: ICD-10-CM

## 2024-07-09 RX ORDER — FLUCONAZOLE 200 MG/1
200 TABLET ORAL DAILY
Qty: 4 TABLET | Refills: 6 | Status: SHIPPED | OUTPATIENT
Start: 2024-07-09

## 2024-09-26 SDOH — HEALTH STABILITY: PHYSICAL HEALTH: ON AVERAGE, HOW MANY MINUTES DO YOU ENGAGE IN EXERCISE AT THIS LEVEL?: 40 MIN

## 2024-09-26 SDOH — HEALTH STABILITY: PHYSICAL HEALTH: ON AVERAGE, HOW MANY DAYS PER WEEK DO YOU ENGAGE IN MODERATE TO STRENUOUS EXERCISE (LIKE A BRISK WALK)?: 3 DAYS

## 2024-09-26 ASSESSMENT — SOCIAL DETERMINANTS OF HEALTH (SDOH): HOW OFTEN DO YOU GET TOGETHER WITH FRIENDS OR RELATIVES?: TWICE A WEEK

## 2024-09-30 ENCOUNTER — OFFICE VISIT (OUTPATIENT)
Dept: FAMILY MEDICINE | Facility: CLINIC | Age: 69
End: 2024-09-30
Payer: COMMERCIAL

## 2024-09-30 VITALS
HEIGHT: 70 IN | RESPIRATION RATE: 12 BRPM | HEART RATE: 55 BPM | SYSTOLIC BLOOD PRESSURE: 136 MMHG | OXYGEN SATURATION: 98 % | BODY MASS INDEX: 32.64 KG/M2 | WEIGHT: 228 LBS | DIASTOLIC BLOOD PRESSURE: 78 MMHG | TEMPERATURE: 97.1 F

## 2024-09-30 DIAGNOSIS — E78.5 HYPERLIPIDEMIA LDL GOAL <130: ICD-10-CM

## 2024-09-30 DIAGNOSIS — E55.9 VITAMIN D DEFICIENCY: Primary | ICD-10-CM

## 2024-09-30 DIAGNOSIS — Z12.5 SPECIAL SCREENING FOR MALIGNANT NEOPLASM OF PROSTATE: ICD-10-CM

## 2024-09-30 DIAGNOSIS — I10 HYPERTENSION WITH GOAL BLOOD PRESSURE LESS THAN 140/90: ICD-10-CM

## 2024-09-30 LAB
BASOPHILS # BLD AUTO: 0 10E3/UL (ref 0–0.2)
BASOPHILS NFR BLD AUTO: 0 %
EOSINOPHIL # BLD AUTO: 0.1 10E3/UL (ref 0–0.7)
EOSINOPHIL NFR BLD AUTO: 2 %
ERYTHROCYTE [DISTWIDTH] IN BLOOD BY AUTOMATED COUNT: 13.4 % (ref 10–15)
HCT VFR BLD AUTO: 48.1 % (ref 40–53)
HGB BLD-MCNC: 15.5 G/DL (ref 13.3–17.7)
IMM GRANULOCYTES # BLD: 0 10E3/UL
IMM GRANULOCYTES NFR BLD: 0 %
LYMPHOCYTES # BLD AUTO: 1.4 10E3/UL (ref 0.8–5.3)
LYMPHOCYTES NFR BLD AUTO: 21 %
MCH RBC QN AUTO: 29.2 PG (ref 26.5–33)
MCHC RBC AUTO-ENTMCNC: 32.2 G/DL (ref 31.5–36.5)
MCV RBC AUTO: 91 FL (ref 78–100)
MONOCYTES # BLD AUTO: 0.6 10E3/UL (ref 0–1.3)
MONOCYTES NFR BLD AUTO: 8 %
NEUTROPHILS # BLD AUTO: 4.5 10E3/UL (ref 1.6–8.3)
NEUTROPHILS NFR BLD AUTO: 68 %
PLATELET # BLD AUTO: 212 10E3/UL (ref 150–450)
RBC # BLD AUTO: 5.31 10E6/UL (ref 4.4–5.9)
WBC # BLD AUTO: 6.5 10E3/UL (ref 4–11)

## 2024-09-30 PROCEDURE — 80061 LIPID PANEL: CPT | Performed by: INTERNAL MEDICINE

## 2024-09-30 PROCEDURE — 36415 COLL VENOUS BLD VENIPUNCTURE: CPT | Performed by: INTERNAL MEDICINE

## 2024-09-30 PROCEDURE — 99397 PER PM REEVAL EST PAT 65+ YR: CPT | Performed by: INTERNAL MEDICINE

## 2024-09-30 PROCEDURE — G0103 PSA SCREENING: HCPCS | Performed by: INTERNAL MEDICINE

## 2024-09-30 PROCEDURE — 80053 COMPREHEN METABOLIC PANEL: CPT | Performed by: INTERNAL MEDICINE

## 2024-09-30 PROCEDURE — 82306 VITAMIN D 25 HYDROXY: CPT | Performed by: INTERNAL MEDICINE

## 2024-09-30 PROCEDURE — 85025 COMPLETE CBC W/AUTO DIFF WBC: CPT | Performed by: INTERNAL MEDICINE

## 2024-09-30 RX ORDER — AMLODIPINE BESYLATE AND ATORVASTATIN CALCIUM 2.5; 4 MG/1; MG/1
1 TABLET, FILM COATED ORAL DAILY
Qty: 90 TABLET | Refills: 4 | Status: SHIPPED | OUTPATIENT
Start: 2024-09-30

## 2024-09-30 RX ORDER — LISINOPRIL 40 MG/1
40 TABLET ORAL DAILY
Qty: 90 TABLET | Refills: 4 | Status: SHIPPED | OUTPATIENT
Start: 2024-09-30

## 2024-09-30 NOTE — PROGRESS NOTES
"Preventive Care Visit  [facility] ANT Joya MD, Internal Medicine - Pediatrics  Sep 30, 2024      Assessment & Plan   Problem List Items Addressed This Visit       HYPERLIPIDEMIA LDL GOAL <130    Relevant Medications    amLODIPine-atorvastatin (CADUET) 2.5-40 MG tablet    Other Relevant Orders    Comprehensive metabolic panel (BMP + Alb, Alk Phos, ALT, AST, Total. Bili, TP)    Lipid panel reflex to direct LDL Fasting    Hypertension with goal blood pressure less than 140/90    Relevant Medications    amLODIPine-atorvastatin (CADUET) 2.5-40 MG tablet    lisinopril (ZESTRIL) 40 MG tablet    Other Relevant Orders    CBC with platelets and differential     Other Visit Diagnoses       Vitamin D deficiency    -  Primary    Relevant Orders    Vitamin D Deficiency                    BMI  Estimated body mass index is 33.19 kg/m  as calculated from the following:    Height as of this encounter: 1.765 m (5' 9.5\").    Weight as of this encounter: 103.4 kg (228 lb).   Weight management plan: Discussed healthy diet and exercise guidelines    Counseling  Appropriate preventive services were addressed with this patient via screening, questionnaire, or discussion as appropriate for fall prevention, nutrition, physical activity, Tobacco-use cessation, social engagement, weight loss and cognition.  Checklist reviewing preventive services available has been given to the patient.  Reviewed patient's diet, addressing concerns and/or questions.   He is at risk for lack of exercise and has been provided with information to increase physical activity for the benefit of his well-being.   He is at risk for psychosocial distress and has been provided with information to reduce risk.   The patient was provided with written information regarding signs of hearing loss.     Work on weight loss  Regular exercise      Orlando Medrano is a 69 year old, presenting for the following:  Wellness Visit        9/30/2024     " 1:20 PM   Additional Questions   Roomed by rodolfo   Accompanied by self        Health Care Directive  Patient does not have a Health Care Directive or Living Will: Discussed advance care planning with patient; information given to patient to review.    HPI   Now retired  Care giving for Mom  5 K walks regularly   Neck stiffening.               9/26/2024   General Health   How would you rate your overall physical health? (!) FAIR   Feel stress (tense, anxious, or unable to sleep) Only a little      (!) STRESS CONCERN      9/26/2024   Nutrition   Diet: Low salt    Low fat/cholesterol       Multiple values from one day are sorted in reverse-chronological order         9/26/2024   Exercise   Days per week of moderate/strenous exercise 3 days   Average minutes spent exercising at this level 40 min            9/26/2024   Social Factors   Frequency of gathering with friends or relatives Twice a week   Worry food won't last until get money to buy more No   Food not last or not have enough money for food? No   Do you have housing? (Housing is defined as stable permanent housing and does not include staying ouside in a car, in a tent, in an abandoned building, in an overnight shelter, or couch-surfing.) Yes   Are you worried about losing your housing? No   Lack of transportation? No   Unable to get utilities (heat,electricity)? No            9/26/2024   Fall Risk   Fallen 2 or more times in the past year? No   Trouble with walking or balance? No             9/26/2024   Activities of Daily Living- Home Safety   Needs help with the following daily activites None of the above   Safety concerns in the home None of the above            9/26/2024   Dental   Dentist two times every year? Yes            9/26/2024   Hearing Screening   Hearing concerns? (!) IT'S HARD TO FOLLOW A CONVERSATION IN A NOISY RESTAURANT OR CROWDED ROOM.            9/26/2024   Driving Risk Screening   Patient/family members have concerns about driving No             9/26/2024   General Alertness/Fatigue Screening   Have you been more tired than usual lately? No            9/26/2024   Urinary Incontinence Screening   Bothered by leaking urine in past 6 months No            9/26/2024   TB Screening   Were you born outside of the [place]? No            Today's PHQ-2 Score:       9/30/2024     1:05 PM   PHQ-2 ( 1999 [employer])   Q1: Little interest or pleasure in doing things 0   Q2: Feeling down, depressed or hopeless 0   PHQ-2 Score 0   Q1: Little interest or pleasure in doing things Not at all   Q2: Feeling down, depressed or hopeless Not at all   PHQ-2 Score 0           9/26/2024   Substance Use   Alcohol more than 3/day or more than 7/wk No   Do you have a current opioid prescription? No   How severe/bad is pain from 1 to 10? 0/10 (No Pain)   Do you use any other substances recreationally? No        Social History     Tobacco Use    Smoking status: Never     Passive exposure: Never    Smokeless tobacco: Never   Vaping Use    Vaping status: Never Used   Substance Use Topics    Alcohol use: Yes     Comment: beer once in a while    Drug use: No           9/26/2024   AAA Screening   Family history of Abdominal Aortic Aneurysm (AAA)? No      Last PSA:   PSA   Date Value Ref Range Status   04/04/2019 1.04 0 - 4 ug/L Final     Comment:     Assay Method:  Chemiluminescence using Siemens Vista analyzer     Prostate Specific Antigen Screen   Date Value Ref Range Status   09/19/2022 1.48 0.00 - 4.00 ug/L Final     ASCVD Risk   The 10-year ASCVD risk score (Braeden DUPREE, et al., 2019) is: 21.7%    Values used to calculate the score:      Age: 69 years      Sex: Male      Is Non- : No      Diabetic: No      Tobacco smoker: No      Systolic Blood Pressure: 145 mmHg      Is BP treated: Yes      HDL Cholesterol: 42 mg/dL      Total Cholesterol: 185 mg/dL            Reviewed and updated as needed this visit by Provider                    Lab work is in  process  Labs reviewed in EPIC  BP Readings from Last 3 Encounters:   09/30/24 136/78   04/08/24 (!) 144/84   10/27/23 (!) 148/87    Wt Readings from Last 3 Encounters:   09/30/24 103.4 kg (228 lb)   10/27/23 104.8 kg (231 lb)   06/19/23 104.8 kg (231 lb)                  Patient Active Problem List   Diagnosis    Slow urinary stream    HYPERLIPIDEMIA LDL GOAL <130    Tinea corporis    Hypertension with goal blood pressure less than 140/90    Family history of prostate cancer in father     Past Surgical History:   Procedure Laterality Date    ORTHOPEDIC SURGERY  Mar 2019    TONSILLECTOMY  age 6 or 7       Social History     Tobacco Use    Smoking status: Never     Passive exposure: Never    Smokeless tobacco: Never   Substance Use Topics    Alcohol use: Yes     Comment: beer once in a while     Family History   Problem Relation Age of Onset    Cancer Maternal Grandfather         bone         Current Outpatient Medications   Medication Sig Dispense Refill    amLODIPine-atorvastatin (CADUET) 2.5-40 MG tablet Take 1 tablet by mouth daily. 90 tablet 4    lisinopril (ZESTRIL) 40 MG tablet Take 1 tablet (40 mg) by mouth daily. 90 tablet 4    fluconazole (DIFLUCAN) 200 MG tablet Take 1 tablet (200 mg) by mouth daily 2 tablets as needed, repeat 2 tablets in 2 weeks 4 tablet 6     No Known Allergies  Current providers sharing in care for this patient include:  Patient Care Team:  Jey Joya MD as PCP - General (Internal Medicine - Pediatrics)  Radha Branch AuD as Audiologist (Audiology)  Susana Cueva AuD as Audiologist (Audiology)  Jey Joya MD as Referring Physician (Internal Medicine)  Jey Joya MD as Assigned PCP  Susana Castelan AuD as Audiologist (Audiology)  Veronika Mcgill MD as Assigned Musculoskeletal Provider  Renee Delacruz AuD as Audiologist (Audiology)    The following health maintenance items are reviewed in Epic and correct as of today:  Health Maintenance  "  Topic Date Due    ZOSTER IMMUNIZATION (1 of 2) Never done    Pneumococcal Vaccine: 65+ Years (1 of 1 - PCV) Never done    INFLUENZA VACCINE (1) 09/01/2024    COVID-19 Vaccine (5 - 2024-25 season) 09/01/2024    MEDICARE ANNUAL WELLNESS VISIT  10/27/2024    BMP  10/27/2024    LIPID  10/27/2024    FALL RISK ASSESSMENT  09/30/2025    GLUCOSE  10/27/2026    COLORECTAL CANCER SCREENING  05/04/2028    ADVANCE CARE PLANNING  10/27/2028    DTAP/TDAP/TD IMMUNIZATION (3 - Td or Tdap) 09/04/2030    RSV VACCINE (1 - 1-dose 75+ series) 09/12/2032    HEPATITIS C SCREENING  Completed    PHQ-2 (once per calendar year)  Completed    HPV IMMUNIZATION  Aged Out    MENINGITIS IMMUNIZATION  Aged Out    RSV MONOCLONAL ANTIBODY  Aged Out         Review of Systems  Constitutional, HEENT, cardiovascular, pulmonary, gi and gu systems are negative, except as otherwise noted.     Objective    Exam  BP (!) 145/72   Pulse 55   Temp 97.1  F (36.2  C)   Resp 12   Ht 1.765 m (5' 9.5\")   Wt 103.4 kg (228 lb)   SpO2 98%   BMI 33.19 kg/m     Estimated body mass index is 33.19 kg/m  as calculated from the following:    Height as of this encounter: 1.765 m (5' 9.5\").    Weight as of this encounter: 103.4 kg (228 lb).    Physical Exam  GENERAL: alert and no distress  EYES: Eyes grossly normal to inspection, PERRL and conjunctivae and sclerae normal  HENT: ear canals and TM's normal, nose and mouth without ulcers or lesions  NECK: no adenopathy, no asymmetry, masses, or scars  RESP: lungs clear to auscultation - no rales, rhonchi or wheezes  CV: regular rate and rhythm, normal S1 S2, no S3 or S4, no murmur, click or rub, no peripheral edema  ABDOMEN: soft, nontender, no hepatosplenomegaly, no masses and bowel sounds normal  MS: no gross musculoskeletal defects noted, no edema        9/30/2024   Mini Cog   Clock Draw Score 2 Normal   3 Item Recall 3 objects recalled   Mini Cog Total Score 5            Vision Screen  Reason Vision Screen Not " Completed: Patient had exam in last 12 months      Signed Electronically by: Jey Joya MD

## 2024-10-01 LAB
ALBUMIN SERPL BCG-MCNC: 4.8 G/DL (ref 3.5–5.2)
ALP SERPL-CCNC: 102 U/L (ref 40–150)
ALT SERPL W P-5'-P-CCNC: 33 U/L (ref 0–70)
ANION GAP SERPL CALCULATED.3IONS-SCNC: 11 MMOL/L (ref 7–15)
AST SERPL W P-5'-P-CCNC: 23 U/L (ref 0–45)
BILIRUB SERPL-MCNC: 0.7 MG/DL
BUN SERPL-MCNC: 17.1 MG/DL (ref 8–23)
CALCIUM SERPL-MCNC: 9.3 MG/DL (ref 8.8–10.4)
CHLORIDE SERPL-SCNC: 104 MMOL/L (ref 98–107)
CHOLEST SERPL-MCNC: 156 MG/DL
CREAT SERPL-MCNC: 0.99 MG/DL (ref 0.67–1.17)
EGFRCR SERPLBLD CKD-EPI 2021: 83 ML/MIN/1.73M2
FASTING STATUS PATIENT QL REPORTED: YES
FASTING STATUS PATIENT QL REPORTED: YES
GLUCOSE SERPL-MCNC: 94 MG/DL (ref 70–99)
HCO3 SERPL-SCNC: 26 MMOL/L (ref 22–29)
HDLC SERPL-MCNC: 43 MG/DL
LDLC SERPL CALC-MCNC: 87 MG/DL
NONHDLC SERPL-MCNC: 113 MG/DL
POTASSIUM SERPL-SCNC: 4.5 MMOL/L (ref 3.4–5.3)
PROT SERPL-MCNC: 6.7 G/DL (ref 6.4–8.3)
PSA SERPL DL<=0.01 NG/ML-MCNC: 1.56 NG/ML (ref 0–4.5)
SODIUM SERPL-SCNC: 141 MMOL/L (ref 135–145)
TRIGL SERPL-MCNC: 129 MG/DL
VIT D+METAB SERPL-MCNC: 34 NG/ML (ref 20–50)

## 2024-10-31 ENCOUNTER — DOCUMENTATION ONLY (OUTPATIENT)
Dept: AUDIOLOGY | Facility: CLINIC | Age: 69
End: 2024-10-31
Payer: COMMERCIAL

## 2024-10-31 NOTE — PROGRESS NOTES
The patient [facility] off both of [facility] hearing aids [facility] for an end of warranty repair, per previous discussion with his audiologist. The hearing aids were sent to the  today and the patient will be contacted via [employer] when the repaired devices are back and available to .

## 2025-01-13 ENCOUNTER — MYC MEDICAL ADVICE (OUTPATIENT)
Dept: FAMILY MEDICINE | Facility: CLINIC | Age: 70
End: 2025-01-13
Payer: COMMERCIAL

## 2025-01-13 DIAGNOSIS — E78.5 HYPERLIPIDEMIA LDL GOAL <130: ICD-10-CM

## 2025-01-13 DIAGNOSIS — I10 HYPERTENSION WITH GOAL BLOOD PRESSURE LESS THAN 140/90: Primary | ICD-10-CM

## 2025-01-13 RX ORDER — AMLODIPINE BESYLATE 2.5 MG/1
2.5 TABLET ORAL DAILY
Qty: 90 TABLET | Refills: 3 | Status: SHIPPED | OUTPATIENT
Start: 2025-01-13

## 2025-01-13 RX ORDER — ATORVASTATIN CALCIUM 40 MG/1
40 TABLET, FILM COATED ORAL DAILY
Qty: 90 TABLET | Refills: 3 | Status: SHIPPED | OUTPATIENT
Start: 2025-01-13

## 2025-04-26 ENCOUNTER — MYC REFILL (OUTPATIENT)
Dept: FAMILY MEDICINE | Facility: CLINIC | Age: 70
End: 2025-04-26
Payer: COMMERCIAL

## 2025-04-26 DIAGNOSIS — E78.5 HYPERLIPIDEMIA LDL GOAL <130: ICD-10-CM

## 2025-04-26 DIAGNOSIS — I10 HYPERTENSION WITH GOAL BLOOD PRESSURE LESS THAN 140/90: ICD-10-CM

## 2025-04-26 DIAGNOSIS — B35.4 TINEA CORPORIS: ICD-10-CM

## 2025-04-28 ENCOUNTER — TELEPHONE (OUTPATIENT)
Dept: FAMILY MEDICINE | Facility: CLINIC | Age: 70
End: 2025-04-28
Payer: COMMERCIAL

## 2025-04-28 RX ORDER — FLUCONAZOLE 200 MG/1
200 TABLET ORAL DAILY
Qty: 4 TABLET | Refills: 6 | Status: SHIPPED | OUTPATIENT
Start: 2025-04-28

## 2025-04-28 RX ORDER — AMLODIPINE BESYLATE 2.5 MG/1
2.5 TABLET ORAL DAILY
Qty: 90 TABLET | Refills: 3 | Status: SHIPPED | OUTPATIENT
Start: 2025-04-28

## 2025-04-28 RX ORDER — LISINOPRIL 40 MG/1
40 TABLET ORAL DAILY
Qty: 90 TABLET | Refills: 4 | Status: SHIPPED | OUTPATIENT
Start: 2025-04-28

## 2025-04-28 RX ORDER — ATORVASTATIN CALCIUM 40 MG/1
40 TABLET, FILM COATED ORAL DAILY
Qty: 90 TABLET | Refills: 3 | Status: SHIPPED | OUTPATIENT
Start: 2025-04-28

## 2025-04-28 NOTE — TELEPHONE ENCOUNTER
Can you confirm the directions for fluconazole 200mg? It has 2 sets,1 daily or 2 as needed may repeat in 2 weeks   Thank you  Suad Arthur, [facility]  [address] [place] [address]  NAY[place] Ruiz [address] [phone]

## 2025-04-29 NOTE — TELEPHONE ENCOUNTER
The dose has been 400 mg once and repeat in 2 weeks if needed.     However if the 200 mg dose has been working, that it better for long term side effects .    Call and confirm that the 200 mg has been working

## 2025-04-29 NOTE — TELEPHONE ENCOUNTER
Spoke with patient, he reports he has been taking 400 mg (two tabs). Notified of provider's message and verbalized understanding.    KAMRAN GuzmanN RN  [facility]

## 2025-04-29 NOTE — TELEPHONE ENCOUNTER
I did just speak to the patient and he thought the dose was 200mg (1tablet) then repeat in 2 weeks.  Thank you  Suad Arthur, [facility]  [address] [place] [address]  [place] [address] [phone]

## 2025-06-24 ENCOUNTER — OFFICE VISIT (OUTPATIENT)
Dept: ORTHOPEDICS | Facility: CLINIC | Age: 70
End: 2025-06-24
Payer: COMMERCIAL

## 2025-06-24 ENCOUNTER — ANCILLARY PROCEDURE (OUTPATIENT)
Dept: GENERAL RADIOLOGY | Facility: CLINIC | Age: 70
End: 2025-06-24
Attending: ORTHOPAEDIC SURGERY
Payer: COMMERCIAL

## 2025-06-24 VITALS — OXYGEN SATURATION: 98 % | SYSTOLIC BLOOD PRESSURE: 147 MMHG | DIASTOLIC BLOOD PRESSURE: 56 MMHG | HEART RATE: 86 BPM

## 2025-06-24 DIAGNOSIS — S01.112A LACERATION OF LEFT EYEBROW, INITIAL ENCOUNTER: ICD-10-CM

## 2025-06-24 DIAGNOSIS — S60.222A CONTUSION OF LEFT HAND, INITIAL ENCOUNTER: ICD-10-CM

## 2025-06-24 DIAGNOSIS — S69.92XA HAND INJURY, LEFT, INITIAL ENCOUNTER: Primary | ICD-10-CM

## 2025-06-24 DIAGNOSIS — S69.92XA HAND INJURY, LEFT, INITIAL ENCOUNTER: ICD-10-CM

## 2025-06-24 PROCEDURE — 73130 X-RAY EXAM OF HAND: CPT | Mod: TC | Performed by: RADIOLOGY

## 2025-06-24 PROCEDURE — 99203 OFFICE O/P NEW LOW 30 MIN: CPT | Performed by: ORTHOPAEDIC SURGERY

## 2025-06-24 PROCEDURE — 3078F DIAST BP <80 MM HG: CPT | Performed by: ORTHOPAEDIC SURGERY

## 2025-06-24 PROCEDURE — 3077F SYST BP >= 140 MM HG: CPT | Performed by: ORTHOPAEDIC SURGERY

## 2025-06-24 NOTE — PROGRESS NOTES
SUBJECTIVE:   Mitchell Randolph is a 69 year old male who is seen as self referral for evaluation of left hand injury that occurred a few minutes ago. Mechanism: walking and tripped falling on hand.  Hit his head, but thinks his glasses were pushed up and cut him just above the left eye. No apparent access to Urgent Care so he made an appointment with orthopedics.    Present symptoms: pain ulnar hand, swelling  Some bleeding over the left eye, swelling. Scraped left knee.     Treatments tried to this point: dressing on eye cut.      Review of Systems:  Constitutional:  NEGATIVE for fever, chills, change in weight  Integumentary/Skin:  NEGATIVE for worrisome rashes, moles or lesions  Eyes:  NEGATIVE for vision changes or irritation  ENT/Mouth:  NEGATIVE for ear, mouth and throat problems  Resp:  NEGATIVE for significant cough or SOB  Breast:  NEGATIVE for masses, tenderness or discharge  CV:  NEGATIVE for chest pain, palpitations or peripheral edema  GI:  NEGATIVE for nausea, abdominal pain, heartburn, or change in bowel habits  :  Negative   Musculoskeletal:  See HPI above  Neuro:  NEGATIVE for weakness, dizziness or paresthesias  Endocrine:  NEGATIVE for temperature intolerance, skin/hair changes  Heme/allergy/immune:  NEGATIVE for bleeding problems  Psychiatric:  NEGATIVE for changes in mood or affect    Past Medical History:   Past Medical History:   Diagnosis Date    High cholesterol     Hypertension      Past Surgical History:   Past Surgical History:   Procedure Laterality Date    ORTHOPEDIC SURGERY  Mar 2019    TONSILLECTOMY  age 6 or 7     Family History:   Family History   Problem Relation Age of Onset    Cancer Maternal Grandfather         bone     Social History:   Social History     Tobacco Use    Smoking status: Never     Passive exposure: Never    Smokeless tobacco: Never   Substance Use Topics    Alcohol use: Yes     Comment: beer once in a while     OBJECTIVE:  Physical Exam:  BP (!) 147/56 (BP Location:  Right arm, Patient Position: Sitting, Cuff Size: Adult Regular)   Pulse 86   SpO2 98%   General Appearance: healthy, alert and no distress   Skin: no suspicious lesions or rashes  Neuro: Normal strength and tone, mentation intact and speech normal. No changes  Vascular: good pulses, and cappillary refill   Lymph: no lymphadenopathy   Psych:  mentation appears normal and affect normal/bright  Resp: no increased work of breathing   HEENT: local swelling above left eye 1.5 cm laceration which appears to be approximated.        Left Hand Exam:  Inspection: no malrotation of the digits  ROM: able to make a full fist  Tender: mid and base of 5th metacarpal   Some pain with full wrist dorsiflexion          X-rays:  Obtained today of the left hand: 3-views, reviewed in the office with the patient by myself today and show no fractures. No abnormalities in the area in question. (Ulnar hand)     ASSESSMENT:   Left hand contusion  Minor left eye laceration    PLAN:   Buddy tape 4&5 fingers as needed   Ice to hand and head  Steri strip placed on eye laceration    Return to clinic: as needed     GLORIA Toledo MD  Dept. Orthopedic Surgery  [facility]

## 2025-06-24 NOTE — LETTER
6/24/2025      Mitchell Randolph  [address] [place] [id number]      Dear Colleague,    Thank you for referring your patient, Mitchell Randolph, to the [facility]. Please see a copy of my visit note below.    SUBJECTIVE:   Mitchell Randolph is a 69 year old male who is seen as self referral for evaluation of left hand injury that occurred a few minutes ago. Mechanism: walking and tripped falling on hand.  Hit his head, but thinks his glasses were pushed up and cut him just above the left eye. No apparent access to Urgent Care so he made an appointment with orthopedics.    Present symptoms: pain ulnar hand, swelling  Some bleeding over the left eye, swelling. Scraped left knee.     Treatments tried to this point: dressing on eye cut.      Review of Systems:  Constitutional:  NEGATIVE for fever, chills, change in weight  Integumentary/Skin:  NEGATIVE for worrisome rashes, moles or lesions  Eyes:  NEGATIVE for vision changes or irritation  ENT/Mouth:  NEGATIVE for ear, mouth and throat problems  Resp:  NEGATIVE for significant cough or SOB  Breast:  NEGATIVE for masses, tenderness or discharge  CV:  NEGATIVE for chest pain, palpitations or peripheral edema  GI:  NEGATIVE for nausea, abdominal pain, heartburn, or change in bowel habits  :  Negative   Musculoskeletal:  See HPI above  Neuro:  NEGATIVE for weakness, dizziness or paresthesias  Endocrine:  NEGATIVE for temperature intolerance, skin/hair changes  Heme/allergy/immune:  NEGATIVE for bleeding problems  Psychiatric:  NEGATIVE for changes in mood or affect    Past Medical History:   Past Medical History:   Diagnosis Date     High cholesterol      Hypertension      Past Surgical History:   Past Surgical History:   Procedure Laterality Date     ORTHOPEDIC SURGERY  Mar 2019     TONSILLECTOMY  age 6 or 7     Family History:   Family History   Problem Relation Age of Onset     Cancer Maternal Grandfather         bone     Social History:   Social  History     Tobacco Use     Smoking status: Never     Passive exposure: Never     Smokeless tobacco: Never   Substance Use Topics     Alcohol use: Yes     Comment: beer once in a while     OBJECTIVE:  Physical Exam:  BP (!) 147/56 (BP Location: Right arm, Patient Position: Sitting, Cuff Size: Adult Regular)   Pulse 86   SpO2 98%   General Appearance: healthy, alert and no distress   Skin: no suspicious lesions or rashes  Neuro: Normal strength and tone, mentation intact and speech normal. No changes  Vascular: good pulses, and cappillary refill   Lymph: no lymphadenopathy   Psych:  mentation appears normal and affect normal/bright  Resp: no increased work of breathing   HEENT: local swelling above left eye 1.5 cm laceration which appears to be approximated.        Left Hand Exam:  Inspection: no malrotation of the digits  ROM: able to make a full fist  Tender: mid and base of 5th metacarpal   Some pain with full wrist dorsiflexion          X-rays:  Obtained today of the left hand: 3-views, reviewed in the office with the patient by myself today and show no fractures. No abnormalities in the area in question. (Ulnar hand)     ASSESSMENT:   Left hand contusion  Minor left eye laceration    PLAN:   Buddy tape 4&5 fingers as needed   Ice to hand and head  Steri strip placed on eye laceration    Return to clinic: as needed     GLORIA Toledo MD  Dept. Orthopedic Surgery  [facility]       Again, thank you for allowing me to participate in the care of your patient.        Sincerely,        Ambrosio Toledo MD    Electronically signed

## 2025-07-21 ENCOUNTER — MYC REFILL (OUTPATIENT)
Dept: FAMILY MEDICINE | Facility: CLINIC | Age: 70
End: 2025-07-21
Payer: COMMERCIAL

## 2025-07-21 DIAGNOSIS — E78.5 HYPERLIPIDEMIA LDL GOAL <130: ICD-10-CM

## 2025-07-21 DIAGNOSIS — I10 HYPERTENSION WITH GOAL BLOOD PRESSURE LESS THAN 140/90: ICD-10-CM

## 2025-07-21 RX ORDER — ATORVASTATIN CALCIUM 40 MG/1
40 TABLET, FILM COATED ORAL DAILY
Qty: 90 TABLET | Refills: 0 | Status: SHIPPED | OUTPATIENT
Start: 2025-07-21

## 2025-07-21 RX ORDER — LISINOPRIL 40 MG/1
40 TABLET ORAL DAILY
Qty: 90 TABLET | Refills: 0 | Status: SHIPPED | OUTPATIENT
Start: 2025-07-21

## 2025-07-21 RX ORDER — AMLODIPINE BESYLATE 2.5 MG/1
2.5 TABLET ORAL DAILY
Qty: 90 TABLET | Refills: 0 | Status: SHIPPED | OUTPATIENT
Start: 2025-07-21

## 2025-08-29 ENCOUNTER — DOCUMENTATION ONLY (OUTPATIENT)
Dept: AUDIOLOGY | Facility: CLINIC | Age: 70
End: 2025-08-29
Payer: COMMERCIAL

## 2025-08-29 DIAGNOSIS — H90.3 SENSORINEURAL HEARING LOSS, ASYMMETRICAL: Primary | ICD-10-CM

## 2025-09-01 ENCOUNTER — PATIENT OUTREACH (OUTPATIENT)
Dept: CARE COORDINATION | Facility: CLINIC | Age: 70
End: 2025-09-01
Payer: COMMERCIAL